# Patient Record
Sex: FEMALE | Race: WHITE | NOT HISPANIC OR LATINO | Employment: OTHER | URBAN - METROPOLITAN AREA
[De-identification: names, ages, dates, MRNs, and addresses within clinical notes are randomized per-mention and may not be internally consistent; named-entity substitution may affect disease eponyms.]

---

## 2018-06-15 ENCOUNTER — APPOINTMENT (OUTPATIENT)
Dept: WOMENS IMAGING | Facility: HOSPITAL | Age: 73
End: 2018-06-15

## 2018-06-15 PROCEDURE — 77067 SCR MAMMO BI INCL CAD: CPT | Performed by: RADIOLOGY

## 2018-08-23 ENCOUNTER — OFFICE VISIT (OUTPATIENT)
Dept: CARDIAC SURGERY | Facility: CLINIC | Age: 73
End: 2018-08-23

## 2018-08-23 VITALS
SYSTOLIC BLOOD PRESSURE: 120 MMHG | HEART RATE: 71 BPM | HEIGHT: 61 IN | OXYGEN SATURATION: 95 % | DIASTOLIC BLOOD PRESSURE: 64 MMHG | BODY MASS INDEX: 30.47 KG/M2 | WEIGHT: 161.4 LBS

## 2018-08-23 DIAGNOSIS — I73.9 PERIPHERAL ARTERIAL DISEASE (HCC): Primary | ICD-10-CM

## 2018-08-23 DIAGNOSIS — I73.9 PERIPHERAL VASCULAR DISEASE (HCC): Primary | ICD-10-CM

## 2018-08-23 PROCEDURE — 99204 OFFICE O/P NEW MOD 45 MIN: CPT | Performed by: THORACIC SURGERY (CARDIOTHORACIC VASCULAR SURGERY)

## 2018-08-23 RX ORDER — ASCORBIC ACID 500 MG
500 TABLET ORAL DAILY
COMMUNITY
End: 2022-04-07

## 2018-08-23 RX ORDER — IRBESARTAN 300 MG/1
300 TABLET ORAL NIGHTLY
COMMUNITY
End: 2021-01-29

## 2018-08-23 RX ORDER — CLOPIDOGREL BISULFATE 75 MG/1
75 TABLET ORAL DAILY
COMMUNITY
End: 2021-07-07 | Stop reason: SDUPTHER

## 2018-08-23 RX ORDER — ALBUTEROL SULFATE 2.5 MG/3ML
2.5 SOLUTION RESPIRATORY (INHALATION) EVERY 4 HOURS PRN
COMMUNITY

## 2018-08-23 RX ORDER — GABAPENTIN 300 MG/1
300 CAPSULE ORAL 3 TIMES DAILY
COMMUNITY
End: 2022-04-07 | Stop reason: SDUPTHER

## 2018-08-23 RX ORDER — LEVOTHYROXINE SODIUM 0.03 MG/1
12.5 TABLET ORAL 2 TIMES DAILY
COMMUNITY
End: 2018-09-03

## 2018-08-23 RX ORDER — CARVEDILOL 25 MG/1
25 TABLET ORAL 2 TIMES DAILY WITH MEALS
COMMUNITY
End: 2021-04-26 | Stop reason: SDUPTHER

## 2018-08-23 RX ORDER — ASPIRIN 81 MG/1
81 TABLET, CHEWABLE ORAL DAILY
COMMUNITY
End: 2021-01-29

## 2018-08-23 RX ORDER — CHOLECALCIFEROL (VITAMIN D3) 125 MCG
500 CAPSULE ORAL DAILY
COMMUNITY
End: 2021-01-29

## 2018-08-23 RX ORDER — SPIRONOLACTONE 100 MG/1
50 TABLET, FILM COATED ORAL NIGHTLY
COMMUNITY
Start: 2018-06-14 | End: 2021-01-29

## 2018-08-23 RX ORDER — ATORVASTATIN CALCIUM 80 MG/1
80 TABLET, FILM COATED ORAL DAILY
COMMUNITY
End: 2021-01-11

## 2018-08-23 RX ORDER — CHLORTHALIDONE 25 MG/1
25 TABLET ORAL DAILY
COMMUNITY
End: 2022-06-01 | Stop reason: SDUPTHER

## 2018-08-23 RX ORDER — FUROSEMIDE 20 MG/1
20 TABLET ORAL EVERY OTHER DAY
COMMUNITY
End: 2021-07-07 | Stop reason: SDUPTHER

## 2018-08-23 RX ORDER — ACETAMINOPHEN 500 MG
1000 TABLET ORAL EVERY 6 HOURS PRN
COMMUNITY
End: 2022-04-07

## 2018-08-23 RX ORDER — AMLODIPINE BESYLATE 10 MG/1
10 TABLET ORAL DAILY
COMMUNITY
Start: 2018-06-19 | End: 2021-04-26 | Stop reason: SDUPTHER

## 2018-08-23 RX ORDER — PANTOPRAZOLE SODIUM 40 MG/1
40 TABLET, DELAYED RELEASE ORAL 2 TIMES DAILY
COMMUNITY
End: 2022-03-21 | Stop reason: SDUPTHER

## 2018-08-23 NOTE — PROGRESS NOTES
08/23/2018  Patient Information  Vivi Son                                                                                          [address] [place] [address]   1945  'PCP/Referring Physician'  Provider, No Known  None  No ref. provider found    Chief Complaint   Patient presents with   • Consult     np for PAD per Dr. Vaughn        History of Present Illness:  This is a 73-year-old patient with a multitude of medical problems.  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  She tells me that she has maintained cancer-free at this time.  She has had peripheral arterial stents before, and a recent CT angiogram demonstrates critical left iliac stenosis with moderate right and left SFA stenosis.  It is estimated that the left iliac stenosis is greater than 90% and the distal aorta also has some degree of narrowing.  She has claudication in the left thigh, buttocks and calf in less than 50 feet.  She has some claudication in the right leg but the left leg is her most significant and it stops her ambulation first.  She does not have evidence of rest pain at this time.  Unfortunately, she is still continuing to smoke.      Patient Active Problem List   Diagnosis   • Peripheral vascular disease (CMS/HCC)   • Peripheral arterial disease (CMS/HCC)     Past Medical History:   Diagnosis Date   • Anxiety    • Arthritis    • Blood transfusion abn reaction or complication, no procedure mishap    • CAD (coronary artery disease)    • CHF (congestive heart failure) (CMS/HCC)    • Chronic kidney disease    • COPD (chronic obstructive pulmonary disease) (CMS/HCC)    • Depression    • Diabetes mellitus type II, controlled (CMS/HCC)    • GERD (gastroesophageal reflux disease)    • HTN (hypertension)    • Hyperlipidemia    • MI (myocardial infarction)    • Non-small cell carcinoma of right lung (CMS/HCC)    • PUD (peptic ulcer disease)    • PVD (peripheral vascular  disease) (CMS/HCC)    • Thyroid disease    • Tremors of nervous system      Past Surgical History:   Procedure Laterality Date   • APPENDECTOMY  1978   • CARPAL TUNNEL RELEASE Right    • CORONARY ANGIOPLASTY WITH STENT PLACEMENT      [facility] - unknown physician name    • LUMBAR SPINE SURGERY     • LUNG LOBECTOMY Right     RUL    • SACROCOCCYGEAL ULCER REMOVAL     • VOCAL CORD BIOPSY         Current Outpatient Prescriptions:   •  acetaminophen (TYLENOL) 500 MG tablet, Take 500 mg by mouth Every 6 (Six) Hours As Needed for Mild Pain ., Disp: , Rfl:   •  albuterol (PROVENTIL) (2.5 MG/3ML) 0.083% nebulizer solution, Take 2.5 mg by nebulization Every 4 (Four) Hours As Needed for Wheezing., Disp: , Rfl:   •  amLODIPine (NORVASC) 5 MG tablet, , Disp: , Rfl:   •  aspirin 81 MG chewable tablet, Chew 81 mg Daily., Disp: , Rfl:   •  atorvastatin (LIPITOR) 80 MG tablet, Take 80 mg by mouth Daily., Disp: , Rfl:   •  carbonyl iron (FEOSOL) 45 MG tablet tablet, Take  by mouth Daily., Disp: , Rfl:   •  carvedilol (COREG) 25 MG tablet, Take 25 mg by mouth 2 (Two) Times a Day With Meals., Disp: , Rfl:   •  chlorthalidone (HYGROTON) 25 MG tablet, Take 25 mg by mouth Daily., Disp: , Rfl:   •  Cholecalciferol (VITAMIN D3) 5000 units capsule capsule, Take 5,000 Units by mouth Daily., Disp: , Rfl:   •  clopidogrel (PLAVIX) 75 MG tablet, Take 75 mg by mouth Daily., Disp: , Rfl:   •  furosemide (LASIX) 20 MG tablet, Take 20 mg by mouth 2 (Two) Times a Day., Disp: , Rfl:   •  gabapentin (NEURONTIN) 300 MG capsule, Take 300 mg by mouth 3 (Three) Times a Day., Disp: , Rfl:   •  Insulin Degludec (TRESIBA FLEXTOUCH SC), Inject  under the skin into the appropriate area as directed., Disp: , Rfl:   •  insulin NPH-insulin regular (humuLIN 70/30,novoLIN 70/30) (70-30) 100 UNIT/ML injection, Inject  under the skin into the appropriate area as directed 2 (Two) Times a Day With Meals., Disp: , Rfl:   •  irbesartan (AVAPRO) 300 MG tablet,  Take 300 mg by mouth Every Night., Disp: , Rfl:   •  levothyroxine (SYNTHROID, LEVOTHROID) 12.5 MCG half tablet, Take 12.5 mcg by mouth 2 (Two) Times a Day., Disp: , Rfl:   •  pantoprazole (PROTONIX) 40 MG EC tablet, Take 40 mg by mouth Daily., Disp: , Rfl:   •  sodium-potassium bicarbonate (SHELLEY-SELTZER GOLD) effervescent tablet, Take 2 tablets by mouth Every 4 (Four) Hours As Needed., Disp: , Rfl:   •  spironolactone (ALDACTONE) 100 MG tablet, , Disp: , Rfl:   •  SUPER B COMPLEX/C capsule, Take  by mouth., Disp: , Rfl:   •  tiotropium (SPIRIVA) 18 MCG per inhalation capsule, Place 1 capsule into inhaler and inhale Daily., Disp: , Rfl:   •  vitamin B-12 (CYANOCOBALAMIN) 500 MCG tablet, Take 500 mcg by mouth Daily., Disp: , Rfl:   •  vitamin C (ASCORBIC ACID) 500 MG tablet, Take 500 mg by mouth Daily., Disp: , Rfl:   Allergies   Allergen Reactions   • Lortab [Hydrocodone-Acetaminophen] Hallucinations   • Percocet [Oxycodone-Acetaminophen] Hallucinations     Social History     Social History   • Marital status:      Spouse name: N/A   • Number of children: 2   • Years of education: N/A     Occupational History   • Retired       [employer]      Social History Main Topics   • Smoking status: Current Every Day Smoker     Packs/day: 1.50     Years: 60.00     Types: Cigarettes   • Smokeless tobacco: Never Used   • Alcohol use No   • Drug use: No   • Sexual activity: Not on file     Other Topics Concern   • Not on file     Social History Narrative    Lives alone in [place].     Family History   Problem Relation Age of Onset   • Diabetes insipidus Mother    • Diabetes Mother    • Kidney failure Mother    • Dementia Father      Review of Systems   Constitution: Positive for malaise/fatigue. Negative for chills, fever, night sweats and weight loss.   HENT: Positive for hearing loss. Negative for odynophagia and sore throat.    Cardiovascular: Positive for dyspnea on exertion and leg swelling (bi lat ). Negative for  "chest pain, orthopnea and palpitations.   Respiratory: Positive for cough. Negative for hemoptysis.    Endocrine: Positive for cold intolerance and heat intolerance. Negative for polydipsia, polyphagia and polyuria.   Hematologic/Lymphatic: Bruises/bleeds easily.   Skin: Negative for itching and rash.   Musculoskeletal: Positive for back pain, muscle cramps (left leg ) and neck pain. Negative for joint pain, joint swelling and myalgias.   Gastrointestinal: Positive for constipation, diarrhea and melena (iron med). Negative for abdominal pain, hematemesis, hematochezia, nausea and vomiting.   Genitourinary: Positive for nocturia and urgency. Negative for dysuria, frequency and hematuria.   Neurological: Positive for dizziness, loss of balance and sensory change (tingling ). Negative for focal weakness, headaches, light-headedness, numbness and seizures.   Psychiatric/Behavioral: Positive for depression. Negative for suicidal ideas. The patient is nervous/anxious.    All other systems reviewed and are negative.    Vitals:    08/23/18 0903   BP: 120/64   BP Location: Right arm   Patient Position: Sitting   Pulse: 71   SpO2: 95%   Weight: 73.2 kg (161 lb 6.4 oz)   Height: 154.9 cm (61\")      Physical Exam  CONSTITUTIONAL:  Alert and conversant, in no acute distress.  EYES:    Eyes anicteric.  EOMI.  PERRLA.  ENT:    No nasal deviation.  Trachea is midline.  NECK:    No masses or JVD.  LUNGS:   Decreased in the bases; no wheezing, rales or rhonchi.  CARDIAC:        Regular rate and rhythm without murmur or rub.  No carotid bruits.  GI:      The abdomen is soft, nontender, nondistended with normal bowel sounds.  No hepatosplenomegaly and no masses.  EXTREMITIES:    No cyanosis, clubbing or edema.  SKIN:    No ulcers.  NEURO:   No focal motor or sensory deficits.  PSYCH:   Alert and oriented x3; mood and affect good.  VASCULAR:   The femoral pulses are weak bilaterally.  The feet are hyperemic.  The toenails are thickened. "  The feet are viable.  I am unable to palpate       posterior tibial or dorsalis pedis pulses in either leg.  Labs/Imaging:  I have discussed this case with her cardiologist, Dr. Vaughn, and I have reviewed the CT angiogram demonstrating greater than 90% left iliac stenosis.    Assessment/Plan:  Significant peripheral vascular disease.  The patient is now in remission following thoracotomy last year for non-small cell lung cancer.  She has significant left leg claudication and greater than 90% left iliac stenosis.  We will plan aortogram with runoff for evaluation.  We will probably proceed with a left femoral stick.  She is aware of nephrotoxicity, contrast reaction and limb loss.  No guarantees have been made as to outcome.  She agrees to proceed.    Patient Active Problem List   Diagnosis   • Peripheral vascular disease (CMS/HCC)   • Peripheral arterial disease (CMS/HCC)     CC: MD Ena Parks transcribing on behalf of Triston Floyd MD

## 2018-08-24 ENCOUNTER — PREP FOR SURGERY (OUTPATIENT)
Dept: OTHER | Facility: HOSPITAL | Age: 73
End: 2018-08-24

## 2018-08-24 DIAGNOSIS — I73.9 PAD (PERIPHERAL ARTERY DISEASE) (HCC): Primary | ICD-10-CM

## 2018-09-03 ENCOUNTER — ANESTHESIA EVENT (OUTPATIENT)
Dept: PERIOP | Facility: HOSPITAL | Age: 73
End: 2018-09-03

## 2018-09-03 ENCOUNTER — APPOINTMENT (OUTPATIENT)
Dept: PREADMISSION TESTING | Facility: HOSPITAL | Age: 73
End: 2018-09-03

## 2018-09-03 VITALS — WEIGHT: 163.36 LBS | HEIGHT: 61 IN | BODY MASS INDEX: 30.84 KG/M2

## 2018-09-03 DIAGNOSIS — I73.9 PAD (PERIPHERAL ARTERY DISEASE) (HCC): ICD-10-CM

## 2018-09-03 LAB
ANION GAP SERPL CALCULATED.3IONS-SCNC: 4 MMOL/L (ref 3–11)
APTT PPP: 25.4 SECONDS (ref 24–31)
BUN BLD-MCNC: 40 MG/DL (ref 9–23)
BUN/CREAT SERPL: 23.8 (ref 7–25)
CALCIUM SPEC-SCNC: 8.7 MG/DL (ref 8.7–10.4)
CHLORIDE SERPL-SCNC: 114 MMOL/L (ref 99–109)
CO2 SERPL-SCNC: 22 MMOL/L (ref 20–31)
CREAT BLD-MCNC: 1.68 MG/DL (ref 0.6–1.3)
DEPRECATED RDW RBC AUTO: 48.8 FL (ref 37–54)
ERYTHROCYTE [DISTWIDTH] IN BLOOD BY AUTOMATED COUNT: 13.2 % (ref 11.3–14.5)
GFR SERPL CREATININE-BSD FRML MDRD: 30 ML/MIN/1.73
GLUCOSE BLD-MCNC: 110 MG/DL (ref 70–100)
HBA1C MFR BLD: 8.8 % (ref 4.8–5.6)
HCT VFR BLD AUTO: 35.1 % (ref 34.5–44)
HGB BLD-MCNC: 11.4 G/DL (ref 11.5–15.5)
INR PPP: 0.96 (ref 0.91–1.09)
MCH RBC QN AUTO: 33.1 PG (ref 27–31)
MCHC RBC AUTO-ENTMCNC: 32.5 G/DL (ref 32–36)
MCV RBC AUTO: 102 FL (ref 80–99)
PLATELET # BLD AUTO: 220 10*3/MM3 (ref 150–450)
PMV BLD AUTO: 9.2 FL (ref 6–12)
POTASSIUM BLD-SCNC: 5.5 MMOL/L (ref 3.5–5.5)
PROTHROMBIN TIME: 10.1 SECONDS (ref 9.6–11.5)
RBC # BLD AUTO: 3.44 10*6/MM3 (ref 3.89–5.14)
SODIUM BLD-SCNC: 140 MMOL/L (ref 132–146)
WBC NRBC COR # BLD: 12.2 10*3/MM3 (ref 3.5–10.8)

## 2018-09-03 PROCEDURE — 80048 BASIC METABOLIC PNL TOTAL CA: CPT | Performed by: PHYSICIAN ASSISTANT

## 2018-09-03 PROCEDURE — 85730 THROMBOPLASTIN TIME PARTIAL: CPT | Performed by: PHYSICIAN ASSISTANT

## 2018-09-03 PROCEDURE — 93010 ELECTROCARDIOGRAM REPORT: CPT | Performed by: INTERNAL MEDICINE

## 2018-09-03 PROCEDURE — 36415 COLL VENOUS BLD VENIPUNCTURE: CPT

## 2018-09-03 PROCEDURE — 83036 HEMOGLOBIN GLYCOSYLATED A1C: CPT | Performed by: PHYSICIAN ASSISTANT

## 2018-09-03 PROCEDURE — 85610 PROTHROMBIN TIME: CPT | Performed by: PHYSICIAN ASSISTANT

## 2018-09-03 PROCEDURE — 93005 ELECTROCARDIOGRAM TRACING: CPT

## 2018-09-03 PROCEDURE — 85027 COMPLETE CBC AUTOMATED: CPT | Performed by: PHYSICIAN ASSISTANT

## 2018-09-03 RX ORDER — SODIUM BICARBONATE 650 MG/1
650 TABLET ORAL 2 TIMES DAILY
COMMUNITY
End: 2021-01-29

## 2018-09-03 RX ORDER — PNV NO.95/FERROUS FUM/FOLIC AC 28MG-0.8MG
325 TABLET ORAL 2 TIMES DAILY
COMMUNITY

## 2018-09-03 RX ORDER — ALBUTEROL SULFATE 90 UG/1
2 AEROSOL, METERED RESPIRATORY (INHALATION) EVERY 4 HOURS PRN
COMMUNITY
End: 2022-04-07

## 2018-09-03 RX ORDER — LEVOTHYROXINE SODIUM 0.03 MG/1
25 TABLET ORAL DAILY
COMMUNITY
End: 2022-06-21

## 2018-09-03 NOTE — DISCHARGE INSTRUCTIONS

## 2018-09-04 ENCOUNTER — APPOINTMENT (OUTPATIENT)
Dept: GENERAL RADIOLOGY | Facility: HOSPITAL | Age: 73
End: 2018-09-04

## 2018-09-04 ENCOUNTER — ANESTHESIA (OUTPATIENT)
Dept: PERIOP | Facility: HOSPITAL | Age: 73
End: 2018-09-04

## 2018-09-04 ENCOUNTER — HOSPITAL ENCOUNTER (OUTPATIENT)
Facility: HOSPITAL | Age: 73
Discharge: HOME | End: 2018-09-05
Attending: THORACIC SURGERY (CARDIOTHORACIC VASCULAR SURGERY) | Admitting: THORACIC SURGERY (CARDIOTHORACIC VASCULAR SURGERY)

## 2018-09-04 DIAGNOSIS — I73.9 PAD (PERIPHERAL ARTERY DISEASE) (HCC): ICD-10-CM

## 2018-09-04 LAB
ACT BLD: 147 SECONDS (ref 82–152)
GLUCOSE BLDC GLUCOMTR-MCNC: 176 MG/DL (ref 70–130)
GLUCOSE BLDC GLUCOMTR-MCNC: 203 MG/DL (ref 70–130)
GLUCOSE BLDC GLUCOMTR-MCNC: 89 MG/DL (ref 70–130)

## 2018-09-04 PROCEDURE — S0260 H&P FOR SURGERY: HCPCS | Performed by: THORACIC SURGERY (CARDIOTHORACIC VASCULAR SURGERY)

## 2018-09-04 PROCEDURE — A9270 NON-COVERED ITEM OR SERVICE: HCPCS | Performed by: THORACIC SURGERY (CARDIOTHORACIC VASCULAR SURGERY)

## 2018-09-04 PROCEDURE — 25010000002 PROPOFOL 1000 MG/ML EMULSION: Performed by: NURSE ANESTHETIST, CERTIFIED REGISTERED

## 2018-09-04 PROCEDURE — G0378 HOSPITAL OBSERVATION PER HR: HCPCS

## 2018-09-04 PROCEDURE — 37221 PR REVSC OPN/PRQ ILIAC ART W/STNT PLMT & ANGIOPLSTY: CPT | Performed by: THORACIC SURGERY (CARDIOTHORACIC VASCULAR SURGERY)

## 2018-09-04 PROCEDURE — A9270 NON-COVERED ITEM OR SERVICE: HCPCS | Performed by: PHYSICIAN ASSISTANT

## 2018-09-04 PROCEDURE — C1894 INTRO/SHEATH, NON-LASER: HCPCS | Performed by: THORACIC SURGERY (CARDIOTHORACIC VASCULAR SURGERY)

## 2018-09-04 PROCEDURE — 37236 OPEN/PERQ PLACE STENT 1ST: CPT | Performed by: THORACIC SURGERY (CARDIOTHORACIC VASCULAR SURGERY)

## 2018-09-04 PROCEDURE — 94640 AIRWAY INHALATION TREATMENT: CPT

## 2018-09-04 PROCEDURE — 63710000001 ACETAMINOPHEN 500 MG TABLET: Performed by: PHYSICIAN ASSISTANT

## 2018-09-04 PROCEDURE — 63710000001 SODIUM BICARBONATE 650 MG TABLET: Performed by: PHYSICIAN ASSISTANT

## 2018-09-04 PROCEDURE — 63710000001 ATORVASTATIN 40 MG TABLET: Performed by: PHYSICIAN ASSISTANT

## 2018-09-04 PROCEDURE — C1725 CATH, TRANSLUMIN NON-LASER: HCPCS | Performed by: THORACIC SURGERY (CARDIOTHORACIC VASCULAR SURGERY)

## 2018-09-04 PROCEDURE — C1874 STENT, COATED/COV W/DEL SYS: HCPCS | Performed by: THORACIC SURGERY (CARDIOTHORACIC VASCULAR SURGERY)

## 2018-09-04 PROCEDURE — 63710000001 GABAPENTIN 300 MG CAPSULE: Performed by: THORACIC SURGERY (CARDIOTHORACIC VASCULAR SURGERY)

## 2018-09-04 PROCEDURE — C1769 GUIDE WIRE: HCPCS | Performed by: THORACIC SURGERY (CARDIOTHORACIC VASCULAR SURGERY)

## 2018-09-04 PROCEDURE — 0 IODIXANOL PER 1 ML: Performed by: THORACIC SURGERY (CARDIOTHORACIC VASCULAR SURGERY)

## 2018-09-04 PROCEDURE — 63710000001 INSULIN LISPRO (HUMAN) PER 5 UNITS: Performed by: PHYSICIAN ASSISTANT

## 2018-09-04 PROCEDURE — 25010000002 FENTANYL CITRATE (PF) 100 MCG/2ML SOLUTION: Performed by: NURSE ANESTHETIST, CERTIFIED REGISTERED

## 2018-09-04 PROCEDURE — 63710000001 CARVEDILOL 12.5 MG TABLET: Performed by: PHYSICIAN ASSISTANT

## 2018-09-04 PROCEDURE — C1887 CATHETER, GUIDING: HCPCS | Performed by: THORACIC SURGERY (CARDIOTHORACIC VASCULAR SURGERY)

## 2018-09-04 PROCEDURE — 94799 UNLISTED PULMONARY SVC/PX: CPT

## 2018-09-04 PROCEDURE — 85347 COAGULATION TIME ACTIVATED: CPT

## 2018-09-04 PROCEDURE — 63710000001 LOSARTAN 50 MG TABLET: Performed by: PHYSICIAN ASSISTANT

## 2018-09-04 PROCEDURE — 75630 X-RAY AORTA LEG ARTERIES: CPT | Performed by: THORACIC SURGERY (CARDIOTHORACIC VASCULAR SURGERY)

## 2018-09-04 PROCEDURE — 82962 GLUCOSE BLOOD TEST: CPT

## 2018-09-04 PROCEDURE — 63710000001 SPIRONOLACTONE 25 MG TABLET: Performed by: PHYSICIAN ASSISTANT

## 2018-09-04 PROCEDURE — 75625 CONTRAST EXAM ABDOMINL AORTA: CPT

## 2018-09-04 PROCEDURE — 25010000002 HEPARIN (PORCINE) PER 1000 UNITS: Performed by: THORACIC SURGERY (CARDIOTHORACIC VASCULAR SURGERY)

## 2018-09-04 PROCEDURE — 63710000001 AMLODIPINE 5 MG TABLET: Performed by: PHYSICIAN ASSISTANT

## 2018-09-04 DEVICE — VIABAHN BX BALLOON EXP ENDO 8MMX39MM 7FR 80CMCATH HEPARIN
Type: IMPLANTABLE DEVICE | Status: FUNCTIONAL
Brand: GORE VIABAHN VBX BALLOON EXPANDABLE ENDO

## 2018-09-04 RX ORDER — CLOPIDOGREL BISULFATE 75 MG/1
75 TABLET ORAL DAILY
Status: DISCONTINUED | OUTPATIENT
Start: 2018-09-05 | End: 2018-09-05 | Stop reason: HOSPADM

## 2018-09-04 RX ORDER — LOSARTAN POTASSIUM 50 MG/1
100 TABLET ORAL NIGHTLY
Status: DISCONTINUED | OUTPATIENT
Start: 2018-09-04 | End: 2018-09-05 | Stop reason: HOSPADM

## 2018-09-04 RX ORDER — LANOLIN ALCOHOL/MO/W.PET/CERES
500 CREAM (GRAM) TOPICAL DAILY
Status: DISCONTINUED | OUTPATIENT
Start: 2018-09-05 | End: 2018-09-05 | Stop reason: HOSPADM

## 2018-09-04 RX ORDER — FUROSEMIDE 40 MG/1
20 TABLET ORAL EVERY OTHER DAY
Status: DISCONTINUED | OUTPATIENT
Start: 2018-09-05 | End: 2018-09-05 | Stop reason: HOSPADM

## 2018-09-04 RX ORDER — CHLORTHALIDONE 25 MG/1
25 TABLET ORAL DAILY
Status: DISCONTINUED | OUTPATIENT
Start: 2018-09-05 | End: 2018-09-05 | Stop reason: HOSPADM

## 2018-09-04 RX ORDER — NALOXONE HCL 0.4 MG/ML
0.4 VIAL (ML) INJECTION AS NEEDED
Status: DISCONTINUED | OUTPATIENT
Start: 2018-09-04 | End: 2018-09-04 | Stop reason: HOSPADM

## 2018-09-04 RX ORDER — SODIUM CHLORIDE 450 MG/100ML
100 INJECTION, SOLUTION INTRAVENOUS CONTINUOUS
Status: DISCONTINUED | OUTPATIENT
Start: 2018-09-04 | End: 2018-09-05 | Stop reason: HOSPADM

## 2018-09-04 RX ORDER — LIDOCAINE HYDROCHLORIDE 10 MG/ML
INJECTION, SOLUTION EPIDURAL; INFILTRATION; INTRACAUDAL; PERINEURAL AS NEEDED
Status: DISCONTINUED | OUTPATIENT
Start: 2018-09-04 | End: 2018-09-04 | Stop reason: SURG

## 2018-09-04 RX ORDER — SODIUM CHLORIDE 0.9 % (FLUSH) 0.9 %
1-10 SYRINGE (ML) INJECTION AS NEEDED
Status: DISCONTINUED | OUTPATIENT
Start: 2018-09-04 | End: 2018-09-04 | Stop reason: HOSPADM

## 2018-09-04 RX ORDER — FERROUS SULFATE 325(65) MG
325 TABLET ORAL 2 TIMES DAILY WITH MEALS
Status: DISCONTINUED | OUTPATIENT
Start: 2018-09-05 | End: 2018-09-05 | Stop reason: HOSPADM

## 2018-09-04 RX ORDER — HYDROCODONE BITARTRATE AND ACETAMINOPHEN 5; 325 MG/1; MG/1
1 TABLET ORAL EVERY 4 HOURS PRN
Status: DISCONTINUED | OUTPATIENT
Start: 2018-09-04 | End: 2018-09-05

## 2018-09-04 RX ORDER — ONDANSETRON 2 MG/ML
4 INJECTION INTRAMUSCULAR; INTRAVENOUS ONCE AS NEEDED
Status: DISCONTINUED | OUTPATIENT
Start: 2018-09-04 | End: 2018-09-04 | Stop reason: HOSPADM

## 2018-09-04 RX ORDER — ALBUTEROL SULFATE 2.5 MG/3ML
2.5 SOLUTION RESPIRATORY (INHALATION) EVERY 4 HOURS PRN
Status: DISCONTINUED | OUTPATIENT
Start: 2018-09-04 | End: 2018-09-04 | Stop reason: SDUPTHER

## 2018-09-04 RX ORDER — NICOTINE POLACRILEX 4 MG
15 LOZENGE BUCCAL
Status: DISCONTINUED | OUTPATIENT
Start: 2018-09-04 | End: 2018-09-05 | Stop reason: HOSPADM

## 2018-09-04 RX ORDER — GABAPENTIN 300 MG/1
300 CAPSULE ORAL EVERY 8 HOURS SCHEDULED
Status: DISCONTINUED | OUTPATIENT
Start: 2018-09-04 | End: 2018-09-05 | Stop reason: HOSPADM

## 2018-09-04 RX ORDER — ASCORBIC ACID 500 MG
500 TABLET ORAL DAILY
Status: DISCONTINUED | OUTPATIENT
Start: 2018-09-05 | End: 2018-09-05 | Stop reason: HOSPADM

## 2018-09-04 RX ORDER — LIDOCAINE HYDROCHLORIDE 10 MG/ML
0.5 INJECTION, SOLUTION EPIDURAL; INFILTRATION; INTRACAUDAL; PERINEURAL ONCE AS NEEDED
Status: COMPLETED | OUTPATIENT
Start: 2018-09-04 | End: 2018-09-04

## 2018-09-04 RX ORDER — FENTANYL CITRATE 50 UG/ML
50 INJECTION, SOLUTION INTRAMUSCULAR; INTRAVENOUS
Status: DISCONTINUED | OUTPATIENT
Start: 2018-09-04 | End: 2018-09-04 | Stop reason: HOSPADM

## 2018-09-04 RX ORDER — SODIUM CHLORIDE, SODIUM LACTATE, POTASSIUM CHLORIDE, CALCIUM CHLORIDE 600; 310; 30; 20 MG/100ML; MG/100ML; MG/100ML; MG/100ML
9 INJECTION, SOLUTION INTRAVENOUS CONTINUOUS
Status: DISCONTINUED | OUTPATIENT
Start: 2018-09-04 | End: 2018-09-04 | Stop reason: SDUPTHER

## 2018-09-04 RX ORDER — LABETALOL HYDROCHLORIDE 5 MG/ML
5 INJECTION, SOLUTION INTRAVENOUS
Status: DISCONTINUED | OUTPATIENT
Start: 2018-09-04 | End: 2018-09-04 | Stop reason: HOSPADM

## 2018-09-04 RX ORDER — ATORVASTATIN CALCIUM 40 MG/1
80 TABLET, FILM COATED ORAL DAILY
Status: DISCONTINUED | OUTPATIENT
Start: 2018-09-04 | End: 2018-09-05 | Stop reason: HOSPADM

## 2018-09-04 RX ORDER — SODIUM BICARBONATE 650 MG/1
650 TABLET ORAL 2 TIMES DAILY
Status: DISCONTINUED | OUTPATIENT
Start: 2018-09-04 | End: 2018-09-05 | Stop reason: HOSPADM

## 2018-09-04 RX ORDER — PANTOPRAZOLE SODIUM 40 MG/1
40 TABLET, DELAYED RELEASE ORAL 2 TIMES DAILY
Status: DISCONTINUED | OUTPATIENT
Start: 2018-09-04 | End: 2018-09-05 | Stop reason: HOSPADM

## 2018-09-04 RX ORDER — IODIXANOL 320 MG/ML
INJECTION, SOLUTION INTRAVASCULAR AS NEEDED
Status: DISCONTINUED | OUTPATIENT
Start: 2018-09-04 | End: 2018-09-04 | Stop reason: HOSPADM

## 2018-09-04 RX ORDER — DEXTROSE MONOHYDRATE 25 G/50ML
25 INJECTION, SOLUTION INTRAVENOUS
Status: DISCONTINUED | OUTPATIENT
Start: 2018-09-04 | End: 2018-09-05 | Stop reason: HOSPADM

## 2018-09-04 RX ORDER — LEVOTHYROXINE SODIUM 0.03 MG/1
25 TABLET ORAL
Status: DISCONTINUED | OUTPATIENT
Start: 2018-09-05 | End: 2018-09-05 | Stop reason: HOSPADM

## 2018-09-04 RX ORDER — ASPIRIN 81 MG/1
81 TABLET, CHEWABLE ORAL DAILY
Status: DISCONTINUED | OUTPATIENT
Start: 2018-09-05 | End: 2018-09-05 | Stop reason: HOSPADM

## 2018-09-04 RX ORDER — LIDOCAINE HYDROCHLORIDE 10 MG/ML
INJECTION, SOLUTION INFILTRATION; PERINEURAL AS NEEDED
Status: DISCONTINUED | OUTPATIENT
Start: 2018-09-04 | End: 2018-09-04 | Stop reason: HOSPADM

## 2018-09-04 RX ORDER — EPHEDRINE SULFATE 50 MG/ML
5 INJECTION, SOLUTION INTRAVENOUS ONCE AS NEEDED
Status: DISCONTINUED | OUTPATIENT
Start: 2018-09-04 | End: 2018-09-04 | Stop reason: HOSPADM

## 2018-09-04 RX ORDER — SPIRONOLACTONE 25 MG/1
100 TABLET ORAL DAILY
Status: DISCONTINUED | OUTPATIENT
Start: 2018-09-04 | End: 2018-09-05 | Stop reason: HOSPADM

## 2018-09-04 RX ORDER — FAMOTIDINE 10 MG/ML
20 INJECTION, SOLUTION INTRAVENOUS ONCE
Status: DISCONTINUED | OUTPATIENT
Start: 2018-09-04 | End: 2018-09-04

## 2018-09-04 RX ORDER — SODIUM CHLORIDE 9 MG/ML
50 INJECTION, SOLUTION INTRAVENOUS CONTINUOUS
Status: DISCONTINUED | OUTPATIENT
Start: 2018-09-04 | End: 2018-09-04 | Stop reason: SDUPTHER

## 2018-09-04 RX ORDER — FAMOTIDINE 20 MG/1
20 TABLET, FILM COATED ORAL ONCE
Status: COMPLETED | OUTPATIENT
Start: 2018-09-04 | End: 2018-09-04

## 2018-09-04 RX ORDER — MEPERIDINE HYDROCHLORIDE 25 MG/ML
12.5 INJECTION INTRAMUSCULAR; INTRAVENOUS; SUBCUTANEOUS
Status: DISCONTINUED | OUTPATIENT
Start: 2018-09-04 | End: 2018-09-04 | Stop reason: HOSPADM

## 2018-09-04 RX ORDER — CARVEDILOL 12.5 MG/1
25 TABLET ORAL 2 TIMES DAILY WITH MEALS
Status: DISCONTINUED | OUTPATIENT
Start: 2018-09-04 | End: 2018-09-05 | Stop reason: HOSPADM

## 2018-09-04 RX ORDER — ALBUTEROL SULFATE 2.5 MG/3ML
2.5 SOLUTION RESPIRATORY (INHALATION) EVERY 4 HOURS PRN
Status: DISCONTINUED | OUTPATIENT
Start: 2018-09-04 | End: 2018-09-05 | Stop reason: HOSPADM

## 2018-09-04 RX ORDER — ACETAMINOPHEN 500 MG
500 TABLET ORAL EVERY 6 HOURS PRN
Status: DISCONTINUED | OUTPATIENT
Start: 2018-09-04 | End: 2018-09-05 | Stop reason: HOSPADM

## 2018-09-04 RX ORDER — AMLODIPINE BESYLATE 5 MG/1
5 TABLET ORAL DAILY
Status: DISCONTINUED | OUTPATIENT
Start: 2018-09-04 | End: 2018-09-05 | Stop reason: HOSPADM

## 2018-09-04 RX ORDER — SODIUM CHLORIDE, SODIUM LACTATE, POTASSIUM CHLORIDE, CALCIUM CHLORIDE 600; 310; 30; 20 MG/100ML; MG/100ML; MG/100ML; MG/100ML
100 INJECTION, SOLUTION INTRAVENOUS CONTINUOUS
Status: DISCONTINUED | OUTPATIENT
Start: 2018-09-04 | End: 2018-09-05 | Stop reason: HOSPADM

## 2018-09-04 RX ADMIN — GABAPENTIN 300 MG: 300 CAPSULE ORAL at 20:39

## 2018-09-04 RX ADMIN — FAMOTIDINE 20 MG: 20 TABLET ORAL at 09:40

## 2018-09-04 RX ADMIN — ACETAMINOPHEN 500 MG: 500 TABLET, FILM COATED ORAL at 20:36

## 2018-09-04 RX ADMIN — CARVEDILOL 25 MG: 12.5 TABLET, FILM COATED ORAL at 20:39

## 2018-09-04 RX ADMIN — ATORVASTATIN CALCIUM 80 MG: 40 TABLET, FILM COATED ORAL at 20:36

## 2018-09-04 RX ADMIN — LIDOCAINE HYDROCHLORIDE 0.5 ML: 10 INJECTION, SOLUTION EPIDURAL; INFILTRATION; INTRACAUDAL; PERINEURAL at 09:45

## 2018-09-04 RX ADMIN — LOSARTAN POTASSIUM 100 MG: 50 TABLET ORAL at 20:39

## 2018-09-04 RX ADMIN — SODIUM BICARBONATE 650 MG TABLET 650 MG: at 20:36

## 2018-09-04 RX ADMIN — FENTANYL CITRATE 50 MCG: 50 INJECTION INTRAMUSCULAR; INTRAVENOUS at 14:50

## 2018-09-04 RX ADMIN — SODIUM CHLORIDE 100 ML/HR: 4.5 INJECTION, SOLUTION INTRAVENOUS at 20:36

## 2018-09-04 RX ADMIN — SPIRONOLACTONE 50 MG: 25 TABLET ORAL at 20:36

## 2018-09-04 RX ADMIN — FENTANYL CITRATE 50 MCG: 50 INJECTION INTRAMUSCULAR; INTRAVENOUS at 13:47

## 2018-09-04 RX ADMIN — PROPOFOL 200 MCG/KG/MIN: 10 INJECTION, EMULSION INTRAVENOUS at 12:40

## 2018-09-04 RX ADMIN — FENTANYL CITRATE 50 MCG: 50 INJECTION INTRAMUSCULAR; INTRAVENOUS at 17:41

## 2018-09-04 RX ADMIN — SODIUM CHLORIDE 50 ML/HR: 9 INJECTION, SOLUTION INTRAVENOUS at 09:37

## 2018-09-04 RX ADMIN — SODIUM CHLORIDE, POTASSIUM CHLORIDE, SODIUM LACTATE AND CALCIUM CHLORIDE: 600; 310; 30; 20 INJECTION, SOLUTION INTRAVENOUS at 12:25

## 2018-09-04 RX ADMIN — LIDOCAINE HYDROCHLORIDE 40 MG: 10 INJECTION, SOLUTION EPIDURAL; INFILTRATION; INTRACAUDAL; PERINEURAL at 12:40

## 2018-09-04 RX ADMIN — AMLODIPINE BESYLATE 5 MG: 5 TABLET ORAL at 20:39

## 2018-09-04 RX ADMIN — IPRATROPIUM BROMIDE 0.5 MG: 0.5 SOLUTION RESPIRATORY (INHALATION) at 21:07

## 2018-09-04 NOTE — ANESTHESIA PREPROCEDURE EVALUATION
Anesthesia Evaluation                  Airway   Mallampati: II  Dental      Pulmonary    (+) COPD,   Cardiovascular     (+) hypertension, past MI , CHF,       Neuro/Psych  GI/Hepatic/Renal/Endo    (+)  GI bleeding,     Musculoskeletal     Abdominal    Substance History      OB/GYN          Other   (+) arthritis   history of cancer                    Anesthesia Plan    ASA 3     general     intravenous induction   Anesthetic plan, all risks, benefits, and alternatives have been provided, discussed and informed consent has been obtained with: patient.

## 2018-09-04 NOTE — ANESTHESIA POSTPROCEDURE EVALUATION
"Patient: Vivi Son    Procedure Summary     Date:  09/04/18 Room / Location:   [facility] 15 /  [facility]    Anesthesia Start:  1233 Anesthesia Stop:      Procedure:  AORTAGRAM WITH RUNOFFS RIGHT COMMON ILLIAC AND DISTAL ABDOMINAL AORTA STENT (N/A Abdomen) Diagnosis:       Peripheral vascular disease (CMS/HCC)      (Peripheral vascular disease (CMS/HCC) [I73.9])    Surgeon:  Triston Floyd MD Provider:  Domingo Ward MD    Anesthesia Type:  general ASA Status:  3          Anesthesia Type: general  Last vitals  BP   1255/78   Temp   98   Pulse   72   Resp   16     SpO2   97     Post Anesthesia Care and Evaluation    Patient location during evaluation: PACU  Patient participation: complete - patient participated  Level of consciousness: awake and responsive to verbal stimuli  Pain score: 2  Pain management: adequate  Airway patency: patent  Anesthetic complications: No anesthetic complications    Cardiovascular status: acceptable  Respiratory status: acceptable  Hydration status: acceptable    Comments: Pt awake and responsive. SV. VSS. Report to RN. Patient Vitals in the past 24 hrs:  09/04/18 0932, BP:161/79, Temp:97.1 °F (36.2 °C), Temp src:Tympanic, Pulse:70, Resp:16, SpO2:96 %, Height:154.9 cm (61\"), Weight:73.9 kg (163 lb)  133/78. p 72. r 16. t 98.1                  "

## 2018-09-04 NOTE — OP NOTE
Operative Report    Preop Diagnosis: Peripheral arterial vascular disease.  COPD.  Tobacco abuse.  Abnormal CT angiogram            Procedure: Catheter in the right femoral artery.  Catheter in the left femoral artery.  Aortogram.  Angioplasty and stent the right common iliac artery with an 8 x 39 VBX VBX covered stent.  Angioplasty and stent of the distal infrarenal aorta with an 8 x 39 VBX is covered stent ballooned to 10 mm the separate balloon.        Surgeons: suzette Floyd M.D. and Celeste Hutchison PAC        Indication: Patient referred with peripheral arterial disease severe left thigh buttocks and leg claudication and a CT angiogram demonstrating bilateral iliac disease she understood the surgery had with the risk of stroke bleeding infection contrast reaction nephrotoxicity and agreed to proceed        Description: Supine position sterile prep and drape left femoral artery percutaneous cannulated.  Hand-held injection demonstrated an occluded common iliac artery which I was unable to cross with multiple wires and guide catheters.  I then percutaneously cannulated the right common femoral artery was able to pass catheter to the narrowing in the distal aorta and right common iliac into the suprarenal abdominal aorta.  Aortogram demonstrated the renal arteries are patent distally a tapered left common iliac was occluded and there was a 90% narrowing at the ostium of the right common iliac.  I then switched to a 7 Cymro sheath on the right and corrected the right common iliac stenosis with an 8 x 39 covered stent graft.  I then addressed the distal aorta and used a  separate 10 mm balloon to then stent the distal abdominal aorta dilated to 10 mm with the stent which was originally a size 8 x 39.  Following this we did not proceed with any further runoffs to the lower extremity due to elevated serum creatinine level.  At this time I will return in 2-3 weeks to proceed with a right femoral to left femoral  bypass with graft      Please note that portions of this note were completed with a voice recognition program. Efforts were made to edit the dictations, but occasionally words are mistranscribed.

## 2018-09-04 NOTE — H&P
Pre-Op H&P  Vivi Son  [id number]  1945    Chief complaint: Left leg pain, claudication    HPI:    Patient is a 73 y.o.female who presents with a history of peripheral vascular disease presents with claudication of the left thigh, buttocks and calf in less than 50 ft. (>90% left iliac stenosis)    Review of Systems:  General ROS: negative for chills, fever or skin lesions;  No changes since last office visit  Cardiovascular ROS: no chest pain or dyspnea on exertion  Respiratory ROS: no cough, shortness of breath, or wheezing    Allergies:   Allergies   Allergen Reactions   • Lortab [Hydrocodone-Acetaminophen] Hallucinations   • Percocet [Oxycodone-Acetaminophen] Hallucinations       Home Meds:    No current facility-administered medications on file prior to encounter.      Current Outpatient Prescriptions on File Prior to Encounter   Medication Sig Dispense Refill   • acetaminophen (TYLENOL) 500 MG tablet Take 500 mg by mouth Every 6 (Six) Hours As Needed for Mild Pain .     • albuterol (PROVENTIL) (2.5 MG/3ML) 0.083% nebulizer solution Take 2.5 mg by nebulization Every 4 (Four) Hours As Needed for Wheezing.     • amLODIPine (NORVASC) 5 MG tablet Take 5 mg by mouth Daily.     • aspirin 81 MG chewable tablet Chew 81 mg Daily.     • atorvastatin (LIPITOR) 80 MG tablet Take 80 mg by mouth Daily.     • carvedilol (COREG) 25 MG tablet Take 25 mg by mouth 2 (Two) Times a Day With Meals.     • chlorthalidone (HYGROTON) 25 MG tablet Take 25 mg by mouth Daily.     • Cholecalciferol (VITAMIN D3) 1000 units capsule Take 1,000 Units by mouth Daily.     • clopidogrel (PLAVIX) 75 MG tablet Take 75 mg by mouth Daily.     • furosemide (LASIX) 20 MG tablet Take 20 mg by mouth Every Other Day.     • gabapentin (NEURONTIN) 300 MG capsule Take 300 mg by mouth 3 (Three) Times a Day.     • Insulin Degludec (TRESIBA FLEXTOUCH SC) Inject  under the skin into the appropriate area as directed.     • insulin NPH-insulin regular  (humuLIN 70/30,novoLIN 70/30) (70-30) 100 UNIT/ML injection Inject 20 Units under the skin into the appropriate area as directed 2 (Two) Times a Day With Meals. 20 units in AM and 18 units in PM     • irbesartan (AVAPRO) 300 MG tablet Take 300 mg by mouth Every Night.     • pantoprazole (PROTONIX) 40 MG EC tablet Take 40 mg by mouth 2 (Two) Times a Day.     • spironolactone (ALDACTONE) 100 MG tablet Take 100 mg by mouth Daily.     • SUPER B COMPLEX/C capsule Take  by mouth Daily.     • tiotropium (SPIRIVA) 18 MCG per inhalation capsule Place 1 capsule into inhaler and inhale Daily.     • vitamin B-12 (CYANOCOBALAMIN) 500 MCG tablet Take 500 mcg by mouth Daily.     • vitamin C (ASCORBIC ACID) 500 MG tablet Take 500 mg by mouth Daily.         PMH:   Past Medical History:   Diagnosis Date   • Anemia    • Anxiety    • Arthritis    • Aspiration of food     pill   • Bleeding ulcer    • Blood transfusion abn reaction or complication, no procedure mishap    • CAD (coronary artery disease)    • CHF (congestive heart failure) (CMS/[facility])    • Chronic kidney disease    • COPD (chronic obstructive pulmonary disease) (CMS/[facility])    • Depression    • Diabetes mellitus type II, controlled (CMS/[facility])    • GERD (gastroesophageal reflux disease)    • History of bronchitis    • History of pneumonia    • History of transfusion    • HTN (hypertension)    • Hyperlipidemia    • MI (myocardial infarction)    • Non-small cell carcinoma of right lung (CMS/HCC)    • PUD (peptic ulcer disease)    • PVD (peripheral vascular disease) (CMS/[facility])    • Thyroid disease    • Tremors of nervous system    • Wears dentures    • Wears glasses      PSH:    Past Surgical History:   Procedure Laterality Date   • APPENDECTOMY  1978   • CARDIAC CATHETERIZATION     • CARPAL TUNNEL RELEASE Right    • COLONOSCOPY     • CORONARY ANGIOPLASTY WITH STENT PLACEMENT      [facility] - unknown physician name    • EAR TUBES     • ENDOSCOPY     • LUMBAR SPINE SURGERY    "  • LUNG LOBECTOMY Right     RUL    • SACROCOCCYGEAL ULCER REMOVAL     • VOCAL CORD BIOPSY         Immunization History:  Influenza: 2017  Pneumococcal: 2017  Tetanus: patient unsure    Social History:   Tobacco:   History   Smoking Status   • Current Every Day Smoker   • Packs/day: 1.50   • Years: 60.00   • Types: Cigarettes   Smokeless Tobacco   • Never Used      Alcohol:     History   Alcohol Use No       Vitals:           /79 (BP Location: Right arm, Patient Position: Lying)   Pulse 70   Temp 97.1 °F (36.2 °C) (Tympanic)   Resp 16   Ht 154.9 cm (61\")   Wt 73.9 kg (163 lb)   SpO2 96%   BMI 30.80 kg/m²     Physical Exam:  General Appearance:    Alert, cooperative, no distress, appears stated age   Head:    Normocephalic, without obvious abnormality, atraumatic   Lungs:     Bilateral expiratory wheezes    Heart:   Regular rate and rhythm, S1 and S2 normal, no murmur, rub    or gallop    Abdomen:    Soft, non-tender.  +bowel sounds   Breast Exam:    deferred   Genitalia:    deferred   Extremities:   Extremities normal, atraumatic, no cyanosis or edema   Skin:   Skin color, texture, turgor normal, no rashes or lesions   Neurologic:   Grossly intact     Cancer Staging (if applicable)  Cancer Patient: __ yes _x_no __unknown; If yes, clinical stage T:__ N:__M:__, stage group or __N/A    Impression: Peripheral vascular disease    Plan: Abdominal aortogram with peripheral runoff. Possible angioplasty and stent  Patient is once again apprised of the risk of bleeding and limb loss contrast reaction and nephrotoxicity.  These risks were explained previously in the office and then again today for the second time.  No guarantees are made as to outcome she agrees to proceed  KADI Puckett   9/4/2018   12:20 PM  "

## 2018-09-05 VITALS
RESPIRATION RATE: 18 BRPM | SYSTOLIC BLOOD PRESSURE: 146 MMHG | WEIGHT: 163 LBS | DIASTOLIC BLOOD PRESSURE: 58 MMHG | OXYGEN SATURATION: 93 % | BODY MASS INDEX: 30.78 KG/M2 | TEMPERATURE: 98.5 F | HEART RATE: 82 BPM | HEIGHT: 61 IN

## 2018-09-05 LAB
ANION GAP SERPL CALCULATED.3IONS-SCNC: 9 MMOL/L (ref 3–11)
BASOPHILS # BLD AUTO: 0.03 10*3/MM3 (ref 0–0.2)
BASOPHILS NFR BLD AUTO: 0.2 % (ref 0–1)
BUN BLD-MCNC: 28 MG/DL (ref 9–23)
BUN/CREAT SERPL: 24.6 (ref 7–25)
CALCIUM SPEC-SCNC: 8.5 MG/DL (ref 8.7–10.4)
CHLORIDE SERPL-SCNC: 115 MMOL/L (ref 99–109)
CO2 SERPL-SCNC: 20 MMOL/L (ref 20–31)
CREAT BLD-MCNC: 1.14 MG/DL (ref 0.6–1.3)
DEPRECATED RDW RBC AUTO: 49.1 FL (ref 37–54)
EOSINOPHIL # BLD AUTO: 0.25 10*3/MM3 (ref 0–0.3)
EOSINOPHIL NFR BLD AUTO: 2 % (ref 0–3)
ERYTHROCYTE [DISTWIDTH] IN BLOOD BY AUTOMATED COUNT: 13.2 % (ref 11.3–14.5)
GFR SERPL CREATININE-BSD FRML MDRD: 47 ML/MIN/1.73
GLUCOSE BLD-MCNC: 92 MG/DL (ref 70–100)
GLUCOSE BLDC GLUCOMTR-MCNC: 123 MG/DL (ref 70–130)
HCT VFR BLD AUTO: 33.2 % (ref 34.5–44)
HGB BLD-MCNC: 10.7 G/DL (ref 11.5–15.5)
IMM GRANULOCYTES # BLD: 0.04 10*3/MM3 (ref 0–0.03)
IMM GRANULOCYTES NFR BLD: 0.3 % (ref 0–0.6)
LYMPHOCYTES # BLD AUTO: 1.84 10*3/MM3 (ref 0.6–4.8)
LYMPHOCYTES NFR BLD AUTO: 14.9 % (ref 24–44)
MCH RBC QN AUTO: 32.7 PG (ref 27–31)
MCHC RBC AUTO-ENTMCNC: 32.2 G/DL (ref 32–36)
MCV RBC AUTO: 101.5 FL (ref 80–99)
MONOCYTES # BLD AUTO: 0.9 10*3/MM3 (ref 0–1)
MONOCYTES NFR BLD AUTO: 7.3 % (ref 0–12)
NEUTROPHILS # BLD AUTO: 9.32 10*3/MM3 (ref 1.5–8.3)
NEUTROPHILS NFR BLD AUTO: 75.6 % (ref 41–71)
PLATELET # BLD AUTO: 212 10*3/MM3 (ref 150–450)
PMV BLD AUTO: 9.6 FL (ref 6–12)
POTASSIUM BLD-SCNC: 4.6 MMOL/L (ref 3.5–5.5)
RBC # BLD AUTO: 3.27 10*6/MM3 (ref 3.89–5.14)
SODIUM BLD-SCNC: 144 MMOL/L (ref 132–146)
WBC NRBC COR # BLD: 12.34 10*3/MM3 (ref 3.5–10.8)

## 2018-09-05 PROCEDURE — A9270 NON-COVERED ITEM OR SERVICE: HCPCS | Performed by: PHYSICIAN ASSISTANT

## 2018-09-05 PROCEDURE — 63710000001 FERROUS SULFATE 325 (65 FE) MG TABLET: Performed by: PHYSICIAN ASSISTANT

## 2018-09-05 PROCEDURE — 63710000001 VITAMIN B-12 1000 MCG TABLET: Performed by: PHYSICIAN ASSISTANT

## 2018-09-05 PROCEDURE — 63710000001 AMLODIPINE 5 MG TABLET: Performed by: PHYSICIAN ASSISTANT

## 2018-09-05 PROCEDURE — 85025 COMPLETE CBC W/AUTO DIFF WBC: CPT | Performed by: PHYSICIAN ASSISTANT

## 2018-09-05 PROCEDURE — 63710000001 ACETAMINOPHEN 500 MG TABLET: Performed by: PHYSICIAN ASSISTANT

## 2018-09-05 PROCEDURE — 99212 OFFICE O/P EST SF 10 MIN: CPT | Performed by: THORACIC SURGERY (CARDIOTHORACIC VASCULAR SURGERY)

## 2018-09-05 PROCEDURE — 63710000001 LEVOTHYROXINE 25 MCG TABLET: Performed by: PHYSICIAN ASSISTANT

## 2018-09-05 PROCEDURE — 80048 BASIC METABOLIC PNL TOTAL CA: CPT | Performed by: PHYSICIAN ASSISTANT

## 2018-09-05 PROCEDURE — 63710000001 VITAMIN C 500 MG TABLET: Performed by: PHYSICIAN ASSISTANT

## 2018-09-05 PROCEDURE — 63710000001 PANTOPRAZOLE 40 MG TABLET DELAYED-RELEASE: Performed by: PHYSICIAN ASSISTANT

## 2018-09-05 PROCEDURE — 63710000001 CARVEDILOL 12.5 MG TABLET: Performed by: PHYSICIAN ASSISTANT

## 2018-09-05 PROCEDURE — 63710000001 GABAPENTIN 300 MG CAPSULE: Performed by: THORACIC SURGERY (CARDIOTHORACIC VASCULAR SURGERY)

## 2018-09-05 PROCEDURE — A9270 NON-COVERED ITEM OR SERVICE: HCPCS | Performed by: THORACIC SURGERY (CARDIOTHORACIC VASCULAR SURGERY)

## 2018-09-05 PROCEDURE — 63710000001 FUROSEMIDE 40 MG TABLET: Performed by: PHYSICIAN ASSISTANT

## 2018-09-05 PROCEDURE — 63710000001 ATORVASTATIN 40 MG TABLET: Performed by: PHYSICIAN ASSISTANT

## 2018-09-05 PROCEDURE — 94760 N-INVAS EAR/PLS OXIMETRY 1: CPT

## 2018-09-05 PROCEDURE — 94640 AIRWAY INHALATION TREATMENT: CPT

## 2018-09-05 PROCEDURE — 82962 GLUCOSE BLOOD TEST: CPT

## 2018-09-05 PROCEDURE — 63710000001 SPIRONOLACTONE 25 MG TABLET: Performed by: PHYSICIAN ASSISTANT

## 2018-09-05 PROCEDURE — G0378 HOSPITAL OBSERVATION PER HR: HCPCS

## 2018-09-05 PROCEDURE — 63710000001 CLOPIDOGREL 75 MG TABLET: Performed by: PHYSICIAN ASSISTANT

## 2018-09-05 PROCEDURE — 63710000001 CHLORTHALIDONE 25 MG TABLET: Performed by: PHYSICIAN ASSISTANT

## 2018-09-05 PROCEDURE — 63710000001 ASPIRIN 81 MG CHEWABLE TABLET: Performed by: PHYSICIAN ASSISTANT

## 2018-09-05 PROCEDURE — 63710000001 SODIUM BICARBONATE 650 MG TABLET: Performed by: PHYSICIAN ASSISTANT

## 2018-09-05 RX ADMIN — SPIRONOLACTONE 100 MG: 25 TABLET ORAL at 09:18

## 2018-09-05 RX ADMIN — ATORVASTATIN CALCIUM 80 MG: 40 TABLET, FILM COATED ORAL at 09:13

## 2018-09-05 RX ADMIN — CARVEDILOL 25 MG: 12.5 TABLET, FILM COATED ORAL at 09:14

## 2018-09-05 RX ADMIN — CYANOCOBALAMIN TAB 1000 MCG 500 MCG: 1000 TAB at 09:15

## 2018-09-05 RX ADMIN — Medication 325 MG: at 09:15

## 2018-09-05 RX ADMIN — SODIUM CHLORIDE 100 ML/HR: 4.5 INJECTION, SOLUTION INTRAVENOUS at 06:22

## 2018-09-05 RX ADMIN — LEVOTHYROXINE SODIUM 25 MCG: 25 TABLET ORAL at 06:23

## 2018-09-05 RX ADMIN — OXYCODONE HYDROCHLORIDE AND ACETAMINOPHEN 500 MG: 500 TABLET ORAL at 09:14

## 2018-09-05 RX ADMIN — PANTOPRAZOLE SODIUM 40 MG: 40 TABLET, DELAYED RELEASE ORAL at 09:13

## 2018-09-05 RX ADMIN — IPRATROPIUM BROMIDE 0.5 MG: 0.5 SOLUTION RESPIRATORY (INHALATION) at 08:48

## 2018-09-05 RX ADMIN — ACETAMINOPHEN 500 MG: 500 TABLET, FILM COATED ORAL at 02:16

## 2018-09-05 RX ADMIN — GABAPENTIN 300 MG: 300 CAPSULE ORAL at 06:23

## 2018-09-05 RX ADMIN — CHLORTHALIDONE 25 MG: 25 TABLET ORAL at 09:16

## 2018-09-05 RX ADMIN — ASPIRIN 81 MG 81 MG: 81 TABLET ORAL at 09:15

## 2018-09-05 RX ADMIN — FUROSEMIDE 20 MG: 40 TABLET ORAL at 09:14

## 2018-09-05 RX ADMIN — SODIUM BICARBONATE 650 MG TABLET 650 MG: at 09:13

## 2018-09-05 RX ADMIN — AMLODIPINE BESYLATE 5 MG: 5 TABLET ORAL at 09:15

## 2018-09-05 RX ADMIN — CLOPIDOGREL BISULFATE 75 MG: 75 TABLET ORAL at 09:15

## 2018-09-05 NOTE — PLAN OF CARE
Problem: Patient Care Overview  Goal: Plan of Care Review  Outcome: Ongoing (interventions implemented as appropriate)   09/05/18 0444   Coping/Psychosocial   Plan of Care Reviewed With patient   Plan of Care Review   Progress improving   OTHER   Outcome Summary Pt's vitals stable, jet groin sites remained clean and soft. Pt c/o chronic restless leg syndrome - Tylenol given and heat applied - pt reported relief. Pt remains on bed rest until 7 am today. Pt had no other complaints or events through out the night.      Goal: Discharge Needs Assessment  Outcome: Ongoing (interventions implemented as appropriate)   09/04/18 1800 09/05/18 0444   Discharge Needs Assessment   Concerns to be Addressed --  denies needs/concerns at this time   Patient/Family Anticipates Transition to --  home   Transportation Concerns car, none --        Problem: Tissue Perfusion, Ineffective Peripheral (Adult)  Goal: Identify Related Risk Factors and Signs and Symptoms  Outcome: Ongoing (interventions implemented as appropriate)   09/05/18 0444   Tissue Perfusion, Ineffective Peripheral (Adult)   Related Risk Factors (Ineffective Peripheral Tissue Perfusion) arterial flow reduced;diabetes mellitus   Signs and Symptoms (Ineffective Peripheral Tissue Perfusion) muscle weakness     Goal: Adequate Tissue Perfusion  Outcome: Ongoing (interventions implemented as appropriate)   09/05/18 0444   Tissue Perfusion, Ineffective Peripheral (Adult)   Adequate Tissue Perfusion making progress toward outcome

## 2018-09-05 NOTE — PROGRESS NOTES
Discharge Planning Assessment  [facility]     Patient Name: Vivi Son  MRN: [id number]  Today's Date: 9/5/2018    Admit Date: 9/4/2018          Discharge Needs Assessment     Row Name 09/05/18 1126       Living Environment    Lives With alone    Current Living Arrangements home/apartment/condo   Lives in [facility]    Primary Care Provided by self    Provides Primary Care For no one    Family Caregiver if Needed child(cammy), adult;significant other    Quality of Family Relationships helpful;involved;supportive    Able to Return to Prior Arrangements yes       Resource/Environmental Concerns    Resource/Environmental Concerns none    Transportation Concerns car, none       Transition Planning    Patient/Family Anticipates Transition to home    Patient/Family Anticipated Services at Transition none    Transportation Anticipated family or friend will provide       Discharge Needs Assessment    Concerns to be Addressed denies needs/concerns at this time    Equipment Currently Used at Home walker, standard    Anticipated Changes Related to Illness none    Equipment Needed After Discharge none    Current Discharge Risk lives alone            Discharge Plan     Row Name 09/05/18 1127       Plan    Plan Home    Patient/Family in Agreement with Plan yes    Plan Comments Pt being d/c'd. Lives alone in [facility]. Denies HH. Has a walker but doesn't use. Is indepndent of ADL's. Plan is home. No needs. CM will cont to follow.     Final Discharge Disposition Code 01 - home or self-care        Destination     No service coordination in this encounter.      Durable Medical Equipment     No service coordination in this encounter.      Dialysis/Infusion     No service coordination in this encounter.      Home Medical Care     No service coordination in this encounter.      Social Care     No service coordination in this encounter.        Expected Discharge Date and Time     Expected Discharge Date Expected Discharge  Time    Sep 5, 2018               Demographic Summary     Row Name 09/05/18 1125       General Information    Referral Source admission list    Preferred Language English    General Information Comments PCP is Tess Recio       Contact Information    Permission Granted to Share Info With             Functional Status     Row Name 09/05/18 1125       Functional Status    Usual Activity Tolerance good    Current Activity Tolerance good       Functional Status, IADL    Medications independent    Meal Preparation independent    Housekeeping independent    Laundry independent    Shopping independent       Employment/    Employment/ Comments Has [employer] Medicare with no concerns.             Psychosocial    No documentation.           Abuse/Neglect    No documentation.           Legal    No documentation.           Substance Abuse    No documentation.           Patient Forms    No documentation.         Bianka Armstrong

## 2018-09-05 NOTE — PLAN OF CARE
Problem: Patient Care Overview  Goal: Plan of Care Review  Outcome: Ongoing (interventions implemented as appropriate)   09/05/18 0917   Coping/Psychosocial   Plan of Care Reviewed With patient   Plan of Care Review   Progress no change   OTHER   Out[facility] walked this morning. Did well. Really for d/c.

## 2018-09-05 NOTE — PROGRESS NOTES
CTS Progress Note       LOS: 0 days   Patient Care Team:  Tess Recio APRN as PCP - General (Family Medicine)  Provider, No Known as PCP - Family Medicine  Rudi Vaughn MD as Cardiologist (Cardiology)    Chief Complaint: Peripheral vascular disease (CMS/HCC)    Vital Signs:  Temp:  [97 °F (36.1 °C)-98.5 °F (36.9 °C)] 98.5 °F (36.9 °C)  Heart Rate:  [61-86] 81  Resp:  [16-18] 18  BP: (113-190)/(51-85) 145/85    Physical Exam: Feet warm and viable groin cannulation site is satisfactory       Results:     Results from last 7 days  Lab Units 09/05/18  0426   WBC 10*3/mm3 12.34*   HEMOGLOBIN g/dL 10.7*   HEMATOCRIT % 33.2*   PLATELETS 10*3/mm3 212       Results from last 7 days  Lab Units 09/05/18  0426   SODIUM mmol/L 144   POTASSIUM mmol/L 4.6   CHLORIDE mmol/L 115*   CO2 mmol/L 20.0   BUN mg/dL 28*   CREATININE mg/dL 1.14   GLUCOSE mg/dL 92   CALCIUM mg/dL 8.5*           Imaging Results (last 24 hours)     Procedure Component Value Units Date/Time    [facility] [facility] Procedure [[id number]] Resulted:  09/04/18 1316     Updated:  09/04/18 1316          Assessment    Principal Problem:    Peripheral vascular disease (CMS/HCC)  Active Problems:    PAD (peripheral artery disease) (CMS/HCC)    Status post right common iliac stenting which extended into the aorta.  Patient also has an occluded left iliac.  Plan for discharge home today patient will continue her home Plavix and aspirin.  Will schedule return in approximately 2 weeks for right femoral to left femoral bypass.    Plan   Discharge home today    Please note that portions of this note were completed with a voice recognition program. Efforts were made to edit the dictations, but occasionally words are mistranscribed.    Triston Floyd MD  09/05/18  8:17 AM

## 2018-09-19 DIAGNOSIS — I73.9 PAD (PERIPHERAL ARTERY DISEASE) (HCC): Primary | ICD-10-CM

## 2018-09-20 ENCOUNTER — APPOINTMENT (OUTPATIENT)
Dept: PREADMISSION TESTING | Facility: HOSPITAL | Age: 73
End: 2018-09-20

## 2018-09-20 ENCOUNTER — HOSPITAL ENCOUNTER (OUTPATIENT)
Dept: GENERAL RADIOLOGY | Facility: HOSPITAL | Age: 73
Discharge: HOME | End: 2018-09-20
Admitting: PHYSICIAN ASSISTANT

## 2018-09-20 ENCOUNTER — PREP FOR SURGERY (OUTPATIENT)
Dept: OTHER | Facility: HOSPITAL | Age: 73
End: 2018-09-20

## 2018-09-20 VITALS — BODY MASS INDEX: 30.76 KG/M2 | HEIGHT: 61 IN | WEIGHT: 162.92 LBS

## 2018-09-20 DIAGNOSIS — I73.9 PAD (PERIPHERAL ARTERY DISEASE) (HCC): ICD-10-CM

## 2018-09-20 DIAGNOSIS — I73.9 PAD (PERIPHERAL ARTERY DISEASE) (HCC): Primary | ICD-10-CM

## 2018-09-20 LAB
ABO GROUP BLD: NORMAL
ANION GAP SERPL CALCULATED.3IONS-SCNC: -1 MMOL/L (ref 3–11)
APTT PPP: 25 SECONDS (ref 24–31)
BLD GP AB SCN SERPL QL: NEGATIVE
BUN BLD-MCNC: 36 MG/DL (ref 9–23)
BUN/CREAT SERPL: 24.3 (ref 7–25)
CALCIUM SPEC-SCNC: 8.7 MG/DL (ref 8.7–10.4)
CHLORIDE SERPL-SCNC: 114 MMOL/L (ref 99–109)
CO2 SERPL-SCNC: 23 MMOL/L (ref 20–31)
CREAT BLD-MCNC: 1.48 MG/DL (ref 0.6–1.3)
DEPRECATED RDW RBC AUTO: 50.2 FL (ref 37–54)
ERYTHROCYTE [DISTWIDTH] IN BLOOD BY AUTOMATED COUNT: 13.6 % (ref 11.3–14.5)
GFR SERPL CREATININE-BSD FRML MDRD: 35 ML/MIN/1.73
GLUCOSE BLD-MCNC: 175 MG/DL (ref 70–100)
HBA1C MFR BLD: 8.4 % (ref 4.8–5.6)
HCT VFR BLD AUTO: 33 % (ref 34.5–44)
HGB BLD-MCNC: 10.9 G/DL (ref 11.5–15.5)
INR PPP: 0.96 (ref 0.91–1.09)
MCH RBC QN AUTO: 33.6 PG (ref 27–31)
MCHC RBC AUTO-ENTMCNC: 33 G/DL (ref 32–36)
MCV RBC AUTO: 101.9 FL (ref 80–99)
PLATELET # BLD AUTO: 255 10*3/MM3 (ref 150–450)
PMV BLD AUTO: 9.4 FL (ref 6–12)
POTASSIUM BLD-SCNC: 5.1 MMOL/L (ref 3.5–5.5)
PROTHROMBIN TIME: 10.1 SECONDS (ref 9.6–11.5)
RBC # BLD AUTO: 3.24 10*6/MM3 (ref 3.89–5.14)
RH BLD: POSITIVE
SODIUM BLD-SCNC: 136 MMOL/L (ref 132–146)
T&S EXPIRATION DATE: NORMAL
WBC NRBC COR # BLD: 14.52 10*3/MM3 (ref 3.5–10.8)

## 2018-09-20 PROCEDURE — 85730 THROMBOPLASTIN TIME PARTIAL: CPT | Performed by: PHYSICIAN ASSISTANT

## 2018-09-20 PROCEDURE — 86850 RBC ANTIBODY SCREEN: CPT | Performed by: THORACIC SURGERY (CARDIOTHORACIC VASCULAR SURGERY)

## 2018-09-20 PROCEDURE — 85610 PROTHROMBIN TIME: CPT | Performed by: PHYSICIAN ASSISTANT

## 2018-09-20 PROCEDURE — 86900 BLOOD TYPING SEROLOGIC ABO: CPT | Performed by: THORACIC SURGERY (CARDIOTHORACIC VASCULAR SURGERY)

## 2018-09-20 PROCEDURE — 85027 COMPLETE CBC AUTOMATED: CPT | Performed by: PHYSICIAN ASSISTANT

## 2018-09-20 PROCEDURE — 80048 BASIC METABOLIC PNL TOTAL CA: CPT | Performed by: PHYSICIAN ASSISTANT

## 2018-09-20 PROCEDURE — 36415 COLL VENOUS BLD VENIPUNCTURE: CPT

## 2018-09-20 PROCEDURE — 71046 X-RAY EXAM CHEST 2 VIEWS: CPT

## 2018-09-20 PROCEDURE — 86901 BLOOD TYPING SEROLOGIC RH(D): CPT | Performed by: THORACIC SURGERY (CARDIOTHORACIC VASCULAR SURGERY)

## 2018-09-20 PROCEDURE — 83036 HEMOGLOBIN GLYCOSYLATED A1C: CPT | Performed by: PHYSICIAN ASSISTANT

## 2018-09-20 RX ORDER — CHLORHEXIDINE GLUCONATE 500 MG/1
1 CLOTH TOPICAL EVERY 12 HOURS PRN
Status: CANCELLED | OUTPATIENT
Start: 2018-09-20

## 2018-09-21 ENCOUNTER — ANESTHESIA (OUTPATIENT)
Dept: PERIOP | Facility: HOSPITAL | Age: 73
End: 2018-09-21

## 2018-09-21 ENCOUNTER — HOSPITAL ENCOUNTER (INPATIENT)
Facility: HOSPITAL | Age: 73
LOS: 3 days | Discharge: HOME | End: 2018-09-24
Attending: THORACIC SURGERY (CARDIOTHORACIC VASCULAR SURGERY) | Admitting: THORACIC SURGERY (CARDIOTHORACIC VASCULAR SURGERY)

## 2018-09-21 ENCOUNTER — ANESTHESIA EVENT (OUTPATIENT)
Dept: PERIOP | Facility: HOSPITAL | Age: 73
End: 2018-09-21

## 2018-09-21 DIAGNOSIS — I73.9 PAD (PERIPHERAL ARTERY DISEASE) (HCC): ICD-10-CM

## 2018-09-21 PROBLEM — E11.9 DIABETES MELLITUS TYPE II, CONTROLLED: Status: ACTIVE | Noted: 2018-09-21

## 2018-09-21 PROBLEM — I10 HTN (HYPERTENSION): Status: ACTIVE | Noted: 2018-09-21

## 2018-09-21 PROBLEM — K21.9 GERD (GASTROESOPHAGEAL REFLUX DISEASE): Status: ACTIVE | Noted: 2018-09-21

## 2018-09-21 PROBLEM — E78.5 HYPERLIPIDEMIA: Status: ACTIVE | Noted: 2018-09-21

## 2018-09-21 PROBLEM — Z72.0 TOBACCO ABUSE: Status: ACTIVE | Noted: 2018-09-21

## 2018-09-21 PROBLEM — E03.9 HYPOTHYROIDISM: Status: ACTIVE | Noted: 2018-09-21

## 2018-09-21 PROBLEM — N18.9 CHRONIC KIDNEY DISEASE: Status: ACTIVE | Noted: 2018-09-21

## 2018-09-21 PROBLEM — I25.10 CAD (CORONARY ARTERY DISEASE): Status: ACTIVE | Noted: 2018-09-21

## 2018-09-21 PROBLEM — J44.9 COPD (CHRONIC OBSTRUCTIVE PULMONARY DISEASE): Status: ACTIVE | Noted: 2018-09-21

## 2018-09-21 LAB
ABO GROUP BLD: NORMAL
BLD GP AB SCN SERPL QL: NEGATIVE
GLUCOSE BLDC GLUCOMTR-MCNC: 174 MG/DL (ref 70–130)
GLUCOSE BLDC GLUCOMTR-MCNC: 240 MG/DL (ref 70–130)
GLUCOSE BLDC GLUCOMTR-MCNC: 255 MG/DL (ref 70–130)
GLUCOSE BLDC GLUCOMTR-MCNC: 309 MG/DL (ref 70–130)
RH BLD: POSITIVE
T&S EXPIRATION DATE: NORMAL

## 2018-09-21 PROCEDURE — 35661 BPG FEMORAL-FEMORAL: CPT | Performed by: THORACIC SURGERY (CARDIOTHORACIC VASCULAR SURGERY)

## 2018-09-21 PROCEDURE — 94640 AIRWAY INHALATION TREATMENT: CPT

## 2018-09-21 PROCEDURE — 86850 RBC ANTIBODY SCREEN: CPT | Performed by: THORACIC SURGERY (CARDIOTHORACIC VASCULAR SURGERY)

## 2018-09-21 PROCEDURE — 99233 SBSQ HOSP IP/OBS HIGH 50: CPT | Performed by: INTERNAL MEDICINE

## 2018-09-21 PROCEDURE — 25010000002 HEPARIN (PORCINE) PER 1000 UNITS: Performed by: THORACIC SURGERY (CARDIOTHORACIC VASCULAR SURGERY)

## 2018-09-21 PROCEDURE — 25010000002 CEFUROXIME: Performed by: PHYSICIAN ASSISTANT

## 2018-09-21 PROCEDURE — 25010000002 DEXAMETHASONE PER 1 MG: Performed by: NURSE ANESTHETIST, CERTIFIED REGISTERED

## 2018-09-21 PROCEDURE — 86901 BLOOD TYPING SEROLOGIC RH(D): CPT | Performed by: THORACIC SURGERY (CARDIOTHORACIC VASCULAR SURGERY)

## 2018-09-21 PROCEDURE — 86923 COMPATIBILITY TEST ELECTRIC: CPT

## 2018-09-21 PROCEDURE — 86900 BLOOD TYPING SEROLOGIC ABO: CPT | Performed by: THORACIC SURGERY (CARDIOTHORACIC VASCULAR SURGERY)

## 2018-09-21 PROCEDURE — 25010000002 MORPHINE PER 10 MG: Performed by: THORACIC SURGERY (CARDIOTHORACIC VASCULAR SURGERY)

## 2018-09-21 PROCEDURE — 94760 N-INVAS EAR/PLS OXIMETRY 1: CPT

## 2018-09-21 PROCEDURE — 63710000001 INSULIN LISPRO (HUMAN) PER 5 UNITS

## 2018-09-21 PROCEDURE — 25010000002 PROTAMINE SULFATE PER 10 MG: Performed by: NURSE ANESTHETIST, CERTIFIED REGISTERED

## 2018-09-21 PROCEDURE — 88311 DECALCIFY TISSUE: CPT | Performed by: THORACIC SURGERY (CARDIOTHORACIC VASCULAR SURGERY)

## 2018-09-21 PROCEDURE — 25010000002 PROPOFOL 10 MG/ML EMULSION: Performed by: NURSE ANESTHETIST, CERTIFIED REGISTERED

## 2018-09-21 PROCEDURE — 63710000001 INSULIN LISPRO (HUMAN) PER 5 UNITS: Performed by: PHYSICIAN ASSISTANT

## 2018-09-21 PROCEDURE — 35372 RECHANNELING OF ARTERY: CPT | Performed by: THORACIC SURGERY (CARDIOTHORACIC VASCULAR SURGERY)

## 2018-09-21 PROCEDURE — 94799 UNLISTED PULMONARY SVC/PX: CPT

## 2018-09-21 PROCEDURE — 04CL0ZZ EXTIRPATION OF MATTER FROM LEFT FEMORAL ARTERY, OPEN APPROACH: ICD-10-PCS | Performed by: THORACIC SURGERY (CARDIOTHORACIC VASCULAR SURGERY)

## 2018-09-21 PROCEDURE — 82962 GLUCOSE BLOOD TEST: CPT

## 2018-09-21 PROCEDURE — 041K0JJ BYPASS RIGHT FEMORAL ARTERY TO LEFT FEMORAL ARTERY WITH SYNTHETIC SUBSTITUTE, OPEN APPROACH: ICD-10-PCS | Performed by: THORACIC SURGERY (CARDIOTHORACIC VASCULAR SURGERY)

## 2018-09-21 PROCEDURE — 25010000002 FENTANYL CITRATE (PF) 100 MCG/2ML SOLUTION: Performed by: NURSE ANESTHETIST, CERTIFIED REGISTERED

## 2018-09-21 PROCEDURE — 04UL0JZ SUPPLEMENT LEFT FEMORAL ARTERY WITH SYNTHETIC SUBSTITUTE, OPEN APPROACH: ICD-10-PCS | Performed by: THORACIC SURGERY (CARDIOTHORACIC VASCULAR SURGERY)

## 2018-09-21 PROCEDURE — 88304 TISSUE EXAM BY PATHOLOGIST: CPT | Performed by: THORACIC SURGERY (CARDIOTHORACIC VASCULAR SURGERY)

## 2018-09-21 PROCEDURE — 35661 BPG FEMORAL-FEMORAL: CPT | Performed by: PHYSICIAN ASSISTANT

## 2018-09-21 PROCEDURE — 25010000002 ONDANSETRON PER 1 MG: Performed by: NURSE ANESTHETIST, CERTIFIED REGISTERED

## 2018-09-21 PROCEDURE — 25010000002 HEPARIN (PORCINE) PER 1000 UNITS: Performed by: NURSE ANESTHETIST, CERTIFIED REGISTERED

## 2018-09-21 PROCEDURE — C1768 GRAFT, VASCULAR: HCPCS | Performed by: THORACIC SURGERY (CARDIOTHORACIC VASCULAR SURGERY)

## 2018-09-21 DEVICE — GRFT VASC PROPAT THNSTRCH REMVRNG8X30X40: Type: IMPLANTABLE DEVICE | Site: ARTERY FEMORAL | Status: FUNCTIONAL

## 2018-09-21 RX ORDER — THROMBIN HUMAN AND FIBRINOGEN 2; 5.5 [USP'U]/1; MG/1
PATCH TOPICAL AS NEEDED
Status: DISCONTINUED | OUTPATIENT
Start: 2018-09-21 | End: 2018-09-21 | Stop reason: HOSPADM

## 2018-09-21 RX ORDER — LOSARTAN POTASSIUM 50 MG/1
100 TABLET ORAL NIGHTLY
Status: DISCONTINUED | OUTPATIENT
Start: 2018-09-21 | End: 2018-09-21

## 2018-09-21 RX ORDER — CHLORTHALIDONE 25 MG/1
25 TABLET ORAL DAILY
Status: DISCONTINUED | OUTPATIENT
Start: 2018-09-22 | End: 2018-09-21

## 2018-09-21 RX ORDER — ONDANSETRON 2 MG/ML
INJECTION INTRAMUSCULAR; INTRAVENOUS AS NEEDED
Status: DISCONTINUED | OUTPATIENT
Start: 2018-09-21 | End: 2018-09-21 | Stop reason: SURG

## 2018-09-21 RX ORDER — ONDANSETRON 4 MG/1
4 TABLET, FILM COATED ORAL EVERY 6 HOURS PRN
Status: DISCONTINUED | OUTPATIENT
Start: 2018-09-21 | End: 2018-09-24 | Stop reason: HOSPADM

## 2018-09-21 RX ORDER — ASPIRIN 81 MG/1
81 TABLET, CHEWABLE ORAL DAILY
Status: DISCONTINUED | OUTPATIENT
Start: 2018-09-22 | End: 2018-09-24 | Stop reason: HOSPADM

## 2018-09-21 RX ORDER — FAMOTIDINE 10 MG/ML
20 INJECTION, SOLUTION INTRAVENOUS ONCE
Status: CANCELLED | OUTPATIENT
Start: 2018-09-21 | End: 2018-09-21

## 2018-09-21 RX ORDER — LEVOTHYROXINE SODIUM 0.03 MG/1
25 TABLET ORAL
Status: DISCONTINUED | OUTPATIENT
Start: 2018-09-22 | End: 2018-09-24 | Stop reason: HOSPADM

## 2018-09-21 RX ORDER — DEXAMETHASONE SODIUM PHOSPHATE 4 MG/ML
INJECTION, SOLUTION INTRA-ARTICULAR; INTRALESIONAL; INTRAMUSCULAR; INTRAVENOUS; SOFT TISSUE AS NEEDED
Status: DISCONTINUED | OUTPATIENT
Start: 2018-09-21 | End: 2018-09-21 | Stop reason: SURG

## 2018-09-21 RX ORDER — NICOTINE POLACRILEX 4 MG
15 LOZENGE BUCCAL
Status: DISCONTINUED | OUTPATIENT
Start: 2018-09-21 | End: 2018-09-24 | Stop reason: HOSPADM

## 2018-09-21 RX ORDER — SODIUM CHLORIDE 0.9 % (FLUSH) 0.9 %
1-10 SYRINGE (ML) INJECTION AS NEEDED
Status: DISCONTINUED | OUTPATIENT
Start: 2018-09-21 | End: 2018-09-21 | Stop reason: HOSPADM

## 2018-09-21 RX ORDER — ONDANSETRON 2 MG/ML
4 INJECTION INTRAMUSCULAR; INTRAVENOUS EVERY 6 HOURS PRN
Status: DISCONTINUED | OUTPATIENT
Start: 2018-09-21 | End: 2018-09-24 | Stop reason: HOSPADM

## 2018-09-21 RX ORDER — SODIUM BICARBONATE 650 MG/1
650 TABLET ORAL 2 TIMES DAILY
Status: DISCONTINUED | OUTPATIENT
Start: 2018-09-21 | End: 2018-09-24 | Stop reason: HOSPADM

## 2018-09-21 RX ORDER — AMLODIPINE BESYLATE 5 MG/1
5 TABLET ORAL DAILY
Status: DISCONTINUED | OUTPATIENT
Start: 2018-09-21 | End: 2018-09-24 | Stop reason: HOSPADM

## 2018-09-21 RX ORDER — LIDOCAINE HYDROCHLORIDE 10 MG/ML
0.5 INJECTION, SOLUTION EPIDURAL; INFILTRATION; INTRACAUDAL; PERINEURAL ONCE AS NEEDED
Status: COMPLETED | OUTPATIENT
Start: 2018-09-21 | End: 2018-09-21

## 2018-09-21 RX ORDER — SODIUM CHLORIDE, SODIUM LACTATE, POTASSIUM CHLORIDE, CALCIUM CHLORIDE 600; 310; 30; 20 MG/100ML; MG/100ML; MG/100ML; MG/100ML
9 INJECTION, SOLUTION INTRAVENOUS CONTINUOUS
Status: DISCONTINUED | OUTPATIENT
Start: 2018-09-21 | End: 2018-09-22

## 2018-09-21 RX ORDER — BISACODYL 10 MG
10 SUPPOSITORY, RECTAL RECTAL DAILY PRN
Status: DISCONTINUED | OUTPATIENT
Start: 2018-09-21 | End: 2018-09-24 | Stop reason: HOSPADM

## 2018-09-21 RX ORDER — LIDOCAINE HYDROCHLORIDE 10 MG/ML
INJECTION, SOLUTION EPIDURAL; INFILTRATION; INTRACAUDAL; PERINEURAL AS NEEDED
Status: DISCONTINUED | OUTPATIENT
Start: 2018-09-21 | End: 2018-09-21 | Stop reason: SURG

## 2018-09-21 RX ORDER — CLOPIDOGREL BISULFATE 75 MG/1
75 TABLET ORAL DAILY
Status: DISCONTINUED | OUTPATIENT
Start: 2018-09-22 | End: 2018-09-24 | Stop reason: HOSPADM

## 2018-09-21 RX ORDER — DEXTROSE MONOHYDRATE 25 G/50ML
25 INJECTION, SOLUTION INTRAVENOUS
Status: DISCONTINUED | OUTPATIENT
Start: 2018-09-21 | End: 2018-09-24 | Stop reason: HOSPADM

## 2018-09-21 RX ORDER — MEPERIDINE HYDROCHLORIDE 25 MG/ML
12.5 INJECTION INTRAMUSCULAR; INTRAVENOUS; SUBCUTANEOUS
Status: DISCONTINUED | OUTPATIENT
Start: 2018-09-21 | End: 2018-09-21 | Stop reason: HOSPADM

## 2018-09-21 RX ORDER — SODIUM CHLORIDE 450 MG/100ML
35 INJECTION, SOLUTION INTRAVENOUS CONTINUOUS
Status: DISCONTINUED | OUTPATIENT
Start: 2018-09-21 | End: 2018-09-22

## 2018-09-21 RX ORDER — PROPOFOL 10 MG/ML
VIAL (ML) INTRAVENOUS AS NEEDED
Status: DISCONTINUED | OUTPATIENT
Start: 2018-09-21 | End: 2018-09-21 | Stop reason: SURG

## 2018-09-21 RX ORDER — HEPARIN SODIUM 1000 [USP'U]/ML
INJECTION, SOLUTION INTRAVENOUS; SUBCUTANEOUS AS NEEDED
Status: DISCONTINUED | OUTPATIENT
Start: 2018-09-21 | End: 2018-09-21 | Stop reason: SURG

## 2018-09-21 RX ORDER — PROMETHAZINE HYDROCHLORIDE 25 MG/1
25 SUPPOSITORY RECTAL ONCE AS NEEDED
Status: DISCONTINUED | OUTPATIENT
Start: 2018-09-21 | End: 2018-09-21 | Stop reason: HOSPADM

## 2018-09-21 RX ORDER — FAMOTIDINE 20 MG/1
20 TABLET, FILM COATED ORAL ONCE
Status: DISCONTINUED | OUTPATIENT
Start: 2018-09-21 | End: 2018-09-21 | Stop reason: HOSPADM

## 2018-09-21 RX ORDER — ONDANSETRON 2 MG/ML
4 INJECTION INTRAMUSCULAR; INTRAVENOUS ONCE AS NEEDED
Status: DISCONTINUED | OUTPATIENT
Start: 2018-09-21 | End: 2018-09-21 | Stop reason: HOSPADM

## 2018-09-21 RX ORDER — ALBUTEROL SULFATE 2.5 MG/3ML
2.5 SOLUTION RESPIRATORY (INHALATION) EVERY 4 HOURS PRN
Status: DISCONTINUED | OUTPATIENT
Start: 2018-09-21 | End: 2018-09-24 | Stop reason: HOSPADM

## 2018-09-21 RX ORDER — FENTANYL CITRATE 50 UG/ML
50 INJECTION, SOLUTION INTRAMUSCULAR; INTRAVENOUS
Status: DISCONTINUED | OUTPATIENT
Start: 2018-09-21 | End: 2018-09-21 | Stop reason: HOSPADM

## 2018-09-21 RX ORDER — SPIRONOLACTONE 25 MG/1
50 TABLET ORAL NIGHTLY
Status: DISCONTINUED | OUTPATIENT
Start: 2018-09-21 | End: 2018-09-21

## 2018-09-21 RX ORDER — PROMETHAZINE HYDROCHLORIDE 25 MG/ML
6.25 INJECTION, SOLUTION INTRAMUSCULAR; INTRAVENOUS ONCE AS NEEDED
Status: DISCONTINUED | OUTPATIENT
Start: 2018-09-21 | End: 2018-09-21 | Stop reason: HOSPADM

## 2018-09-21 RX ORDER — FUROSEMIDE 40 MG/1
20 TABLET ORAL EVERY OTHER DAY
Status: DISCONTINUED | OUTPATIENT
Start: 2018-09-22 | End: 2018-09-21

## 2018-09-21 RX ORDER — MORPHINE SULFATE 4 MG/ML
2 INJECTION, SOLUTION INTRAMUSCULAR; INTRAVENOUS EVERY 4 HOURS PRN
Status: DISCONTINUED | OUTPATIENT
Start: 2018-09-21 | End: 2018-09-24 | Stop reason: HOSPADM

## 2018-09-21 RX ORDER — CARVEDILOL 12.5 MG/1
25 TABLET ORAL 2 TIMES DAILY WITH MEALS
Status: DISCONTINUED | OUTPATIENT
Start: 2018-09-21 | End: 2018-09-24 | Stop reason: HOSPADM

## 2018-09-21 RX ORDER — SODIUM CHLORIDE, SODIUM LACTATE, POTASSIUM CHLORIDE, CALCIUM CHLORIDE 600; 310; 30; 20 MG/100ML; MG/100ML; MG/100ML; MG/100ML
100 INJECTION, SOLUTION INTRAVENOUS CONTINUOUS
Status: ACTIVE | OUTPATIENT
Start: 2018-09-21 | End: 2018-09-22

## 2018-09-21 RX ORDER — SENNA AND DOCUSATE SODIUM 50; 8.6 MG/1; MG/1
2 TABLET, FILM COATED ORAL 2 TIMES DAILY PRN
Status: DISCONTINUED | OUTPATIENT
Start: 2018-09-21 | End: 2018-09-24 | Stop reason: HOSPADM

## 2018-09-21 RX ORDER — PANTOPRAZOLE SODIUM 40 MG/1
40 TABLET, DELAYED RELEASE ORAL EVERY 12 HOURS SCHEDULED
Status: DISCONTINUED | OUTPATIENT
Start: 2018-09-21 | End: 2018-09-24 | Stop reason: HOSPADM

## 2018-09-21 RX ORDER — FENTANYL CITRATE 50 UG/ML
INJECTION, SOLUTION INTRAMUSCULAR; INTRAVENOUS AS NEEDED
Status: DISCONTINUED | OUTPATIENT
Start: 2018-09-21 | End: 2018-09-21 | Stop reason: SURG

## 2018-09-21 RX ORDER — PROTAMINE SULFATE 10 MG/ML
INJECTION, SOLUTION INTRAVENOUS AS NEEDED
Status: DISCONTINUED | OUTPATIENT
Start: 2018-09-21 | End: 2018-09-21 | Stop reason: SURG

## 2018-09-21 RX ORDER — ACETAMINOPHEN 325 MG/1
650 TABLET ORAL EVERY 6 HOURS PRN
Status: DISCONTINUED | OUTPATIENT
Start: 2018-09-21 | End: 2018-09-24 | Stop reason: HOSPADM

## 2018-09-21 RX ORDER — ATORVASTATIN CALCIUM 40 MG/1
80 TABLET, FILM COATED ORAL NIGHTLY
Status: DISCONTINUED | OUTPATIENT
Start: 2018-09-21 | End: 2018-09-24 | Stop reason: HOSPADM

## 2018-09-21 RX ORDER — PROMETHAZINE HYDROCHLORIDE 25 MG/1
25 TABLET ORAL ONCE AS NEEDED
Status: DISCONTINUED | OUTPATIENT
Start: 2018-09-21 | End: 2018-09-21 | Stop reason: HOSPADM

## 2018-09-21 RX ADMIN — INSULIN LISPRO 5 UNITS: 100 INJECTION, SOLUTION INTRAVENOUS; SUBCUTANEOUS at 21:21

## 2018-09-21 RX ADMIN — CEFUROXIME 1.5 G: 1.5 INJECTION, POWDER, FOR SOLUTION INTRAVENOUS at 16:49

## 2018-09-21 RX ADMIN — SODIUM CHLORIDE, POTASSIUM CHLORIDE, SODIUM LACTATE AND CALCIUM CHLORIDE 100 ML/HR: 600; 310; 30; 20 INJECTION, SOLUTION INTRAVENOUS at 16:48

## 2018-09-21 RX ADMIN — ACETAMINOPHEN 650 MG: 325 TABLET ORAL at 16:06

## 2018-09-21 RX ADMIN — SODIUM CHLORIDE, POTASSIUM CHLORIDE, SODIUM LACTATE AND CALCIUM CHLORIDE 9 ML/HR: 600; 310; 30; 20 INJECTION, SOLUTION INTRAVENOUS at 08:37

## 2018-09-21 RX ADMIN — ONDANSETRON 4 MG: 2 INJECTION INTRAMUSCULAR; INTRAVENOUS at 11:25

## 2018-09-21 RX ADMIN — CEFUROXIME 1.5 G: 1.5 INJECTION, POWDER, FOR SOLUTION INTRAVENOUS at 09:47

## 2018-09-21 RX ADMIN — ATORVASTATIN CALCIUM 80 MG: 40 TABLET, FILM COATED ORAL at 20:10

## 2018-09-21 RX ADMIN — LIDOCAINE HYDROCHLORIDE 0.5 ML: 10 INJECTION, SOLUTION EPIDURAL; INFILTRATION; INTRACAUDAL; PERINEURAL at 08:37

## 2018-09-21 RX ADMIN — ACETAMINOPHEN 650 MG: 325 TABLET ORAL at 21:40

## 2018-09-21 RX ADMIN — FENTANYL CITRATE 50 MCG: 50 INJECTION, SOLUTION INTRAMUSCULAR; INTRAVENOUS at 11:24

## 2018-09-21 RX ADMIN — EPHEDRINE SULFATE 10 MG: 50 INJECTION INTRAMUSCULAR; INTRAVENOUS; SUBCUTANEOUS at 10:41

## 2018-09-21 RX ADMIN — SODIUM CHLORIDE, POTASSIUM CHLORIDE, SODIUM LACTATE AND CALCIUM CHLORIDE: 600; 310; 30; 20 INJECTION, SOLUTION INTRAVENOUS at 10:35

## 2018-09-21 RX ADMIN — LIDOCAINE HYDROCHLORIDE 50 MG: 10 INJECTION, SOLUTION EPIDURAL; INFILTRATION; INTRACAUDAL; PERINEURAL at 09:52

## 2018-09-21 RX ADMIN — INSULIN LISPRO 4 UNITS: 100 INJECTION, SOLUTION INTRAVENOUS; SUBCUTANEOUS at 12:24

## 2018-09-21 RX ADMIN — FENTANYL CITRATE 50 MCG: 50 INJECTION, SOLUTION INTRAMUSCULAR; INTRAVENOUS at 09:52

## 2018-09-21 RX ADMIN — EPHEDRINE SULFATE 10 MG: 50 INJECTION INTRAMUSCULAR; INTRAVENOUS; SUBCUTANEOUS at 10:04

## 2018-09-21 RX ADMIN — DEXAMETHASONE SODIUM PHOSPHATE 8 MG: 4 INJECTION, SOLUTION INTRAMUSCULAR; INTRAVENOUS at 09:56

## 2018-09-21 RX ADMIN — EPHEDRINE SULFATE 10 MG: 50 INJECTION INTRAMUSCULAR; INTRAVENOUS; SUBCUTANEOUS at 09:56

## 2018-09-21 RX ADMIN — MORPHINE SULFATE 2 MG: 4 INJECTION INTRAVENOUS at 18:23

## 2018-09-21 RX ADMIN — SODIUM BICARBONATE 650 MG TABLET 650 MG: at 20:10

## 2018-09-21 RX ADMIN — PROPOFOL 150 MG: 10 INJECTION, EMULSION INTRAVENOUS at 09:52

## 2018-09-21 RX ADMIN — FENTANYL CITRATE 50 MCG: 50 INJECTION, SOLUTION INTRAMUSCULAR; INTRAVENOUS at 10:04

## 2018-09-21 RX ADMIN — INSULIN LISPRO 2 UNITS: 100 INJECTION, SOLUTION INTRAVENOUS; SUBCUTANEOUS at 16:48

## 2018-09-21 RX ADMIN — HEPARIN SODIUM 5000 UNITS: 1000 INJECTION, SOLUTION INTRAVENOUS; SUBCUTANEOUS at 10:21

## 2018-09-21 RX ADMIN — PROTAMINE SULFATE 50 MG: 10 INJECTION, SOLUTION INTRAVENOUS at 11:02

## 2018-09-21 RX ADMIN — ALBUTEROL SULFATE 2.5 MG: 2.5 SOLUTION RESPIRATORY (INHALATION) at 21:34

## 2018-09-21 RX ADMIN — NICARDIPINE HYDROCHLORIDE 10 MG/HR: 0.1 INJECTION, SOLUTION INTRAVENOUS at 12:05

## 2018-09-21 RX ADMIN — PANTOPRAZOLE SODIUM 40 MG: 40 TABLET, DELAYED RELEASE ORAL at 16:50

## 2018-09-21 RX ADMIN — NICARDIPINE HYDROCHLORIDE 5 MG/HR: 0.1 INJECTION, SOLUTION INTRAVENOUS at 21:40

## 2018-09-21 RX ADMIN — CARVEDILOL 25 MG: 12.5 TABLET, FILM COATED ORAL at 16:49

## 2018-09-21 NOTE — ANESTHESIA PREPROCEDURE EVALUATION
Anesthesia Evaluation                  Airway   Mallampati: I  TM distance: >3 FB  Neck ROM: full  No difficulty expected  Dental - normal exam   (+) upper dentures and lower dentures    Pulmonary - normal exam   (+) a smoker Current, lung cancer, COPD,   Cardiovascular - normal exam    (+) hypertension, past MI , CAD, cardiac stents CHF, PVD, hyperlipidemia,       Neuro/Psych  (+) tremors, psychiatric history Anxiety and Depression,     GI/Hepatic/Renal/Endo    (+)  GERD, PUD, GI bleeding, diabetes mellitus,     Musculoskeletal     Abdominal  - normal exam    Bowel sounds: normal.   Substance History      OB/GYN          Other   (+) arthritis   history of cancer (NONSMALL CELL OF LUNG)                  Anesthesia Plan    ASA 3     general     intravenous induction   Anesthetic plan, all risks, benefits, and alternatives have been provided, discussed and informed consent has been obtained with: patient.    Plan discussed with CRNA.

## 2018-09-21 NOTE — ANESTHESIA PROCEDURE NOTES
Airway  Urgency: elective    Airway not difficult    General Information and Staff    Patient location during procedure: OR    Indications and Patient Condition  Indications for airway management: airway protection    Preoxygenated: yes  Mask difficulty assessment: 1 - vent by mask    Final Airway Details  Final airway type: supraglottic airway      Successful airway: classic  Size 3    Number of attempts at approach: 1    Additional Comments  LMA placed without difficulty, ventilation with assist, equal breath sounds and symmetric chest rise and fall

## 2018-09-21 NOTE — ANESTHESIA POSTPROCEDURE EVALUATION
Patient: Vivi Son    Procedure Summary     Date:  09/21/18 Room / Location:   [facility]    Anesthesia Start:  0947 Anesthesia Stop:      Procedures:       FEMORAL FEMORAL BYPASS RIGHT (Right Groin)      FEMORAL ENDARTERECTOMY (Right Neck) Diagnosis:       PAD (peripheral artery disease) (CMS/HCC)      (PAD (peripheral artery disease) (CMS/HCC) [I73.9])    Surgeon:  Triston lFoyd MD Provider:  Anjel Carballo MD    Anesthesia Type:  general ASA Status:  3          Anesthesia Type: general  Last vitals  BP   153/78 (09/21/18 1205)   Temp   97.4 °F (36.3 °C) (09/21/18 1205)   Pulse   75 (09/21/18 1205)   Resp   16 (09/21/18 1205)     SpO2   100 % (09/21/18 1205)     Post Anesthesia Care and Evaluation    Patient location during evaluation: PACU  Patient participation: complete - patient participated  Level of consciousness: awake and alert  Pain score: 0  Pain management: adequate  Airway patency: patent  Anesthetic complications: No anesthetic complications  PONV Status: none  Cardiovascular status: hemodynamically stable and acceptable  Respiratory status: nonlabored ventilation, acceptable and nasal cannula  Hydration status: acceptable

## 2018-09-22 LAB
GLUCOSE BLDC GLUCOMTR-MCNC: 323 MG/DL (ref 70–130)
GLUCOSE BLDC GLUCOMTR-MCNC: 328 MG/DL (ref 70–130)
GLUCOSE BLDC GLUCOMTR-MCNC: 413 MG/DL (ref 70–130)
GLUCOSE BLDC GLUCOMTR-MCNC: 414 MG/DL (ref 70–130)

## 2018-09-22 PROCEDURE — 94760 N-INVAS EAR/PLS OXIMETRY 1: CPT

## 2018-09-22 PROCEDURE — 82962 GLUCOSE BLOOD TEST: CPT

## 2018-09-22 PROCEDURE — 25010000002 CEFUROXIME: Performed by: PHYSICIAN ASSISTANT

## 2018-09-22 PROCEDURE — 94640 AIRWAY INHALATION TREATMENT: CPT

## 2018-09-22 PROCEDURE — 94799 UNLISTED PULMONARY SVC/PX: CPT

## 2018-09-22 PROCEDURE — 63710000001 INSULIN DETEMIR PER 5 UNITS: Performed by: INTERNAL MEDICINE

## 2018-09-22 PROCEDURE — 63710000001 INSULIN LISPRO (HUMAN) PER 5 UNITS: Performed by: NURSE PRACTITIONER

## 2018-09-22 PROCEDURE — 25010000002 MORPHINE PER 10 MG: Performed by: THORACIC SURGERY (CARDIOTHORACIC VASCULAR SURGERY)

## 2018-09-22 PROCEDURE — 99233 SBSQ HOSP IP/OBS HIGH 50: CPT | Performed by: INTERNAL MEDICINE

## 2018-09-22 RX ORDER — IPRATROPIUM BROMIDE AND ALBUTEROL SULFATE 2.5; .5 MG/3ML; MG/3ML
3 SOLUTION RESPIRATORY (INHALATION)
Status: DISCONTINUED | OUTPATIENT
Start: 2018-09-22 | End: 2018-09-24 | Stop reason: HOSPADM

## 2018-09-22 RX ORDER — NICOTINE 21 MG/24HR
1 PATCH, TRANSDERMAL 24 HOURS TRANSDERMAL
Status: DISCONTINUED | OUTPATIENT
Start: 2018-09-22 | End: 2018-09-24 | Stop reason: HOSPADM

## 2018-09-22 RX ORDER — GABAPENTIN 300 MG/1
300 CAPSULE ORAL EVERY 8 HOURS SCHEDULED
Status: DISCONTINUED | OUTPATIENT
Start: 2018-09-22 | End: 2018-09-24 | Stop reason: HOSPADM

## 2018-09-22 RX ORDER — BUDESONIDE AND FORMOTEROL FUMARATE DIHYDRATE 160; 4.5 UG/1; UG/1
2 AEROSOL RESPIRATORY (INHALATION)
Status: DISCONTINUED | OUTPATIENT
Start: 2018-09-22 | End: 2018-09-24 | Stop reason: HOSPADM

## 2018-09-22 RX ORDER — ENALAPRILAT 2.5 MG/2ML
1.25 INJECTION INTRAVENOUS EVERY 6 HOURS PRN
Status: DISCONTINUED | OUTPATIENT
Start: 2018-09-22 | End: 2018-09-24 | Stop reason: HOSPADM

## 2018-09-22 RX ADMIN — SODIUM CHLORIDE 5 MG/HR: 9 INJECTION, SOLUTION INTRAVENOUS at 02:18

## 2018-09-22 RX ADMIN — ACETAMINOPHEN 650 MG: 325 TABLET ORAL at 12:54

## 2018-09-22 RX ADMIN — GABAPENTIN 300 MG: 300 CAPSULE ORAL at 11:51

## 2018-09-22 RX ADMIN — ATORVASTATIN CALCIUM 80 MG: 40 TABLET, FILM COATED ORAL at 20:38

## 2018-09-22 RX ADMIN — SODIUM CHLORIDE 35 ML/HR: 4.5 INJECTION, SOLUTION INTRAVENOUS at 17:17

## 2018-09-22 RX ADMIN — INSULIN LISPRO 5 UNITS: 100 INJECTION, SOLUTION INTRAVENOUS; SUBCUTANEOUS at 17:11

## 2018-09-22 RX ADMIN — PANTOPRAZOLE SODIUM 40 MG: 40 TABLET, DELAYED RELEASE ORAL at 17:12

## 2018-09-22 RX ADMIN — SODIUM CHLORIDE, POTASSIUM CHLORIDE, SODIUM LACTATE AND CALCIUM CHLORIDE 100 ML/HR: 600; 310; 30; 20 INJECTION, SOLUTION INTRAVENOUS at 02:03

## 2018-09-22 RX ADMIN — BUDESONIDE AND FORMOTEROL FUMARATE DIHYDRATE 2 PUFF: 160; 4.5 AEROSOL RESPIRATORY (INHALATION) at 10:18

## 2018-09-22 RX ADMIN — MORPHINE SULFATE 2 MG: 4 INJECTION INTRAVENOUS at 02:02

## 2018-09-22 RX ADMIN — NICOTINE 1 PATCH: 21 PATCH, EXTENDED RELEASE TRANSDERMAL at 14:19

## 2018-09-22 RX ADMIN — SODIUM BICARBONATE 650 MG TABLET 650 MG: at 08:18

## 2018-09-22 RX ADMIN — CEFUROXIME 1.5 G: 1.5 INJECTION, POWDER, FOR SOLUTION INTRAVENOUS at 02:03

## 2018-09-22 RX ADMIN — INSULIN DETEMIR 15 UNITS: 100 INJECTION, SOLUTION SUBCUTANEOUS at 20:42

## 2018-09-22 RX ADMIN — ASPIRIN 81 MG 81 MG: 81 TABLET ORAL at 08:18

## 2018-09-22 RX ADMIN — INSULIN LISPRO 7 UNITS: 100 INJECTION, SOLUTION INTRAVENOUS; SUBCUTANEOUS at 11:45

## 2018-09-22 RX ADMIN — INSULIN LISPRO 10 UNITS: 100 INJECTION, SOLUTION INTRAVENOUS; SUBCUTANEOUS at 11:46

## 2018-09-22 RX ADMIN — LEVOTHYROXINE SODIUM 25 MCG: 25 TABLET ORAL at 06:12

## 2018-09-22 RX ADMIN — INSULIN LISPRO 5 UNITS: 100 INJECTION, SOLUTION INTRAVENOUS; SUBCUTANEOUS at 07:49

## 2018-09-22 RX ADMIN — MORPHINE SULFATE 2 MG: 4 INJECTION INTRAVENOUS at 09:45

## 2018-09-22 RX ADMIN — GABAPENTIN 300 MG: 300 CAPSULE ORAL at 21:16

## 2018-09-22 RX ADMIN — IPRATROPIUM BROMIDE AND ALBUTEROL SULFATE 3 ML: 2.5; .5 SOLUTION RESPIRATORY (INHALATION) at 19:37

## 2018-09-22 RX ADMIN — BUDESONIDE AND FORMOTEROL FUMARATE DIHYDRATE 2 PUFF: 160; 4.5 AEROSOL RESPIRATORY (INHALATION) at 19:37

## 2018-09-22 RX ADMIN — CLOPIDOGREL BISULFATE 75 MG: 75 TABLET ORAL at 08:18

## 2018-09-22 RX ADMIN — AMLODIPINE BESYLATE 5 MG: 5 TABLET ORAL at 08:18

## 2018-09-22 RX ADMIN — CARVEDILOL 25 MG: 12.5 TABLET, FILM COATED ORAL at 07:49

## 2018-09-22 RX ADMIN — PANTOPRAZOLE SODIUM 40 MG: 40 TABLET, DELAYED RELEASE ORAL at 06:12

## 2018-09-22 RX ADMIN — SODIUM CHLORIDE 7.5 MG/HR: 9 INJECTION, SOLUTION INTRAVENOUS at 09:54

## 2018-09-22 RX ADMIN — CARVEDILOL 25 MG: 12.5 TABLET, FILM COATED ORAL at 17:12

## 2018-09-22 RX ADMIN — IPRATROPIUM BROMIDE AND ALBUTEROL SULFATE 3 ML: 2.5; .5 SOLUTION RESPIRATORY (INHALATION) at 13:18

## 2018-09-22 RX ADMIN — ACETAMINOPHEN 650 MG: 325 TABLET ORAL at 20:47

## 2018-09-22 RX ADMIN — IPRATROPIUM BROMIDE AND ALBUTEROL SULFATE 3 ML: 2.5; .5 SOLUTION RESPIRATORY (INHALATION) at 06:39

## 2018-09-22 RX ADMIN — SODIUM BICARBONATE 650 MG TABLET 650 MG: at 20:38

## 2018-09-22 RX ADMIN — INSULIN LISPRO 7 UNITS: 100 INJECTION, SOLUTION INTRAVENOUS; SUBCUTANEOUS at 20:49

## 2018-09-22 RX ADMIN — IPRATROPIUM BROMIDE AND ALBUTEROL SULFATE 3 ML: 2.5; .5 SOLUTION RESPIRATORY (INHALATION) at 17:20

## 2018-09-23 LAB
ALBUMIN SERPL-MCNC: 3.65 G/DL (ref 3.2–4.8)
ALBUMIN/GLOB SERPL: 2 G/DL (ref 1.5–2.5)
ALP SERPL-CCNC: 73 U/L (ref 25–100)
ALT SERPL W P-5'-P-CCNC: 17 U/L (ref 7–40)
ANION GAP SERPL CALCULATED.3IONS-SCNC: 3 MMOL/L (ref 3–11)
AST SERPL-CCNC: 12 U/L (ref 0–33)
BILIRUB SERPL-MCNC: 0.3 MG/DL (ref 0.3–1.2)
BUN BLD-MCNC: 28 MG/DL (ref 9–23)
BUN/CREAT SERPL: 23.5 (ref 7–25)
CA-I SERPL ISE-MCNC: 1.14 MMOL/L (ref 1.12–1.32)
CALCIUM SPEC-SCNC: 8.5 MG/DL (ref 8.7–10.4)
CHLORIDE SERPL-SCNC: 115 MMOL/L (ref 99–109)
CO2 SERPL-SCNC: 23 MMOL/L (ref 20–31)
CREAT BLD-MCNC: 1.19 MG/DL (ref 0.6–1.3)
DEPRECATED RDW RBC AUTO: 50.4 FL (ref 37–54)
ERYTHROCYTE [DISTWIDTH] IN BLOOD BY AUTOMATED COUNT: 13.7 % (ref 11.3–14.5)
GFR SERPL CREATININE-BSD FRML MDRD: 45 ML/MIN/1.73
GLOBULIN UR ELPH-MCNC: 1.9 GM/DL
GLUCOSE BLD-MCNC: 195 MG/DL (ref 70–100)
GLUCOSE BLDC GLUCOMTR-MCNC: 215 MG/DL (ref 70–130)
GLUCOSE BLDC GLUCOMTR-MCNC: 311 MG/DL (ref 70–130)
GLUCOSE BLDC GLUCOMTR-MCNC: 324 MG/DL (ref 70–130)
GLUCOSE BLDC GLUCOMTR-MCNC: 365 MG/DL (ref 70–130)
HCT VFR BLD AUTO: 27.6 % (ref 34.5–44)
HGB BLD-MCNC: 8.9 G/DL (ref 11.5–15.5)
MAGNESIUM SERPL-MCNC: 1.7 MG/DL (ref 1.3–2.7)
MCH RBC QN AUTO: 33.2 PG (ref 27–31)
MCHC RBC AUTO-ENTMCNC: 32.2 G/DL (ref 32–36)
MCV RBC AUTO: 103 FL (ref 80–99)
PHOSPHATE SERPL-MCNC: 3.3 MG/DL (ref 2.4–5.1)
PLATELET # BLD AUTO: 208 10*3/MM3 (ref 150–450)
PMV BLD AUTO: 9.5 FL (ref 6–12)
POTASSIUM BLD-SCNC: 4.5 MMOL/L (ref 3.5–5.5)
PROT SERPL-MCNC: 5.5 G/DL (ref 5.7–8.2)
RBC # BLD AUTO: 2.68 10*6/MM3 (ref 3.89–5.14)
SODIUM BLD-SCNC: 141 MMOL/L (ref 132–146)
WBC NRBC COR # BLD: 14.55 10*3/MM3 (ref 3.5–10.8)

## 2018-09-23 PROCEDURE — 82962 GLUCOSE BLOOD TEST: CPT

## 2018-09-23 PROCEDURE — 82330 ASSAY OF CALCIUM: CPT | Performed by: INTERNAL MEDICINE

## 2018-09-23 PROCEDURE — 99232 SBSQ HOSP IP/OBS MODERATE 35: CPT | Performed by: INTERNAL MEDICINE

## 2018-09-23 PROCEDURE — 80053 COMPREHEN METABOLIC PANEL: CPT | Performed by: INTERNAL MEDICINE

## 2018-09-23 PROCEDURE — 84100 ASSAY OF PHOSPHORUS: CPT | Performed by: INTERNAL MEDICINE

## 2018-09-23 PROCEDURE — 94640 AIRWAY INHALATION TREATMENT: CPT

## 2018-09-23 PROCEDURE — 85027 COMPLETE CBC AUTOMATED: CPT | Performed by: INTERNAL MEDICINE

## 2018-09-23 PROCEDURE — 94799 UNLISTED PULMONARY SVC/PX: CPT

## 2018-09-23 PROCEDURE — 83735 ASSAY OF MAGNESIUM: CPT | Performed by: INTERNAL MEDICINE

## 2018-09-23 PROCEDURE — 63710000001 INSULIN DETEMIR PER 5 UNITS: Performed by: INTERNAL MEDICINE

## 2018-09-23 PROCEDURE — 99024 POSTOP FOLLOW-UP VISIT: CPT | Performed by: PHYSICIAN ASSISTANT

## 2018-09-23 RX ADMIN — CLOPIDOGREL BISULFATE 75 MG: 75 TABLET ORAL at 08:30

## 2018-09-23 RX ADMIN — AMLODIPINE BESYLATE 5 MG: 5 TABLET ORAL at 08:30

## 2018-09-23 RX ADMIN — GABAPENTIN 300 MG: 300 CAPSULE ORAL at 06:22

## 2018-09-23 RX ADMIN — IPRATROPIUM BROMIDE AND ALBUTEROL SULFATE 3 ML: 2.5; .5 SOLUTION RESPIRATORY (INHALATION) at 07:08

## 2018-09-23 RX ADMIN — GABAPENTIN 300 MG: 300 CAPSULE ORAL at 14:01

## 2018-09-23 RX ADMIN — IPRATROPIUM BROMIDE AND ALBUTEROL SULFATE 3 ML: 2.5; .5 SOLUTION RESPIRATORY (INHALATION) at 15:46

## 2018-09-23 RX ADMIN — ACETAMINOPHEN 650 MG: 325 TABLET ORAL at 16:48

## 2018-09-23 RX ADMIN — INSULIN LISPRO 3 UNITS: 100 INJECTION, SOLUTION INTRAVENOUS; SUBCUTANEOUS at 08:30

## 2018-09-23 RX ADMIN — SODIUM BICARBONATE 650 MG TABLET 650 MG: at 08:30

## 2018-09-23 RX ADMIN — LEVOTHYROXINE SODIUM 25 MCG: 25 TABLET ORAL at 06:22

## 2018-09-23 RX ADMIN — ENALAPRILAT 1.25 MG: 1.25 INJECTION INTRAVENOUS at 22:13

## 2018-09-23 RX ADMIN — INSULIN LISPRO 5 UNITS: 100 INJECTION, SOLUTION INTRAVENOUS; SUBCUTANEOUS at 22:31

## 2018-09-23 RX ADMIN — INSULIN LISPRO 5 UNITS: 100 INJECTION, SOLUTION INTRAVENOUS; SUBCUTANEOUS at 16:46

## 2018-09-23 RX ADMIN — NICOTINE 1 PATCH: 21 PATCH, EXTENDED RELEASE TRANSDERMAL at 08:30

## 2018-09-23 RX ADMIN — IPRATROPIUM BROMIDE AND ALBUTEROL SULFATE 3 ML: 2.5; .5 SOLUTION RESPIRATORY (INHALATION) at 21:22

## 2018-09-23 RX ADMIN — PANTOPRAZOLE SODIUM 40 MG: 40 TABLET, DELAYED RELEASE ORAL at 06:22

## 2018-09-23 RX ADMIN — INSULIN DETEMIR 25 UNITS: 100 INJECTION, SOLUTION SUBCUTANEOUS at 22:32

## 2018-09-23 RX ADMIN — INSULIN LISPRO 6 UNITS: 100 INJECTION, SOLUTION INTRAVENOUS; SUBCUTANEOUS at 11:26

## 2018-09-23 RX ADMIN — ACETAMINOPHEN 650 MG: 325 TABLET ORAL at 08:43

## 2018-09-23 RX ADMIN — IPRATROPIUM BROMIDE AND ALBUTEROL SULFATE 3 ML: 2.5; .5 SOLUTION RESPIRATORY (INHALATION) at 11:59

## 2018-09-23 RX ADMIN — GABAPENTIN 300 MG: 300 CAPSULE ORAL at 22:12

## 2018-09-23 RX ADMIN — CARVEDILOL 25 MG: 12.5 TABLET, FILM COATED ORAL at 08:30

## 2018-09-23 RX ADMIN — SODIUM BICARBONATE 650 MG TABLET 650 MG: at 22:09

## 2018-09-23 RX ADMIN — ACETAMINOPHEN 650 MG: 325 TABLET ORAL at 22:11

## 2018-09-23 RX ADMIN — PANTOPRAZOLE SODIUM 40 MG: 40 TABLET, DELAYED RELEASE ORAL at 16:46

## 2018-09-23 RX ADMIN — ATORVASTATIN CALCIUM 80 MG: 40 TABLET, FILM COATED ORAL at 22:11

## 2018-09-23 RX ADMIN — ASPIRIN 81 MG 81 MG: 81 TABLET ORAL at 08:30

## 2018-09-23 RX ADMIN — CARVEDILOL 25 MG: 12.5 TABLET, FILM COATED ORAL at 16:46

## 2018-09-23 RX ADMIN — BUDESONIDE AND FORMOTEROL FUMARATE DIHYDRATE 2 PUFF: 160; 4.5 AEROSOL RESPIRATORY (INHALATION) at 21:22

## 2018-09-23 RX ADMIN — ENALAPRILAT 1.25 MG: 1.25 INJECTION INTRAVENOUS at 07:14

## 2018-09-23 RX ADMIN — BUDESONIDE AND FORMOTEROL FUMARATE DIHYDRATE 2 PUFF: 160; 4.5 AEROSOL RESPIRATORY (INHALATION) at 07:08

## 2018-09-24 VITALS
HEART RATE: 80 BPM | DIASTOLIC BLOOD PRESSURE: 58 MMHG | HEIGHT: 61 IN | BODY MASS INDEX: 30.76 KG/M2 | WEIGHT: 162.92 LBS | OXYGEN SATURATION: 94 % | SYSTOLIC BLOOD PRESSURE: 141 MMHG | TEMPERATURE: 97.9 F | RESPIRATION RATE: 18 BRPM

## 2018-09-24 LAB
CYTO UR: NORMAL
GLUCOSE BLDC GLUCOMTR-MCNC: 203 MG/DL (ref 70–130)
GLUCOSE BLDC GLUCOMTR-MCNC: 221 MG/DL (ref 70–130)
LAB AP CASE REPORT: NORMAL
LAB AP CLINICAL INFORMATION: NORMAL
PATH REPORT.FINAL DX SPEC: NORMAL
PATH REPORT.GROSS SPEC: NORMAL

## 2018-09-24 PROCEDURE — 99024 POSTOP FOLLOW-UP VISIT: CPT | Performed by: PHYSICIAN ASSISTANT

## 2018-09-24 PROCEDURE — 94640 AIRWAY INHALATION TREATMENT: CPT

## 2018-09-24 PROCEDURE — 94760 N-INVAS EAR/PLS OXIMETRY 1: CPT

## 2018-09-24 PROCEDURE — 82962 GLUCOSE BLOOD TEST: CPT

## 2018-09-24 RX ORDER — NICOTINE 21 MG/24HR
1 PATCH, TRANSDERMAL 24 HOURS TRANSDERMAL
Qty: 28 EACH | Refills: 0 | Status: SHIPPED | OUTPATIENT
Start: 2018-09-24 | End: 2021-01-29

## 2018-09-24 RX ADMIN — ACETAMINOPHEN 650 MG: 325 TABLET ORAL at 04:18

## 2018-09-24 RX ADMIN — INSULIN LISPRO 3 UNITS: 100 INJECTION, SOLUTION INTRAVENOUS; SUBCUTANEOUS at 09:40

## 2018-09-24 RX ADMIN — IPRATROPIUM BROMIDE AND ALBUTEROL SULFATE 3 ML: 2.5; .5 SOLUTION RESPIRATORY (INHALATION) at 07:13

## 2018-09-24 RX ADMIN — SODIUM BICARBONATE 650 MG TABLET 650 MG: at 09:40

## 2018-09-24 RX ADMIN — GABAPENTIN 300 MG: 300 CAPSULE ORAL at 04:19

## 2018-09-24 RX ADMIN — AMLODIPINE BESYLATE 5 MG: 5 TABLET ORAL at 09:40

## 2018-09-24 RX ADMIN — CARVEDILOL 25 MG: 12.5 TABLET, FILM COATED ORAL at 09:40

## 2018-09-24 RX ADMIN — ASPIRIN 81 MG 81 MG: 81 TABLET ORAL at 09:41

## 2018-09-24 RX ADMIN — CLOPIDOGREL BISULFATE 75 MG: 75 TABLET ORAL at 09:40

## 2018-09-24 RX ADMIN — NICOTINE 1 PATCH: 21 PATCH, EXTENDED RELEASE TRANSDERMAL at 09:41

## 2018-09-24 RX ADMIN — LEVOTHYROXINE SODIUM 25 MCG: 25 TABLET ORAL at 04:19

## 2018-09-24 RX ADMIN — BUDESONIDE AND FORMOTEROL FUMARATE DIHYDRATE 2 PUFF: 160; 4.5 AEROSOL RESPIRATORY (INHALATION) at 07:14

## 2018-09-24 RX ADMIN — PANTOPRAZOLE SODIUM 40 MG: 40 TABLET, DELAYED RELEASE ORAL at 04:19

## 2018-09-24 RX ADMIN — ACETAMINOPHEN 650 MG: 325 TABLET ORAL at 09:43

## 2018-09-25 ENCOUNTER — READMISSION MANAGEMENT (OUTPATIENT)
Dept: CALL CENTER | Facility: HOSPITAL | Age: 73
End: 2018-09-25

## 2018-09-25 LAB
ABO + RH BLD: NORMAL
ABO + RH BLD: NORMAL
BH BB BLOOD EXPIRATION DATE: NORMAL
BH BB BLOOD EXPIRATION DATE: NORMAL
BH BB BLOOD TYPE BARCODE: 7300
BH BB BLOOD TYPE BARCODE: 7300
BH BB DISPENSE STATUS: NORMAL
BH BB DISPENSE STATUS: NORMAL
BH BB PRODUCT CODE: NORMAL
BH BB PRODUCT CODE: NORMAL
BH BB UNIT NUMBER: NORMAL
BH BB UNIT NUMBER: NORMAL
UNIT  ABO: NORMAL
UNIT  ABO: NORMAL
UNIT  RH: NORMAL
UNIT  RH: NORMAL

## 2018-09-25 NOTE — OUTREACH NOTE
Prep Survey      Responses   Facility patient discharged from?  [place]   Is patient eligible?  Yes   Discharge diagnosis  PAD (S/P Right Fem-Fem Bypass)   Does the patient have one of the following disease processes/diagnoses(primary or secondary)?  General Surgery   Prep survey completed?  Yes          Eliza Rivera, RN

## 2018-09-26 ENCOUNTER — READMISSION MANAGEMENT (OUTPATIENT)
Dept: CALL CENTER | Facility: HOSPITAL | Age: 73
End: 2018-09-26

## 2018-09-26 NOTE — OUTREACH NOTE
General Surgery Week 1 Survey      Responses   Facility patient discharged from?  [place]   Does the patient have one of the following disease processes/diagnoses(primary or secondary)?  General Surgery   Is there a successful TCM telephone encounter documented?  No   Week 1 attempt successful?  Yes   Call start time  1603   Call end time  1610   Discharge diagnosis  PAD (S/P Right Fem-Fem Bypass)   Is patient permission given to speak with other caregiver?  No   Meds reviewed with patient/caregiver?  Yes   Is the patient having any side effects they believe may be caused by any medication additions or changes?  No   Does the patient have all medications related to this admission filled (includes all antibiotics, pain medications, etc.)  Yes   Is the patient taking all medications as directed (includes completed medication regime)?  Yes   Does the patient have a follow up appointment scheduled with their surgeon?  Yes   Has the patient kept scheduled appointments due by today?  Yes   Has home health visited the patient within 72 hours of discharge?  N/A   Psychosocial issues?  No   Did the patient receive a copy of their discharge instructions?  Yes   Nursing interventions  Reviewed instructions with patient   What is the patient's perception of their health status since discharge?  Improving   Nursing interventions  Nurse provided patient education   Is the patient /caregiver able to teach back basic post-op care?  Practice 'cough and deep breath', No tub bath, swimming, or hot tub until instructed by MD, Keep incision areas clean,dry and protected, Do not remove steri-strips, Lifting as instructed by MD in discharge instructions   Is the patient/caregiver able to teach back signs and symptoms of incisional infection?  Increased redness, swelling or pain at the incisonal site, Increased drainage or bleeding, Incisional warmth, Pus or odor from incision, Fever   Is the patient/caregiver able to teach back steps to  recovery at home?  Set small, achievable goals for return to baseline health, Rest and rebuild strength, gradually increase activity, Make a list of questions for surgeon's appointment   If the patient is a current smoker, are they able to teach back resources for cessation?  Smoking cessation medications   Is the patient/caregiver able to teach back the hierarchy of who to call/visit for symptoms/problems? PCP, Specialist, Home health nurse, Urgent Care, ED, 911  Yes   Week 1 call completed?  Yes          Pauline Alves RN

## 2018-10-01 NOTE — DISCHARGE SUMMARY
CTS Discharge Summary    Patient Care Team:  Tess Recio APRN as PCP - General (Family Medicine)  Rudi Vaughn MD as Cardiologist (Cardiology)  Consults:   Consults     No orders found from 8/23/2018 to 9/22/2018.          Date of Admission: 9/21/2018  6:37 AM  Date of Discharge:  9/24/2018    Discharge Diagnosis  Past Medical History:   Diagnosis Date   • Anemia    • Anxiety    • Arthritis    • Aspiration of food     pill   • Bleeding ulcer    • Blood transfusion abn reaction or complication, no procedure mishap    • CAD (coronary artery disease)    • CHF (congestive heart failure) (CMS/[facility])    • Chronic kidney disease    • COPD (chronic obstructive pulmonary disease) (CMS/[facility])    • Depression    • Diabetes mellitus type II, controlled (CMS/[facility])     DX 1985, FSBS 3-4 X DAY   • GERD (gastroesophageal reflux disease)    • History of bronchitis    • History of pneumonia    • History of transfusion    • HTN (hypertension)    • Hyperlipidemia    • Hypothyroidism    • MI (myocardial infarction)    • Non-small cell carcinoma of right lung (CMS/[facility])    • PUD (peptic ulcer disease)    • PVD (peripheral vascular disease) (CMS/[facility])    • Tremors of nervous system    • Wears dentures    • Wears glasses      PAD (peripheral artery disease) (CMS/[facility]) [I73.9]  PAD (peripheral artery disease) (CMS/[facility]) [I73.9]     Procedures Performed  Procedure(s):  FEMORAL FEMORAL BYPASS RIGHT  FEMORAL ENDARTERECTOMY     History of Present Illness  Patient is a 73 y.o. female  who presents with a history of peripheral vascular disease presents with claudication of the left thigh, buttocks and calf in less than 50 ft. (>90% left iliac stenosis).      Hospital Course  Patient was taken to the operating room on 9/21/18 for right femoral to femoral bypass with a 8 mm PTFE graft and endarterectomy of the left profundus artery.  Patient was extubated in the operating room and transported to recovery in stable condition.  POD 1:  On and off  Cardene.  Feet warm and viable.  Awaiting telemetry.  POD 2:  Transferred to telemetry.  Ambulating well.  POD 3:  Patient met discharge criteria and was discharged to home      Discharge Medications     Discharge Medications      New Medications      Instructions Start Date   nicotine 21 MG/24HR patch  Commonly known as:  NICODERM CQ   1 patch, Transdermal, Every 24 Hours Scheduled         Continue These Medications      Instructions Start Date   acetaminophen 500 MG tablet  Commonly known as:  TYLENOL   1,000 mg, Oral, Every 6 Hours PRN      albuterol 108 (90 Base) MCG/ACT inhaler  Commonly known as:  PROVENTIL HFA;VENTOLIN HFA   2 puffs, Inhalation, Every 4 Hours PRN      albuterol (2.5 MG/3ML) 0.083% nebulizer solution  Commonly known as:  PROVENTIL   2.5 mg, Nebulization, Every 4 Hours PRN      amLODIPine 5 MG tablet  Commonly known as:  NORVASC   5 mg, Oral, Daily      aspirin 81 MG chewable tablet   81 mg, Oral, Daily      atorvastatin 80 MG tablet  Commonly known as:  LIPITOR   80 mg, Oral, Daily      carvedilol 25 MG tablet  Commonly known as:  COREG   25 mg, Oral, 2 Times Daily With Meals      chlorthalidone 25 MG tablet  Commonly known as:  HYGROTON   25 mg, Oral, Daily      clopidogrel 75 MG tablet  Commonly known as:  PLAVIX   75 mg, Oral, Daily      furosemide 20 MG tablet  Commonly known as:  LASIX   20 mg, Oral, Every Other Day      gabapentin 300 MG capsule  Commonly known as:  NEURONTIN   300 mg, Oral, 3 Times Daily      insulin NPH-insulin regular (70-30) 100 UNIT/ML injection  Commonly known as:  humuLIN 70/30,novoLIN 70/30   22 Units, Subcutaneous, 2 Times Daily With Meals, 22 units in AM and 21 units in PM      irbesartan 300 MG tablet  Commonly known as:  AVAPRO   300 mg, Oral, Nightly      Iron 325 (65 Fe) MG tablet   325 mg, Oral, 2 Times Daily      levothyroxine 25 MCG tablet  Commonly known as:  SYNTHROID, LEVOTHROID   25 mcg, Oral, Daily      pantoprazole 40 MG EC tablet  Commonly known  as:  PROTONIX   40 mg, Oral, 2 Times Daily      sodium bicarbonate 650 MG tablet   650 mg, Oral, 2 Times Daily      spironolactone 100 MG tablet  Commonly known as:  ALDACTONE   50 mg, Oral, Nightly      SUPER B COMPLEX/C capsule   1 tablet, Oral, Daily      tiotropium 18 MCG per inhalation capsule  Commonly known as:  SPIRIVA   1 capsule, Inhalation, Daily - RT      TRESIBA FLEXTOUCH SC   20 Units, Subcutaneous, Nightly      vitamin B-12 500 MCG tablet  Commonly known as:  CYANOCOBALAMIN   500 mcg, Oral, Daily      vitamin C 500 MG tablet  Commonly known as:  ASCORBIC ACID   500 mg, Oral, Daily      Vitamin D3 1000 units capsule   1,000 Units, Oral, Daily             Discharge Diet:   Diet Instructions     Diet: Consistent Carbohydrate, Cardiac       Discharge Diet:   Consistent Carbohydrate  Cardiac             Activity at Discharge:   Activity Instructions     Discharge Activity Restrictions       1) No driving for 2 weeks and no longer taking narcotics.   2) Do not lift / push / pull more than 15 lbs.          Follow-up Appointments  Future Appointments  Date Time Provider Department Center   10/25/2018 9:15 AM Triston Floyd MD [facility] None         Sendy Hutchison PA-C  10/01/18  8:08 AM

## 2018-10-03 ENCOUNTER — READMISSION MANAGEMENT (OUTPATIENT)
Dept: CALL CENTER | Facility: HOSPITAL | Age: 73
End: 2018-10-03

## 2018-10-03 NOTE — OUTREACH NOTE
General Surgery Week 2 Survey      Responses   Facility patient discharged from?  [place]   Does the patient have one of the following disease processes/diagnoses(primary or secondary)?  General Surgery   Week 2 attempt successful?  No   Unsuccessful attempts  Attempt 1          Noemy Mckeon RN

## 2018-10-08 ENCOUNTER — READMISSION MANAGEMENT (OUTPATIENT)
Dept: CALL CENTER | Facility: HOSPITAL | Age: 73
End: 2018-10-08

## 2018-10-08 NOTE — OUTREACH NOTE
"General Surgery Week 2 Survey      Responses   Facility patient discharged from?  [place]   Does the patient have one of the following disease processes/diagnoses(primary or secondary)?  General Surgery   Week 2 attempt successful?  Yes   Call start time  1438   Call end time  1442   Discharge diagnosis  PAD (S/P Right Fem-Fem Bypass)   Meds reviewed with patient/caregiver?  Yes   Is the patient taking all medications as directed (includes completed medication regime)?  Yes   Has the patient kept scheduled appointments due by today?  Yes   Comments  Has seen her PCP. Sees surgeon 10/25/18.    What is the patient's perception of their health status since discharge?  Improving   Week 2 call completed?  Yes   Wrap up additional comments  Pain rated 3 today. Incision still \"puffy\" but not red or hot. She continues to smoke, counseled her this could cause delayed wound healing.           Rand Moe RN  "

## 2018-11-08 ENCOUNTER — OFFICE VISIT (OUTPATIENT)
Dept: CARDIAC SURGERY | Facility: CLINIC | Age: 73
End: 2018-11-08

## 2018-11-08 VITALS
BODY MASS INDEX: 30.58 KG/M2 | HEART RATE: 79 BPM | SYSTOLIC BLOOD PRESSURE: 143 MMHG | DIASTOLIC BLOOD PRESSURE: 68 MMHG | WEIGHT: 162 LBS | HEIGHT: 61 IN

## 2018-11-08 DIAGNOSIS — I73.9 PAD (PERIPHERAL ARTERY DISEASE) (HCC): Primary | ICD-10-CM

## 2018-11-08 PROCEDURE — 99024 POSTOP FOLLOW-UP VISIT: CPT | Performed by: THORACIC SURGERY (CARDIOTHORACIC VASCULAR SURGERY)

## 2018-11-08 NOTE — PROGRESS NOTES
11/08/2018  Patient Information  Vivi CHAUNCEY Son                                                                                          [address] [place] [address] [address] [place] [address]   1945  'PCP/Referring Physician'  Tess Recio, APRN  [phone]  No ref. provider found    Chief Complaint   Patient presents with   • Post-op     Hospital follow up s/p fem-fem bypass 9/21/18.       History of Present Illness:  The patient is status post femoral to femoral bypass.  At this time, she is doing well.  Both incisions are healing well without any signs of infection. She is continuing antiplatelet therapy.  She is ambulating without any claudication symptoms.      Patient Active Problem List   Diagnosis   • PAD (S/P Right Fem-Fem Bypass)   • Hyperlipidemia   • HTN (hypertension)   • Chronic kidney disease   • Diabetes mellitus type II, controlled (CMS/[facility])   • COPD (chronic obstructive pulmonary disease) (CMS/[facility])   • CAD (coronary artery disease)   • GERD (gastroesophageal reflux disease)   • Hypothyroidism   • Tobacco abuse     Past Medical History:   Diagnosis Date   • Anemia    • Anxiety    • Arthritis    • Aspiration of food     pill   • Bleeding ulcer    • Blood transfusion abn reaction or complication, no procedure mishap    • CAD (coronary artery disease)    • CHF (congestive heart failure) (CMS/HCC)    • Chronic kidney disease    • COPD (chronic obstructive pulmonary disease) (CMS/HCC)    • Depression    • Diabetes mellitus type II, controlled (CMS/HCC)     DX 1985, FSBS 3-4 X DAY   • GERD (gastroesophageal reflux disease)    • History of bronchitis    • History of pneumonia    • History of transfusion    • HTN (hypertension)    • Hyperlipidemia    • Hypothyroidism    • MI (myocardial infarction) (CMS/HCC)    • Non-small cell carcinoma of right lung (CMS/HCC)    • PUD (peptic ulcer disease)    • PVD (peripheral vascular disease) (CMS/HCC)    • Tremors of nervous system    • Wears dentures    • Wears glasses       Past Surgical History:   Procedure Laterality Date   • AORTAGRAM N/A 9/4/2018    Procedure: AORTAGRAM WITH RUNOFFS RIGHT COMMON ILLIAC AND DISTAL ABDOMINAL AORTA STENT;  Surgeon: Triston Floyd MD;  Location:  [facility];  Service: Vascular   • APPENDECTOMY  1978   • CARDIAC CATHETERIZATION     • CARPAL TUNNEL RELEASE Right    • COLONOSCOPY     • CORONARY ANGIOPLASTY WITH STENT PLACEMENT      [facility] - unknown physician name    • EAR TUBES     • EAR TUBES Left    • ENDOSCOPY     • FEMORAL ENDARTERECTOMY Right 9/21/2018    Procedure: FEMORAL ENDARTERECTOMY;  Surgeon: Triston Floyd MD;  Location:  [facility];  Service: Vascular   • FEMORAL FEMORAL BYPASS Right 9/21/2018    Procedure: FEMORAL FEMORAL BYPASS RIGHT;  Surgeon: Triston Floyd MD;  Location:  [facility];  Service: Vascular   • LUMBAR SPINE SURGERY     • LUNG LOBECTOMY Right     RUL    • SACROCOCCYGEAL ULCER REMOVAL     • VOCAL CORD BIOPSY         Current Outpatient Prescriptions:   •  acetaminophen (TYLENOL) 500 MG tablet, Take 1,000 mg by mouth Every 6 (Six) Hours As Needed for Mild Pain ., Disp: , Rfl:   •  albuterol (PROVENTIL HFA;VENTOLIN HFA) 108 (90 Base) MCG/ACT inhaler, Inhale 2 puffs Every 4 (Four) Hours As Needed for Wheezing., Disp: , Rfl:   •  albuterol (PROVENTIL) (2.5 MG/3ML) 0.083% nebulizer solution, Take 2.5 mg by nebulization Every 4 (Four) Hours As Needed for Wheezing., Disp: , Rfl:   •  amLODIPine (NORVASC) 5 MG tablet, Take 5 mg by mouth Daily., Disp: , Rfl:   •  aspirin 81 MG chewable tablet, Chew 81 mg Daily., Disp: , Rfl:   •  atorvastatin (LIPITOR) 80 MG tablet, Take 80 mg by mouth Daily., Disp: , Rfl:   •  carvedilol (COREG) 25 MG tablet, Take 25 mg by mouth 2 (Two) Times a Day With Meals., Disp: , Rfl:   •  chlorthalidone (HYGROTON) 25 MG tablet, Take 25 mg by mouth Daily., Disp: , Rfl:   •  Cholecalciferol (VITAMIN D3) 1000 units capsule, Take 1,000 Units by mouth Daily., Disp: , Rfl:   •   clopidogrel (PLAVIX) 75 MG tablet, Take 75 mg by mouth Daily., Disp: , Rfl:   •  Ferrous Sulfate (IRON) 325 (65 Fe) MG tablet, Take 325 mg by mouth 2 (Two) Times a Day., Disp: , Rfl:   •  furosemide (LASIX) 20 MG tablet, Take 20 mg by mouth Every Other Day., Disp: , Rfl:   •  gabapentin (NEURONTIN) 300 MG capsule, Take 300 mg by mouth 3 (Three) Times a Day., Disp: , Rfl:   •  Insulin Degludec (TRESIBA FLEXTOUCH SC), Inject 20 Units under the skin into the appropriate area as directed Every Night., Disp: , Rfl:   •  insulin NPH-insulin regular (humuLIN 70/30,novoLIN 70/30) (70-30) 100 UNIT/ML injection, Inject 22 Units under the skin into the appropriate area as directed 2 (Two) Times a Day With Meals. 22 units in AM and 21 units in PM, Disp: , Rfl:   •  irbesartan (AVAPRO) 300 MG tablet, Take 300 mg by mouth Every Night., Disp: , Rfl:   •  levothyroxine (SYNTHROID, LEVOTHROID) 25 MCG tablet, Take 25 mcg by mouth Daily., Disp: , Rfl:   •  nicotine (NICODERM CQ) 21 MG/24HR patch, Place 1 patch on the skin as directed by provider Daily., Disp: 28 each, Rfl: 0  •  pantoprazole (PROTONIX) 40 MG EC tablet, Take 40 mg by mouth 2 (Two) Times a Day., Disp: , Rfl:   •  sodium bicarbonate 650 MG tablet, Take 650 mg by mouth 2 (Two) Times a Day., Disp: , Rfl:   •  spironolactone (ALDACTONE) 100 MG tablet, Take 50 mg by mouth Every Night., Disp: , Rfl:   •  SUPER B COMPLEX/C capsule, Take 1 tablet by mouth Daily., Disp: , Rfl:   •  tiotropium (SPIRIVA) 18 MCG per inhalation capsule, Place 1 capsule into inhaler and inhale Daily., Disp: , Rfl:   •  vitamin B-12 (CYANOCOBALAMIN) 500 MCG tablet, Take 500 mcg by mouth Daily., Disp: , Rfl:   •  vitamin C (ASCORBIC ACID) 500 MG tablet, Take 500 mg by mouth Daily., Disp: , Rfl:   Allergies   Allergen Reactions   • Lortab [Hydrocodone-Acetaminophen] Hallucinations   • Oxycontin [Oxycodone Hcl] Hallucinations   • Percocet [Oxycodone-Acetaminophen] Hallucinations     Social History  "    Social History   • Marital status:      Spouse name: N/A   • Number of children: 2   • Years of education: N/A     Occupational History   • Retired       [employer]      Social History Main Topics   • Smoking status: Current Every Day Smoker     Packs/day: 1.50     Years: 60.00     Types: Cigarettes   • Smokeless tobacco: Never Used   • Alcohol use No   • Drug use: No   • Sexual activity: Defer     Other Topics Concern   • Not on file     Social History Narrative    Lives alone in [place].     Family History   Problem Relation Age of Onset   • Diabetes insipidus Mother    • Diabetes Mother    • Kidney failure Mother    • Dementia Father      ROS  Vitals:    11/08/18 1243   BP: 143/68   BP Location: Left arm   Patient Position: Sitting   Pulse: 79   Weight: 73.5 kg (162 lb)   Height: 154.9 cm (61\")      Physical Exam  VASCULAR:   The feet are warm.  The posterior tibial pulses are palpable and have good quality Doppler signals.     Assessment/Plan:  I will see the patient back as needed.  She is to continue her antiplatelet therapy as directed.    Patient Active Problem List   Diagnosis   • PAD (S/P Right Fem-Fem Bypass)   • Hyperlipidemia   • HTN (hypertension)   • Chronic kidney disease   • Diabetes mellitus type II, controlled (CMS/[facility])   • COPD (chronic obstructive pulmonary disease) (CMS/[facility])   • CAD (coronary artery disease)   • GERD (gastroesophageal reflux disease)   • Hypothyroidism   • Tobacco abuse     CC: RICKEY Uriostegui transcribing on behalf of Triston Floyd MD    I, Triston Floyd MD, have read and agree with the editing done by Ena Son, .  "

## 2020-06-01 ENCOUNTER — APPOINTMENT (OUTPATIENT)
Dept: WOMENS IMAGING | Facility: HOSPITAL | Age: 75
End: 2020-06-01

## 2020-06-01 PROCEDURE — 77067 SCR MAMMO BI INCL CAD: CPT | Performed by: RADIOLOGY

## 2021-01-11 DIAGNOSIS — E78.2 MIXED HYPERLIPIDEMIA: Primary | ICD-10-CM

## 2021-01-11 RX ORDER — ATORVASTATIN CALCIUM 80 MG/1
80 TABLET, FILM COATED ORAL DAILY
Qty: 30 TABLET | Refills: 3 | Status: SHIPPED | OUTPATIENT
Start: 2021-01-11 | End: 2021-04-05 | Stop reason: SDUPTHER

## 2021-01-11 RX ORDER — EZETIMIBE 10 MG/1
10 TABLET ORAL DAILY
Qty: 30 TABLET | Refills: 3 | Status: SHIPPED | OUTPATIENT
Start: 2021-01-11 | End: 2021-04-05 | Stop reason: SDUPTHER

## 2021-01-11 NOTE — TELEPHONE ENCOUNTER
NEEDING REFILL OF EZETIMIDE 10MG,1QD,,,,,,,, ATORVASTIN 80MG  1QD,,,,,,,,,,, NOW HAS [facility]     FAX: [phone]     90 DAY SUPPLIE      [facility] MAIL ORDER

## 2021-01-11 NOTE — TELEPHONE ENCOUNTER
Dr. Lopez:     Last Office Visit: 07/27/2020  Last Labs: 10/27/2020  Next Lab Visit: N/A   Next Office Visit: N/A      :  Please book patient for a follow up appointment for labs and office visit.

## 2021-01-29 ENCOUNTER — OFFICE VISIT (OUTPATIENT)
Dept: CARDIOLOGY | Facility: CLINIC | Age: 76
End: 2021-01-29

## 2021-01-29 VITALS
WEIGHT: 163 LBS | HEART RATE: 82 BPM | RESPIRATION RATE: 12 BRPM | DIASTOLIC BLOOD PRESSURE: 63 MMHG | OXYGEN SATURATION: 99 % | SYSTOLIC BLOOD PRESSURE: 147 MMHG | BODY MASS INDEX: 32 KG/M2 | HEIGHT: 60 IN

## 2021-01-29 DIAGNOSIS — I25.10 CORONARY ARTERY DISEASE INVOLVING NATIVE CORONARY ARTERY OF NATIVE HEART WITHOUT ANGINA PECTORIS: Primary | ICD-10-CM

## 2021-01-29 DIAGNOSIS — E78.00 PURE HYPERCHOLESTEROLEMIA: ICD-10-CM

## 2021-01-29 DIAGNOSIS — I73.9 PAD (PERIPHERAL ARTERY DISEASE) (HCC): ICD-10-CM

## 2021-01-29 DIAGNOSIS — I10 ESSENTIAL HYPERTENSION: ICD-10-CM

## 2021-01-29 PROCEDURE — 99443 PR PHYS/QHP TELEPHONE EVALUATION 21-30 MIN: CPT | Performed by: INTERNAL MEDICINE

## 2021-01-29 RX ORDER — HYDROCHLOROTHIAZIDE 50 MG/1
50 TABLET ORAL 2 TIMES DAILY
COMMUNITY
Start: 2021-01-04 | End: 2021-07-07

## 2021-01-29 NOTE — PROGRESS NOTES
[facility]  [facility] CARDIOLOGY  [address] [place] [address]  Dept: [phone]  Dept Fax: [phone]    Vivi Son  1945    Televisit Note    You have chosen to receive care through a telephone visit. Do you consent to use a telephone visit for your medical care today? Yes    History of Present Illness:  Vivi Son is a 75 y.o. female who presents via phone for Follow-up. CAD- She denies any chest pain, she has has prior stent to LAD in 2015 and to RCA 2017. On ASA and Plavix, no complaints still smoking, also on Coreg 25 bid    The following portions of the patient's history were reviewed and updated as appropriate: allergies, current medications, past family history, past medical history, past social history, past surgical history and problem list.    This visit was scheduled as a phone visit to comply with patient safety concerns in accordance with CDC recommendations.  Time spent in discussion with the patient was 30 minutes.     Medications  acetaminophen  albuterol  albuterol sulfate HFA  amLODIPine  aspirin  atorvastatin  carvedilol  chlorthalidone  clopidogrel  ezetimibe  ferrous sulfate  furosemide  gabapentin  glucose blood  hydroCHLOROthiazide  insulin NPH-insulin regular  ipratropium-albuterol  levothyroxine  pantoprazole  Super B Complex/C capsule  tiotropium  vitamin C  Vitamin C capsule  Vitamin D3 capsule    Subjective  Allergies   Allergen Reactions   • Cefuroxime GI Intolerance   • Lortab [Hydrocodone-Acetaminophen] Hallucinations   • Oxycontin [Oxycodone Hcl] Hallucinations   • Percocet [Oxycodone-Acetaminophen] Hallucinations        Past Medical History:   Diagnosis Date   • Anemia    • Anxiety    • Arthritis    • Aspiration of food     pill   • Bleeding ulcer    • Blood transfusion abn reaction or complication, no procedure mishap    • BMI 31.0-31.9,adult    • CAD (coronary artery disease)    • CHF (congestive heart failure) (CMS/[facility])    • Chronic  kidney disease    • COPD (chronic obstructive pulmonary disease) (CMS/[facility])    • Cough    • Depression    • Diabetes mellitus type II, controlled (CMS/[facility])     DX 1985, FSBS 3-4 X DAY   • Dyslipidemia    • Edema    • Fever, unspecified    • GERD (gastroesophageal reflux disease)    • History of bronchitis    • History of pneumonia    • History of transfusion    • HTN (hypertension)    • Hyperlipidemia    • Hypothyroidism    • Long term (current) use of insulin (CMS/[facility])    • MI (myocardial infarction) (CMS/[facility])    • Non-small cell carcinoma of right lung (CMS/[facility])    • Osteoarthrosis, wrist    • PUD (peptic ulcer disease)    • PVD (peripheral vascular disease) (CMS/[facility])    • Tremors of nervous system    • Wears dentures    • Wears glasses        Past Surgical History:   Procedure Laterality Date   • AORTAGRAM N/A 9/4/2018    Procedure: AORTAGRAM WITH RUNOFFS RIGHT COMMON ILLIAC AND DISTAL ABDOMINAL AORTA STENT;  Surgeon: Triston Floyd MD;  Location: [facility] HYBRID OR 15;  Service: Vascular   • APPENDECTOMY  1978   • CARDIAC CATHETERIZATION     • CARPAL TUNNEL RELEASE Right    • COLONOSCOPY     • CORONARY ANGIOPLASTY WITH STENT PLACEMENT      [facility] - unknown physician name    • EAR TUBES     • EAR TUBES Left    • ENDOSCOPY     • FEMORAL ENDARTERECTOMY Right 9/21/2018    Procedure: FEMORAL ENDARTERECTOMY;  Surgeon: Triston Floyd MD;  Location: [facility] OR;  Service: Vascular   • FEMORAL FEMORAL BYPASS Right 9/21/2018    Procedure: FEMORAL FEMORAL BYPASS RIGHT;  Surgeon: Triston Floyd MD;  Location: [facility] OR;  Service: Vascular   • LUMBAR SPINE SURGERY     • LUNG LOBECTOMY Right     RUL    • SACROCOCCYGEAL ULCER REMOVAL     • VOCAL CORD BIOPSY         Family History   Problem Relation Age of Onset   • Diabetes insipidus Mother    • Diabetes Mother    • Kidney failure Mother    • Dementia Father    • Coronary artery disease Other         LESS THAN 60 YEARS OF AGE   • Coronary artery disease  "Brother         Social History     Socioeconomic History   • Marital status:      Spouse name: Not on file   • Number of children: 2   • Years of education: Not on file   • Highest education level: Not on file   Occupational History   • Occupation: Retired      Comment: [employer]    Tobacco Use   • Smoking status: Current Every Day Smoker     Packs/day: 1.50     Years: 60.00     Pack years: 90.00     Types: Cigarettes   • Smokeless tobacco: Never Used   Substance and Sexual Activity   • Alcohol use: No   • Drug use: No   • Sexual activity: Defer   Social History Narrative    Lives alone in [place].       Cardiovascular Procedures    ECHO/MUGA:  STRESS TESTS:   CARDIAC CATH:   DEVICES:   HOLTER:   CT/MRI:   VASCULAR:   CARDIOTHORACIC:     Objective  Vitals:    01/29/21 0756   BP: 147/63  Comment: pt checked at home   BP Location: Left arm   Patient Position: Sitting   Cuff Size: Adult   Pulse: 82   Resp: 12   SpO2: 99%   Weight: 73.9 kg (163 lb)   Height: 152.4 cm (60\")   PainSc: 0-No pain     Body mass index is 31.83 kg/m².    Assessment and Plan  Diagnoses and all orders for this visit:    Coronary artery disease involving native coronary artery of native heart without angina pectoris- No chest pain, on ASA and Plavix, prior stents, still smoking also on coreg and normal EF    Essential hypertension- The BP is fine, on Amlodipine, HCTZ 50 mg bid, Clorthalidone 50 mg and also takes lasix 20 every other day, she seems a nephrology Dr Bolivar,     Pure hypercholesterolemia- On Lipitor 80 mg and also zetia 10 mg    PAD (S/P Right Fem-Fem Bypass)- She has multiples stent on her legs and in 2018 bypass.,     Other orders  -     hydroCHLOROthiazide (HYDRODIURIL) 50 MG tablet; Take 50 mg by mouth 2 (Two) Times a Day.        No follow-ups on file.    Nimesh Lopez MD  01/29/2021  "

## 2021-02-05 ENCOUNTER — LAB (OUTPATIENT)
Dept: CARDIOLOGY | Facility: CLINIC | Age: 76
End: 2021-02-05

## 2021-02-05 DIAGNOSIS — I10 ESSENTIAL HYPERTENSION: Primary | ICD-10-CM

## 2021-02-05 DIAGNOSIS — E78.2 MIXED HYPERLIPIDEMIA: ICD-10-CM

## 2021-02-06 LAB
ALBUMIN SERPL-MCNC: 3.7 G/DL (ref 3.7–4.7)
ALBUMIN/GLOB SERPL: 1.1 {RATIO} (ref 1.2–2.2)
ALP SERPL-CCNC: 116 IU/L (ref 39–117)
ALT SERPL-CCNC: 13 IU/L (ref 0–32)
AST SERPL-CCNC: 23 IU/L (ref 0–40)
BASOPHILS # BLD AUTO: 0.1 X10E3/UL (ref 0–0.2)
BASOPHILS NFR BLD AUTO: 1 %
BILIRUB SERPL-MCNC: <0.2 MG/DL (ref 0–1.2)
BUN SERPL-MCNC: 39 MG/DL (ref 8–27)
BUN/CREAT SERPL: 23 (ref 12–28)
CALCIUM SERPL-MCNC: 8.9 MG/DL (ref 8.7–10.3)
CHLORIDE SERPL-SCNC: 104 MMOL/L (ref 96–106)
CHOLEST SERPL-MCNC: 104 MG/DL (ref 100–199)
CO2 SERPL-SCNC: 18 MMOL/L (ref 20–29)
CREAT SERPL-MCNC: 1.7 MG/DL (ref 0.57–1)
EOSINOPHIL # BLD AUTO: 0.4 X10E3/UL (ref 0–0.4)
EOSINOPHIL NFR BLD AUTO: 2 %
ERYTHROCYTE [DISTWIDTH] IN BLOOD BY AUTOMATED COUNT: 13 % (ref 11.7–15.4)
GLOBULIN SER CALC-MCNC: 3.3 G/DL (ref 1.5–4.5)
GLUCOSE SERPL-MCNC: 145 MG/DL (ref 65–99)
HCT VFR BLD AUTO: 31.3 % (ref 34–46.6)
HDLC SERPL-MCNC: 32 MG/DL
HGB BLD-MCNC: 10.2 G/DL (ref 11.1–15.9)
IMM GRANULOCYTES # BLD AUTO: 0.1 X10E3/UL (ref 0–0.1)
IMM GRANULOCYTES NFR BLD AUTO: 1 %
LDLC SERPL CALC-MCNC: 50 MG/DL (ref 0–99)
LYMPHOCYTES # BLD AUTO: 2.3 X10E3/UL (ref 0.7–3.1)
LYMPHOCYTES NFR BLD AUTO: 14 %
MCH RBC QN AUTO: 31.4 PG (ref 26.6–33)
MCHC RBC AUTO-ENTMCNC: 32.6 G/DL (ref 31.5–35.7)
MCV RBC AUTO: 96 FL (ref 79–97)
MONOCYTES # BLD AUTO: 1 X10E3/UL (ref 0.1–0.9)
MONOCYTES NFR BLD AUTO: 7 %
NEUTROPHILS # BLD AUTO: 12 X10E3/UL (ref 1.4–7)
NEUTROPHILS NFR BLD AUTO: 75 %
PLATELET # BLD AUTO: 351 X10E3/UL (ref 150–450)
POTASSIUM SERPL-SCNC: ABNORMAL MMOL/L
PROT SERPL-MCNC: 7 G/DL (ref 6–8.5)
RBC # BLD AUTO: 3.25 X10E6/UL (ref 3.77–5.28)
SODIUM SERPL-SCNC: 141 MMOL/L (ref 134–144)
TRIGL SERPL-MCNC: 121 MG/DL (ref 0–149)
VLDLC SERPL CALC-MCNC: 22 MG/DL (ref 5–40)
WBC # BLD AUTO: 15.9 X10E3/UL (ref 3.4–10.8)

## 2021-02-08 DIAGNOSIS — I25.10 CORONARY ARTERY DISEASE INVOLVING NATIVE CORONARY ARTERY OF NATIVE HEART WITHOUT ANGINA PECTORIS: Primary | ICD-10-CM

## 2021-02-08 RX ORDER — CLOPIDOGREL BISULFATE 75 MG/1
75 TABLET ORAL DAILY
Qty: 90 TABLET | Refills: 3 | Status: SHIPPED | OUTPATIENT
Start: 2021-02-08 | End: 2022-05-16

## 2021-02-08 NOTE — TELEPHONE ENCOUNTER
Wanting refill of:    plavix 75mg     90 day supply  To [facility] mail order

## 2021-02-15 ENCOUNTER — TELEPHONE (OUTPATIENT)
Dept: CARDIOLOGY | Facility: CLINIC | Age: 76
End: 2021-02-15

## 2021-02-15 NOTE — TELEPHONE ENCOUNTER
Pt is calling regarding her recent lab results, she would like to know why her potassium lab was canceled?

## 2021-03-02 ENCOUNTER — LAB (OUTPATIENT)
Dept: CARDIOLOGY | Facility: CLINIC | Age: 76
End: 2021-03-02

## 2021-03-02 DIAGNOSIS — I10 ESSENTIAL HYPERTENSION: Primary | ICD-10-CM

## 2021-03-03 LAB
ALBUMIN SERPL-MCNC: 4 G/DL (ref 3.7–4.7)
ALBUMIN/GLOB SERPL: 1.4 {RATIO} (ref 1.2–2.2)
ALP SERPL-CCNC: 115 IU/L (ref 39–117)
ALT SERPL-CCNC: 23 IU/L (ref 0–32)
AST SERPL-CCNC: 27 IU/L (ref 0–40)
BILIRUB SERPL-MCNC: <0.2 MG/DL (ref 0–1.2)
BUN SERPL-MCNC: 41 MG/DL (ref 8–27)
BUN/CREAT SERPL: 25 (ref 12–28)
CALCIUM SERPL-MCNC: 8.7 MG/DL (ref 8.7–10.3)
CHLORIDE SERPL-SCNC: 107 MMOL/L (ref 96–106)
CO2 SERPL-SCNC: 20 MMOL/L (ref 20–29)
CREAT SERPL-MCNC: 1.62 MG/DL (ref 0.57–1)
GLOBULIN SER CALC-MCNC: 2.8 G/DL (ref 1.5–4.5)
GLUCOSE SERPL-MCNC: 167 MG/DL (ref 65–99)
POTASSIUM SERPL-SCNC: 5.1 MMOL/L (ref 3.5–5.2)
PROT SERPL-MCNC: 6.8 G/DL (ref 6–8.5)
SODIUM SERPL-SCNC: 141 MMOL/L (ref 134–144)

## 2021-04-05 DIAGNOSIS — E78.2 MIXED HYPERLIPIDEMIA: ICD-10-CM

## 2021-04-05 RX ORDER — ATORVASTATIN CALCIUM 80 MG/1
80 TABLET, FILM COATED ORAL DAILY
Qty: 90 TABLET | Refills: 3 | Status: SHIPPED | OUTPATIENT
Start: 2021-04-05 | End: 2022-06-01 | Stop reason: SDUPTHER

## 2021-04-05 RX ORDER — EZETIMIBE 10 MG/1
10 TABLET ORAL DAILY
Qty: 90 TABLET | Refills: 3 | Status: SHIPPED | OUTPATIENT
Start: 2021-04-05 | End: 2022-06-01 | Stop reason: SDUPTHER

## 2021-04-26 DIAGNOSIS — I10 ESSENTIAL HYPERTENSION: Primary | ICD-10-CM

## 2021-04-26 RX ORDER — CARVEDILOL 25 MG/1
25 TABLET ORAL 2 TIMES DAILY WITH MEALS
Qty: 180 TABLET | Refills: 3 | Status: SHIPPED | OUTPATIENT
Start: 2021-04-26 | End: 2022-05-16

## 2021-04-26 RX ORDER — AMLODIPINE BESYLATE 10 MG/1
10 TABLET ORAL DAILY
Qty: 90 TABLET | Refills: 3 | Status: SHIPPED | OUTPATIENT
Start: 2021-04-26 | End: 2022-06-01 | Stop reason: SDUPTHER

## 2021-07-06 ENCOUNTER — TELEPHONE (OUTPATIENT)
Dept: CARDIOLOGY | Facility: CLINIC | Age: 76
End: 2021-07-06

## 2021-07-07 ENCOUNTER — OFFICE VISIT (OUTPATIENT)
Dept: CARDIOLOGY | Facility: CLINIC | Age: 76
End: 2021-07-07

## 2021-07-07 VITALS
HEART RATE: 86 BPM | SYSTOLIC BLOOD PRESSURE: 166 MMHG | HEIGHT: 60 IN | RESPIRATION RATE: 12 BRPM | BODY MASS INDEX: 32.59 KG/M2 | TEMPERATURE: 97 F | DIASTOLIC BLOOD PRESSURE: 84 MMHG | OXYGEN SATURATION: 96 % | WEIGHT: 166 LBS

## 2021-07-07 DIAGNOSIS — E78.00 PURE HYPERCHOLESTEROLEMIA: ICD-10-CM

## 2021-07-07 DIAGNOSIS — I25.10 CORONARY ARTERY DISEASE INVOLVING NATIVE CORONARY ARTERY OF NATIVE HEART WITHOUT ANGINA PECTORIS: ICD-10-CM

## 2021-07-07 DIAGNOSIS — Z72.0 TOBACCO ABUSE: ICD-10-CM

## 2021-07-07 DIAGNOSIS — I10 ESSENTIAL HYPERTENSION: Primary | ICD-10-CM

## 2021-07-07 PROCEDURE — 93000 ELECTROCARDIOGRAM COMPLETE: CPT | Performed by: INTERNAL MEDICINE

## 2021-07-07 PROCEDURE — 99214 OFFICE O/P EST MOD 30 MIN: CPT | Performed by: INTERNAL MEDICINE

## 2021-07-07 RX ORDER — DIPHENHYDRAMINE HCL 25 MG
TABLET ORAL
COMMUNITY
Start: 2021-05-25 | End: 2022-06-21

## 2021-07-07 RX ORDER — HYDRALAZINE HYDROCHLORIDE 50 MG/1
50 TABLET, FILM COATED ORAL 2 TIMES DAILY
Qty: 60 TABLET | Refills: 0 | Status: SHIPPED | OUTPATIENT
Start: 2021-07-07 | End: 2022-04-07

## 2021-07-07 RX ORDER — HYDRALAZINE HYDROCHLORIDE 50 MG/1
50 TABLET, FILM COATED ORAL DAILY
COMMUNITY
Start: 2021-07-02 | End: 2021-07-07 | Stop reason: SDUPTHER

## 2021-07-07 RX ORDER — FUROSEMIDE 20 MG/1
20 TABLET ORAL DAILY
Qty: 90 TABLET | Refills: 3 | Status: SHIPPED | OUTPATIENT
Start: 2021-07-07 | End: 2022-06-01 | Stop reason: SDUPTHER

## 2021-07-07 NOTE — PROGRESS NOTES
[facility]  [facility] CARDIOLOGY  [address] [place] [address]  Dept: [phone]  Dept Fax: [phone]    Vivi Son  1945    Follow Up Office Visit Note    History of Present Illness:  Vivi Son is a 76 y.o. female who presents to the clinic for Follow-up. Edema. She has stop taking the HCTZ. 50 mg bid and started having edema, ,her BP aloso is high, 160.80 she was given Hydralaizne 50 mg bid PRN, will do do daily, will increase the Lasix to 20 mg daily, keep Clorthalidone 25 mg daily    The following portions of the patient's history were reviewed and updated as appropriate: allergies, current medications, past family history, past medical history, past social history, past surgical history and problem list.    Medications:  acetaminophen  albuterol  albuterol sulfate HFA  amLODIPine  aspirin  atorvastatin  carvedilol  chlorthalidone  clopidogrel  ezetimibe  ferrous sulfate  furosemide  gabapentin  glucose blood  hydrALAZINE  insulin NPH-insulin regular  ipratropium-albuterol  levothyroxine  pantoprazole  Super B Complex/C capsule  tiotropium  True Metrix Air Glucose Meter kit  vitamin C  Vitamin C capsule  Vitamin D3 capsule    Subjective  Allergies   Allergen Reactions   • Cefuroxime GI Intolerance   • Lortab [Hydrocodone-Acetaminophen] Hallucinations   • Oxycontin [Oxycodone Hcl] Hallucinations   • Percocet [Oxycodone-Acetaminophen] Hallucinations        Past Medical History:   Diagnosis Date   • Anemia    • Anxiety    • Arthritis    • Aspiration of food     pill   • Bleeding ulcer    • Blood transfusion abn reaction or complication, no procedure mishap    • BMI 31.0-31.9,adult    • CAD (coronary artery disease)    • CHF (congestive heart failure) (CMS/[facility])    • Chronic kidney disease    • COPD (chronic obstructive pulmonary disease) (CMS/[facility])    • Cough    • Depression    • Diabetes mellitus type II, controlled (CMS/[facility])     DX 1985, FSBS 3-4 X DAY   •  Dyslipidemia    • Edema    • Fever, unspecified    • GERD (gastroesophageal reflux disease)    • History of bronchitis    • History of pneumonia    • History of transfusion    • HTN (hypertension)    • Hyperlipidemia    • Hypothyroidism    • Long term (current) use of insulin (CMS/[facility])    • MI (myocardial infarction) (CMS/[facility])    • Non-small cell carcinoma of right lung (CMS/[facility])    • Osteoarthrosis, wrist    • PUD (peptic ulcer disease)    • PVD (peripheral vascular disease) (CMS/[facility])    • Tremors of nervous system    • Wears dentures    • Wears glasses        Past Surgical History:   Procedure Laterality Date   • AORTAGRAM N/A 9/4/2018    Procedure: AORTAGRAM WITH RUNOFFS RIGHT COMMON ILLIAC AND DISTAL ABDOMINAL AORTA STENT;  Surgeon: Triston Floyd MD;  Location:  [facility] HYBRID OR 15;  Service: Vascular   • APPENDECTOMY  1978   • CARDIAC CATHETERIZATION     • CARPAL TUNNEL RELEASE Right    • COLONOSCOPY     • CORONARY ANGIOPLASTY WITH STENT PLACEMENT      [facility] - unknown physician name    • EAR TUBES     • EAR TUBES Left    • ENDOSCOPY     • FEMORAL ENDARTERECTOMY Right 9/21/2018    Procedure: FEMORAL ENDARTERECTOMY;  Surgeon: Triston Floyd MD;  Location:  [facility] OR;  Service: Vascular   • FEMORAL FEMORAL BYPASS Right 9/21/2018    Procedure: FEMORAL FEMORAL BYPASS RIGHT;  Surgeon: Triston Floyd MD;  Location:  [facility] OR;  Service: Vascular   • LUMBAR SPINE SURGERY     • LUNG LOBECTOMY Right     RUL    • SACROCOCCYGEAL ULCER REMOVAL     • VOCAL CORD BIOPSY         Family History   Problem Relation Age of Onset   • Diabetes insipidus Mother    • Diabetes Mother    • Kidney failure Mother    • Dementia Father    • Coronary artery disease Other         LESS THAN 60 YEARS OF AGE   • Coronary artery disease Brother         Social History     Socioeconomic History   • Marital status:      Spouse name: Not on file   • Number of children: 2   • Years of education: Not on file   • Highest  "education level: Not on file   Tobacco Use   • Smoking status: Current Every Day Smoker     Packs/day: 1.50     Years: 60.00     Pack years: 90.00     Types: Cigarettes   • Smokeless tobacco: Never Used   Vaping Use   • Vaping Use: Never used   Substance and Sexual Activity   • Alcohol use: No   • Drug use: No   • Sexual activity: Defer       Review of Systems   Constitutional: Negative.    HENT: Negative.    Respiratory: Positive for shortness of breath.    Cardiovascular: Positive for leg swelling.   Endocrine: Negative.    Genitourinary: Negative.    Musculoskeletal: Negative.    Skin: Negative.    Allergic/Immunologic: Negative.    Neurological: Negative.    Hematological: Negative.    Psychiatric/Behavioral: Negative.    All other systems reviewed and are negative.      Cardiovascular Procedures    ECHO/MUGA:   STRESS TESTS:   CARDIAC CATH:   DEVICES:   HOLTER:   CT/MRI:   VASCULAR:   CARDIOTHORACIC:     Objective  Vitals:    07/07/21 0914   BP: 166/84   BP Location: Left arm   Patient Position: Lying   Pulse: 86   Resp: 12   Temp: 97 °F (36.1 °C)   TempSrc: Infrared   SpO2: 96%   Weight: 75.3 kg (166 lb)   Height: 152.4 cm (60\")   PainSc: 0-No pain     Body mass index is 32.42 kg/m².     Physical Exam  Constitutional:       Appearance: Healthy appearance. Not in distress.   Neck:      Vascular: No JVR. JVD normal.   Pulmonary:      Effort: Pulmonary effort is normal.      Breath sounds: Normal breath sounds. No wheezing. No rhonchi. No rales.   Chest:      Chest wall: Not tender to palpatation.   Cardiovascular:      PMI at left midclavicular line. Normal rate. Regular rhythm. Normal S1. Normal S2.      Murmurs: There is no murmur.      No gallop. No click. No rub.   Pulses:     Intact distal pulses.   Edema:     Thigh: bilateral 3+ edema of the thigh.     Pretibial: bilateral 3+ edema of the pretibial area.     Ankle: bilateral 3+ edema of the ankle.     Feet: bilateral 3+ edema of the feet.  Abdominal:      " General: Bowel sounds are normal.      Palpations: Abdomen is soft.      Tenderness: There is no abdominal tenderness.   Musculoskeletal: Normal range of motion.         General: No tenderness. Skin:     General: Skin is warm and dry.   Neurological:      General: No focal deficit present.      Mental Status: Alert and oriented to person, place and time.          Diagnostic Data    ECG 12 Lead    Date/Time: 7/7/2021 10:06 AM  Performed by: Nimesh Lopez MD  Authorized by: Nimesh Lopez MD   Comparison: compared with previous ECG   Rhythm: sinus rhythm  Rate: normal  BPM: 73  QRS axis: normal    Clinical impression: normal ECG            Assessment and Plan  Diagnoses and all orders for this visit:    Essential hypertension- the BP is elevated 160.80, , will use Hydralazine 50 mg bid, Amlodipine 10 mg, Coreg 25 bid, plus Lasix 20 mg daily    Pure hypercholesterolemia- On Lipitor 80 mg LDl is very good from [facility]    Coronary artery disease involving native coronary artery of native heart without angina pectoris- prior stents to LAD and RCA on Asa and Plavix, no chest pain, EKG sinus Hr 73 no ischemic changes     Tobacco abuse- Still smoking,     Edema - Which is chronic, 2-3 plus, will increase Lasix to 20 mg daily, we might need to lower the Amlodipine when BP is better as well as Neurontin    Other orders  -     Discontinue: hydrALAZINE (APRESOLINE) 50 MG tablet; Take 50 mg by mouth Daily.  -     Blood Glucose Monitoring Suppl (True Metrix Air Glucose Meter) w/Device kit  -     furosemide (LASIX) 20 MG tablet; Take 1 tablet by mouth Daily.  -     hydrALAZINE (APRESOLINE) 50 MG tablet; Take 1 tablet by mouth 2 (two) times a day.         Return in about 2 weeks (around 7/21/2021) for Recheck.    Nimesh Lopez MD  07/07/2021

## 2021-07-19 ENCOUNTER — TELEPHONE (OUTPATIENT)
Dept: CARDIOLOGY | Facility: CLINIC | Age: 76
End: 2021-07-19

## 2021-07-19 NOTE — TELEPHONE ENCOUNTER
PT SAID THE hydrALAZINE 50 MG BID IS CAUSING HER SOB AND SHE IS GETTING MORE AND MORE SHORT OF BREATH . PLEASE ADVISE?

## 2021-07-20 ENCOUNTER — OFFICE VISIT (OUTPATIENT)
Dept: CARDIOLOGY | Facility: CLINIC | Age: 76
End: 2021-07-20

## 2021-07-20 VITALS
WEIGHT: 162 LBS | HEIGHT: 60 IN | OXYGEN SATURATION: 92 % | TEMPERATURE: 97 F | BODY MASS INDEX: 31.8 KG/M2 | HEART RATE: 86 BPM | RESPIRATION RATE: 12 BRPM | DIASTOLIC BLOOD PRESSURE: 82 MMHG | SYSTOLIC BLOOD PRESSURE: 152 MMHG

## 2021-07-20 DIAGNOSIS — Z72.0 TOBACCO ABUSE: ICD-10-CM

## 2021-07-20 DIAGNOSIS — R06.02 SHORTNESS OF BREATH: ICD-10-CM

## 2021-07-20 DIAGNOSIS — I73.9 PAD (PERIPHERAL ARTERY DISEASE) (HCC): ICD-10-CM

## 2021-07-20 DIAGNOSIS — E78.00 PURE HYPERCHOLESTEROLEMIA: ICD-10-CM

## 2021-07-20 DIAGNOSIS — I25.10 CORONARY ARTERY DISEASE INVOLVING NATIVE CORONARY ARTERY OF NATIVE HEART WITHOUT ANGINA PECTORIS: Primary | ICD-10-CM

## 2021-07-20 DIAGNOSIS — I10 ESSENTIAL HYPERTENSION: ICD-10-CM

## 2021-07-20 PROCEDURE — 99214 OFFICE O/P EST MOD 30 MIN: CPT | Performed by: INTERNAL MEDICINE

## 2021-07-20 NOTE — TELEPHONE ENCOUNTER
Likely the Hydralazine is not the reason, her BP was high when she was here and have edema, plus she is smoker, so many other reason might be the reason why she is SOB and no the medicines, she is suppose to be back soon if no , needs an appointment

## 2021-07-21 ENCOUNTER — TELEPHONE (OUTPATIENT)
Dept: CARDIOLOGY | Facility: CLINIC | Age: 76
End: 2021-07-21

## 2021-07-21 LAB
BASOPHILS # BLD AUTO: 0.1 X10E3/UL (ref 0–0.2)
BASOPHILS NFR BLD AUTO: 1 %
BUN SERPL-MCNC: 46 MG/DL (ref 8–27)
BUN/CREAT SERPL: 25 (ref 12–28)
CALCIUM SERPL-MCNC: 8.3 MG/DL (ref 8.7–10.3)
CHLORIDE SERPL-SCNC: 104 MMOL/L (ref 96–106)
CO2 SERPL-SCNC: 18 MMOL/L (ref 20–29)
CREAT SERPL-MCNC: 1.86 MG/DL (ref 0.57–1)
EOSINOPHIL # BLD AUTO: 0.1 X10E3/UL (ref 0–0.4)
EOSINOPHIL NFR BLD AUTO: 1 %
ERYTHROCYTE [DISTWIDTH] IN BLOOD BY AUTOMATED COUNT: 12.6 % (ref 11.7–15.4)
GLUCOSE SERPL-MCNC: 260 MG/DL (ref 65–99)
HCT VFR BLD AUTO: 30.9 % (ref 34–46.6)
HGB BLD-MCNC: 10 G/DL (ref 11.1–15.9)
IMM GRANULOCYTES # BLD AUTO: 0.1 X10E3/UL (ref 0–0.1)
IMM GRANULOCYTES NFR BLD AUTO: 1 %
LYMPHOCYTES # BLD AUTO: 1.3 X10E3/UL (ref 0.7–3.1)
LYMPHOCYTES NFR BLD AUTO: 7 %
MCH RBC QN AUTO: 32.4 PG (ref 26.6–33)
MCHC RBC AUTO-ENTMCNC: 32.4 G/DL (ref 31.5–35.7)
MCV RBC AUTO: 100 FL (ref 79–97)
MONOCYTES # BLD AUTO: 1 X10E3/UL (ref 0.1–0.9)
MONOCYTES NFR BLD AUTO: 6 %
NEUTROPHILS # BLD AUTO: 14.4 X10E3/UL (ref 1.4–7)
NEUTROPHILS NFR BLD AUTO: 84 %
PLATELET # BLD AUTO: 252 X10E3/UL (ref 150–450)
POTASSIUM SERPL-SCNC: 4.7 MMOL/L (ref 3.5–5.2)
RBC # BLD AUTO: 3.09 X10E6/UL (ref 3.77–5.28)
SODIUM SERPL-SCNC: 141 MMOL/L (ref 134–144)
WBC # BLD AUTO: 17 X10E3/UL (ref 3.4–10.8)

## 2021-07-21 NOTE — TELEPHONE ENCOUNTER
This is highly sentive please let me or Dr Lopez talk to pt Please let her know they saw something on the left lung pneumonia or mass / we need a CT chest , ask her if she has had one this year, if no, will get one in [facility] .   Tried to leave message for pt

## 2021-07-21 NOTE — PROGRESS NOTES
[facility] [place]  [facility] CARDIOLOGY  [address] [place] [address]  Dept: [phone]  Dept Fax: [phone]    Vivi Son  1945    Follow Up Office Visit Note    History of Present Illness:  Vivi Son is a 76 y.o. female who presents to the clinic for Follow-up. Edema and Sob - she has not more edema after we told the Lasix daily 20 mg, but she is complaining of SOB, and she thinks was related to hydralazine and has stopped last week and still SOB, , her BP is good 130,80, she has CRF creatinine 1,9, I think we need to lookf for exnm2xf for the Son, she did have a prior echo and stress nuclear normal, she is obese, has COPD and still smoking, will get a CXR, her report today showed opacity on the left lung, will try to get a CT chest, she should keep Lasix 20 mg daily and will not take the Hydralazine     The following portions of the patient's history were reviewed and updated as appropriate: allergies, current medications, past family history, past medical history, past social history, past surgical history and problem list.    Medications:  acetaminophen  albuterol  albuterol sulfate HFA  amLODIPine  aspirin  atorvastatin  carvedilol  chlorthalidone  clopidogrel  ezetimibe  ferrous sulfate  furosemide  gabapentin  glucose blood  hydrALAZINE  insulin NPH-insulin regular  ipratropium-albuterol  levothyroxine  pantoprazole  Super B Complex/C capsule  tiotropium  True Metrix Air Glucose Meter kit  vitamin C  Vitamin C capsule  Vitamin D3 capsule    Subjective  Allergies   Allergen Reactions   • Cefuroxime GI Intolerance   • Lortab [Hydrocodone-Acetaminophen] Hallucinations   • Oxycontin [Oxycodone Hcl] Hallucinations   • Percocet [Oxycodone-Acetaminophen] Hallucinations        Past Medical History:   Diagnosis Date   • Anemia    • Anxiety    • Arthritis    • Aspiration of food     pill   • Bleeding ulcer    • Blood transfusion abn reaction or complication, no procedure  mishap    • BMI 31.0-31.9,adult    • CAD (coronary artery disease)    • CHF (congestive heart failure) (CMS/[facility])    • Chronic kidney disease    • COPD (chronic obstructive pulmonary disease) (CMS/[facility])    • Cough    • Depression    • Diabetes mellitus type II, controlled (CMS/[facility])     DX 1985, FSBS 3-4 X DAY   • Dyslipidemia    • Edema    • Fever, unspecified    • GERD (gastroesophageal reflux disease)    • History of bronchitis    • History of pneumonia    • History of transfusion    • HTN (hypertension)    • Hyperlipidemia    • Hypothyroidism    • Long term (current) use of insulin (CMS/[facility])    • MI (myocardial infarction) (CMS/[facility])    • Non-small cell carcinoma of right lung (CMS/[facility])    • Osteoarthrosis, wrist    • PUD (peptic ulcer disease)    • PVD (peripheral vascular disease) (CMS/[facility])    • Tremors of nervous system    • Wears dentures    • Wears glasses        Past Surgical History:   Procedure Laterality Date   • AORTAGRAM N/A 9/4/2018    Procedure: AORTAGRAM WITH RUNOFFS RIGHT COMMON ILLIAC AND DISTAL ABDOMINAL AORTA STENT;  Surgeon: Triston Floyd MD;  Location: [facility] HYBRID OR 15;  Service: Vascular   • APPENDECTOMY  1978   • CARDIAC CATHETERIZATION     • CARPAL TUNNEL RELEASE Right    • COLONOSCOPY     • CORONARY ANGIOPLASTY WITH STENT PLACEMENT      [facility] - unknown physician name    • EAR TUBES     • EAR TUBES Left    • ENDOSCOPY     • FEMORAL ENDARTERECTOMY Right 9/21/2018    Procedure: FEMORAL ENDARTERECTOMY;  Surgeon: Triston Floyd MD;  Location:  [facility] OR;  Service: Vascular   • FEMORAL FEMORAL BYPASS Right 9/21/2018    Procedure: FEMORAL FEMORAL BYPASS RIGHT;  Surgeon: Triston Floyd MD;  Location:  [facility] OR;  Service: Vascular   • LUMBAR SPINE SURGERY     • LUNG LOBECTOMY Right     RUL    • SACROCOCCYGEAL ULCER REMOVAL     • VOCAL CORD BIOPSY         Family History   Problem Relation Age of Onset   • Diabetes insipidus Mother    • Diabetes Mother    • Kidney failure  "Mother    • Dementia Father    • Coronary artery disease Other         LESS THAN 60 YEARS OF AGE   • Coronary artery disease Brother         Social History     Socioeconomic History   • Marital status:      Spouse name: Not on file   • Number of children: 2   • Years of education: Not on file   • Highest education level: Not on file   Tobacco Use   • Smoking status: Current Every Day Smoker     Packs/day: 1.50     Years: 60.00     Pack years: 90.00     Types: Cigarettes   • Smokeless tobacco: Never Used   Vaping Use   • Vaping Use: Never used   Substance and Sexual Activity   • Alcohol use: No   • Drug use: No   • Sexual activity: Defer       Review of Systems   Constitutional: Negative.    HENT: Negative.    Respiratory: Positive for shortness of breath.    Cardiovascular: Negative.    Endocrine: Negative.    Genitourinary: Negative.    Musculoskeletal: Negative.    Skin: Negative.    Allergic/Immunologic: Negative.    Neurological: Negative.    Hematological: Negative.    Psychiatric/Behavioral: Negative.        Cardiovascular Procedures    ECHO/MUGA:   STRESS TESTS:   CARDIAC CATH:   DEVICES:   HOLTER:   CT/MRI:   VASCULAR:   CARDIOTHORACIC:     Objective  Vitals:    07/20/21 0934   BP: 152/82   BP Location: Left arm   Patient Position: Lying   Cuff Size: Adult   Pulse: 86   Resp: 12   Temp: 97 °F (36.1 °C)   TempSrc: Infrared   SpO2: 92%   Weight: 73.5 kg (162 lb)   Height: 152.4 cm (60\")   PainSc: 0-No pain     Body mass index is 31.64 kg/m².     Physical Exam  Vitals reviewed.   Constitutional:       Appearance: Healthy appearance. Not in distress.   Neck:      Vascular: No JVR. JVD normal.   Pulmonary:      Effort: Pulmonary effort is normal.      Breath sounds: Normal breath sounds. No wheezing. No rhonchi. No rales.   Chest:      Chest wall: Not tender to palpatation.   Cardiovascular:      PMI at left midclavicular line. Normal rate. Regular rhythm. Normal S1. Normal S2.      Murmurs: There is no " murmur.      No gallop. No click. No rub.   Pulses:     Intact distal pulses.   Edema:     Peripheral edema absent.   Abdominal:      General: Bowel sounds are normal.      Palpations: Abdomen is soft.      Tenderness: There is no abdominal tenderness.   Musculoskeletal: Normal range of motion.         General: No tenderness. Skin:     General: Skin is warm and dry.   Neurological:      General: No focal deficit present.      Mental Status: Alert and oriented to person, place and time.          Diagnostic Data  Procedures    Assessment and Plan  Diagnoses and all orders for this visit:    Coronary artery disease involving native coronary artery of native heart without angina pectoris-prior stent to LAD and RCA, on Asa and Plavix, recent stress nuclear ,normal , w  -     Adult Transthoracic Echo Complete W/ Cont if Necessary Per Protocol; Future  -     Stress Test With Myocardial Perfusion One Day; Future  -     XR Chest PA & Lateral; Future  -     CBC & Differential  -     Basic Metabolic Panel    Tobacco abuse- She was advised to quit moking    Essential hypertension- The BP is better 130.80 on Lasix 20 mg, Amlodipine 10 mg and Chlorthalidone     Pure hypercholesterolemia- On Crestor     PAD (S/P Right Fem-Fem Bypass) s/p stent and Bypass on her legs   Shortness of breath- Cxr is abnormal, will get a CT chest with no contrast          No follow-ups on file.    Nimesh Lopez MD  07/20/2021

## 2021-07-21 NOTE — TELEPHONE ENCOUNTER
Please let her know they saw something on the left lung pneumonia or mass / we need a CT chest , ask her if she has had one this year, if no, will get one in [place] or [facility]       Spoke with pt and [place] [facility] Pt said that she a little nervous but will do anything Dr Lopez says

## 2021-10-12 ENCOUNTER — TELEPHONE (OUTPATIENT)
Dept: CARDIOLOGY | Facility: CLINIC | Age: 76
End: 2021-10-12

## 2021-10-12 NOTE — TELEPHONE ENCOUNTER
Please call her her heart beat has been over 100     They stopped the coreg  In hosp when she had covid       [phone]

## 2022-03-21 NOTE — TELEPHONE ENCOUNTER
Pharmacy called with questions on the patients Pantoprazole 40 mg tablet. She states that the patient advised her that the new directions should states 2 tablets daily and the directions the pharmacy has states that it is once daily.     Please advise and call pharmacy back at [phone].

## 2022-03-22 RX ORDER — PANTOPRAZOLE SODIUM 40 MG/1
40 TABLET, DELAYED RELEASE ORAL DAILY
Qty: 30 TABLET | Refills: 6 | Status: SHIPPED | OUTPATIENT
Start: 2022-03-22 | End: 2022-03-28 | Stop reason: SDUPTHER

## 2022-03-25 NOTE — TELEPHONE ENCOUNTER
Patient called again today about the pantoprazole 40 mg.  She says it is supposed to be taken two times a day.  She's asking for an updated script for a 12 day supply to be sent to [facility] and the rest to be sent to [facility], [address], [place] [address], Fax [phone].

## 2022-03-28 RX ORDER — PANTOPRAZOLE SODIUM 40 MG/1
40 TABLET, DELAYED RELEASE ORAL 2 TIMES DAILY
Qty: 180 TABLET | Refills: 1 | Status: SHIPPED | OUTPATIENT
Start: 2022-03-28 | End: 2022-09-16

## 2022-03-28 RX ORDER — PANTOPRAZOLE SODIUM 40 MG/1
40 TABLET, DELAYED RELEASE ORAL 2 TIMES DAILY
Qty: 24 TABLET | Refills: 0 | Status: SHIPPED | OUTPATIENT
Start: 2022-03-28 | End: 2022-03-28 | Stop reason: SDUPTHER

## 2022-04-07 ENCOUNTER — OFFICE VISIT (OUTPATIENT)
Dept: FAMILY MEDICINE CLINIC | Facility: CLINIC | Age: 77
End: 2022-04-07

## 2022-04-07 VITALS
OXYGEN SATURATION: 92 % | HEIGHT: 60 IN | DIASTOLIC BLOOD PRESSURE: 60 MMHG | TEMPERATURE: 97.3 F | BODY MASS INDEX: 30.55 KG/M2 | HEART RATE: 72 BPM | WEIGHT: 155.6 LBS | SYSTOLIC BLOOD PRESSURE: 132 MMHG

## 2022-04-07 DIAGNOSIS — E66.09 CLASS 1 OBESITY DUE TO EXCESS CALORIES WITH SERIOUS COMORBIDITY AND BODY MASS INDEX (BMI) OF 30.0 TO 30.9 IN ADULT: ICD-10-CM

## 2022-04-07 DIAGNOSIS — Z79.899 HIGH RISK MEDICATION USE: ICD-10-CM

## 2022-04-07 DIAGNOSIS — E11.42 POLYNEUROPATHY DUE TO TYPE 2 DIABETES MELLITUS: Primary | ICD-10-CM

## 2022-04-07 PROBLEM — K21.9 GASTROESOPHAGEAL REFLUX DISEASE WITHOUT ESOPHAGITIS: Status: ACTIVE | Noted: 2022-04-07

## 2022-04-07 PROBLEM — Z86.2 HISTORY OF ANEMIA: Status: ACTIVE | Noted: 2022-04-07

## 2022-04-07 PROBLEM — Z91.81 AT HIGH RISK FOR FALLS: Status: ACTIVE | Noted: 2022-04-07

## 2022-04-07 PROBLEM — E66.9 OBESITY: Status: ACTIVE | Noted: 2022-04-07

## 2022-04-07 PROBLEM — Z95.828 S/P FEMORAL-FEMORAL BYPASS SURGERY: Status: ACTIVE | Noted: 2022-04-07

## 2022-04-07 PROBLEM — I25.10 CORONARY ATHEROSCLEROSIS: Status: ACTIVE | Noted: 2022-04-07

## 2022-04-07 PROBLEM — Z72.0 TOBACCO USE: Status: ACTIVE | Noted: 2022-04-07

## 2022-04-07 PROBLEM — Z86.79 HISTORY OF CHF (CONGESTIVE HEART FAILURE): Status: ACTIVE | Noted: 2022-04-07

## 2022-04-07 PROBLEM — R05.9 COUGH: Status: ACTIVE | Noted: 2022-04-07

## 2022-04-07 PROBLEM — Z86.59 HISTORY OF ANXIETY: Status: ACTIVE | Noted: 2022-04-07

## 2022-04-07 PROBLEM — N18.32 STAGE 3B CHRONIC KIDNEY DISEASE: Status: ACTIVE | Noted: 2022-04-07

## 2022-04-07 PROBLEM — G62.9 PERIPHERAL POLYNEUROPATHY: Status: ACTIVE | Noted: 2022-04-07

## 2022-04-07 PROBLEM — I25.10 CORONARY ARTERIOSCLEROSIS: Status: ACTIVE | Noted: 2022-04-07

## 2022-04-07 PROBLEM — E11.9 TYPE 2 DIABETES MELLITUS: Status: ACTIVE | Noted: 2022-04-07

## 2022-04-07 PROBLEM — E03.9 ACQUIRED HYPOTHYROIDISM: Status: ACTIVE | Noted: 2022-04-07

## 2022-04-07 PROBLEM — J44.1 ACUTE EXACERBATION OF CHRONIC OBSTRUCTIVE AIRWAYS DISEASE: Status: ACTIVE | Noted: 2022-04-07

## 2022-04-07 PROCEDURE — 99213 OFFICE O/P EST LOW 20 MIN: CPT | Performed by: FAMILY MEDICINE

## 2022-04-07 RX ORDER — GABAPENTIN 300 MG/1
300 CAPSULE ORAL 3 TIMES DAILY
Qty: 90 CAPSULE | Refills: 2 | Status: SHIPPED | OUTPATIENT
Start: 2022-04-07 | End: 2022-07-27 | Stop reason: SDUPTHER

## 2022-04-07 NOTE — ASSESSMENT & PLAN NOTE
Diabetes is improving with treatment.   Continue current treatment regimen.  Reminded to bring in blood sugar diary at next visit.  Discussed ways to avoid symptomatic hypoglycemia.  Diabetes will be reassessed in 1 month.    Gabapentin refilled.  Adverse effects discussed.  Avoid alcohol, driving, or operating machinery while taking medications.

## 2022-04-07 NOTE — PROGRESS NOTES
Date: 2022   Patient Name: Vivi Son  : 1945   MRN: [id number]     Chief Complaint:    Chief Complaint   Patient presents with   • Peripheral Neuropathy     Controlled substance refill       History of Present Illness: Vivi Son is a 76 y.o. female who is here today to follow up for Diabetic neuropathy.  Patient is requesting refills on gabapentin.  She reports this continues to work well without side effects.  She is due for physical and blood work but is not fasting today.           Review of Systems:   Review of Systems   Constitutional: Positive for fatigue. Negative for chills and fever.   Respiratory: Negative for cough and shortness of breath.    Cardiovascular: Negative for chest pain and palpitations.   Gastrointestinal: Negative for abdominal pain, constipation, diarrhea, nausea and vomiting.   Musculoskeletal: Negative for back pain and myalgias.   Neurological: Negative for dizziness and headache.   Psychiatric/Behavioral: Negative for depressed mood. The patient is not nervous/anxious.        Past Medical History:   Past Medical History:   Diagnosis Date   • Anemia    • Anxiety    • Arthritis    • Aspiration of food     pill   • Bleeding ulcer    • Blood transfusion abn reaction or complication, no procedure mishap    • BMI 31.0-31.9,adult    • CAD (coronary artery disease)    • CHF (congestive heart failure) ([facility])    • Chronic kidney disease    • COPD (chronic obstructive pulmonary disease) ([facility])    • Cough    • Depression    • Diabetes mellitus type II, controlled ([facility])     DX , FSBS 3-4 X DAY   • Dyslipidemia    • Edema    • Fever, unspecified    • GERD (gastroesophageal reflux disease)    • History of bronchitis    • History of pneumonia    • History of transfusion    • HTN (hypertension)    • Hyperlipidemia    • Hypothyroidism    • Long term (current) use of insulin ([facility])    • MI (myocardial infarction) ([facility])    • Non-small cell carcinoma of right lung ([facility])    •  Osteoarthrosis, wrist    • PUD (peptic ulcer disease)    • PVD (peripheral vascular disease) ([facility])    • Tremors of nervous system    • Wears dentures    • Wears glasses        Past Surgical History:   Past Surgical History:   Procedure Laterality Date   • AORTAGRAM N/A 9/4/2018    Procedure: AORTAGRAM WITH RUNOFFS RIGHT COMMON ILLIAC AND DISTAL ABDOMINAL AORTA STENT;  Surgeon: Triston Floyd MD;  Location:  [facility];  Service: Vascular   • APPENDECTOMY  1978   • CARDIAC CATHETERIZATION     • CARPAL TUNNEL RELEASE Right    • COLONOSCOPY     • CORONARY ANGIOPLASTY WITH STENT PLACEMENT      [facility] - unknown physician name    • EAR TUBES     • EAR TUBES Left    • ENDOSCOPY     • FEMORAL ENDARTERECTOMY Right 9/21/2018    Procedure: FEMORAL ENDARTERECTOMY;  Surgeon: Triston Floyd MD;  Location:  [facility];  Service: Vascular   • FEMORAL FEMORAL BYPASS Right 9/21/2018    Procedure: FEMORAL FEMORAL BYPASS RIGHT;  Surgeon: Triston Floyd MD;  Location:  [facility];  Service: Vascular   • LUMBAR SPINE SURGERY     • LUNG LOBECTOMY Right     RUL    • SACROCOCCYGEAL ULCER REMOVAL     • VOCAL CORD BIOPSY         Family History:   Family History   Problem Relation Age of Onset   • Diabetes insipidus Mother    • Diabetes Mother    • Kidney failure Mother    • Dementia Father    • Coronary artery disease Other         LESS THAN 60 YEARS OF AGE   • Coronary artery disease Brother        Social History:   Social History     Socioeconomic History   • Marital status:    • Number of children: 2   Tobacco Use   • Smoking status: Current Every Day Smoker     Packs/day: 1.50     Years: 60.00     Pack years: 90.00     Types: Cigarettes   • Smokeless tobacco: Never Used   Vaping Use   • Vaping Use: Never used   Substance and Sexual Activity   • Alcohol use: No   • Drug use: No   • Sexual activity: Defer       Medications:     Current Outpatient Medications:   •  albuterol (PROVENTIL) (2.5 MG/3ML)  0.083% nebulizer solution, Take 2.5 mg by nebulization Every 4 (Four) Hours As Needed for Wheezing., Disp: , Rfl:   •  amLODIPine (NORVASC) 10 MG tablet, Take 1 tablet by mouth Daily., Disp: 90 tablet, Rfl: 3  •  Ascorbic Acid (Vitamin C) 500 MG capsule, Take 1 capsule by mouth Daily., Disp: , Rfl:   •  aspirin 81 MG EC tablet, Take 1 tablet by mouth Daily., Disp: , Rfl:   •  atorvastatin (LIPITOR) 80 MG tablet, Take 1 tablet by mouth Daily., Disp: 90 tablet, Rfl: 3  •  Blood Glucose Monitoring Suppl (True Metrix Air Glucose Meter) w/Device kit, , Disp: , Rfl:   •  carvedilol (COREG) 25 MG tablet, Take 1 tablet by mouth 2 (Two) Times a Day With Meals., Disp: 180 tablet, Rfl: 3  •  chlorthalidone (HYGROTON) 25 MG tablet, Take 25 mg by mouth Daily., Disp: , Rfl:   •  Cholecalciferol (VITAMIN D3) 1000 units capsule, Take 1,000 Units by mouth Daily., Disp: , Rfl:   •  clopidogrel (PLAVIX) 75 MG tablet, Take 1 tablet by mouth Daily., Disp: 90 tablet, Rfl: 3  •  ezetimibe (ZETIA) 10 MG tablet, Take 1 tablet by mouth Daily., Disp: 90 tablet, Rfl: 3  •  Ferrous Sulfate (IRON) 325 (65 Fe) MG tablet, Take 325 mg by mouth 2 (Two) Times a Day., Disp: , Rfl:   •  furosemide (LASIX) 20 MG tablet, Take 1 tablet by mouth Daily., Disp: 90 tablet, Rfl: 3  •  gabapentin (NEURONTIN) 300 MG capsule, Take 1 capsule by mouth 3 (Three) Times a Day., Disp: 90 capsule, Rfl: 2  •  glucose blood test strip, TEST EVERY DAY, Disp: , Rfl:   •  insulin NPH-insulin regular (humuLIN 70/30,novoLIN 70/30) (70-30) 100 UNIT/ML injection, Inject 22 Units under the skin into the appropriate area as directed 2 (Two) Times a Day With Meals. 32 units in AM and 30 units in PM, Disp: , Rfl:   •  ipratropium-albuterol (DUO-NEB) 0.5-2.5 mg/3 ml nebulizer, Take 3 mL by nebulization Every 4 (Four) Hours As Needed for Wheezing., Disp: , Rfl:   •  levothyroxine (SYNTHROID, LEVOTHROID) 25 MCG tablet, Take 25 mcg by mouth Daily., Disp: , Rfl:   •  pantoprazole  "(PROTONIX) 40 MG EC tablet, Take 1 tablet by mouth 2 (Two) Times a Day., Disp: 180 tablet, Rfl: 1    Allergies:   Allergies   Allergen Reactions   • Cefuroxime GI Intolerance   • Lortab [Hydrocodone-Acetaminophen] Hallucinations   • Oxycontin [Oxycodone Hcl] Hallucinations   • Percocet [Oxycodone-Acetaminophen] Hallucinations       PHQ-2 Total Score: 0   PHQ-9 Total Score: 0     Physical Exam:  Vital Signs:   Vitals:    04/07/22 1051   BP: 132/60   BP Location: Left arm   Patient Position: Sitting   Cuff Size: Adult   Pulse: 72   Temp: 97.3 °F (36.3 °C)   TempSrc: Temporal   SpO2: 92%   Weight: 70.6 kg (155 lb 9.6 oz)   Height: 152.4 cm (60\")     Body mass index is 30.39 kg/m².     Physical Exam  Vitals and nursing note reviewed.   Constitutional:       Appearance: She is obese.      Comments: Chronically ill-appearing   HENT:      Head: Normocephalic.   Cardiovascular:      Rate and Rhythm: Normal rate and regular rhythm.      Heart sounds: Normal heart sounds.   Pulmonary:      Effort: Pulmonary effort is normal.   Abdominal:      General: Bowel sounds are normal.      Palpations: Abdomen is soft.   Skin:     General: Skin is warm.   Neurological:      Mental Status: She is alert and oriented to person, place, and time.   Psychiatric:         Mood and Affect: Mood normal.         Behavior: Behavior normal.           Assessment/Plan:   Diagnoses and all orders for this visit:    1. 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   Continue current treatment regimen.  Reminded to bring in blood sugar diary at next visit.  Discussed ways to avoid symptomatic hypoglycemia.  Diabetes will be reassessed in 1 month.    Gabapentin refilled.  Adverse effects discussed.  Avoid alcohol, driving, or operating machinery while taking medications.      Orders:  -     gabapentin (NEURONTIN) 300 MG capsule; Take 1 capsule by mouth 3 (Three) Times a Day.  " Dispense: 90 capsule; Refill: 2    2. High risk medication use    3. Class 1 obesity due to excess calories with serious comorbidity and body mass index (BMI) of 30.0 to 30.9 in adult  Assessment & Plan:  Patient's (Body mass index is 30.39 kg/m².) indicates that they are obese (BMI >30) with health conditions that include hypertension, diabetes mellitus and dyslipidemias . Weight is unchanged. BMI is is above average; BMI management plan is completed. We discussed portion control and increasing exercise.            Follow Up:   Return in about 1 month (around 5/7/2022) for Annual physical.    Claire Abad DO  [facility]

## 2022-05-06 ENCOUNTER — OFFICE VISIT (OUTPATIENT)
Dept: FAMILY MEDICINE CLINIC | Facility: CLINIC | Age: 77
End: 2022-05-06

## 2022-05-06 VITALS
OXYGEN SATURATION: 94 % | HEART RATE: 80 BPM | WEIGHT: 152.3 LBS | BODY MASS INDEX: 29.9 KG/M2 | SYSTOLIC BLOOD PRESSURE: 132 MMHG | TEMPERATURE: 97.3 F | DIASTOLIC BLOOD PRESSURE: 80 MMHG | HEIGHT: 60 IN

## 2022-05-06 DIAGNOSIS — N95.1 MENOPAUSAL SYNDROME: ICD-10-CM

## 2022-05-06 DIAGNOSIS — Z12.31 ENCOUNTER FOR SCREENING MAMMOGRAM FOR MALIGNANT NEOPLASM OF BREAST: ICD-10-CM

## 2022-05-06 DIAGNOSIS — I13.0 HYPERTENSIVE HEART AND CHRONIC KIDNEY DISEASE WITH HEART FAILURE AND STAGE 1 THROUGH STAGE 4 CHRONIC KIDNEY DISEASE, OR UNSPECIFIED CHRONIC KIDNEY DISEASE: ICD-10-CM

## 2022-05-06 DIAGNOSIS — E11.65 TYPE 2 DIABETES MELLITUS WITH HYPERGLYCEMIA, WITH LONG-TERM CURRENT USE OF INSULIN: ICD-10-CM

## 2022-05-06 DIAGNOSIS — E66.3 OVERWEIGHT: ICD-10-CM

## 2022-05-06 DIAGNOSIS — E55.9 VITAMIN D DEFICIENCY: ICD-10-CM

## 2022-05-06 DIAGNOSIS — Z87.891 HISTORY OF TOBACCO ABUSE: ICD-10-CM

## 2022-05-06 DIAGNOSIS — Z79.4 TYPE 2 DIABETES MELLITUS WITH HYPERGLYCEMIA, WITH LONG-TERM CURRENT USE OF INSULIN: ICD-10-CM

## 2022-05-06 DIAGNOSIS — Z00.00 ENCOUNTER FOR WELLNESS EXAMINATION IN ADULT: Primary | ICD-10-CM

## 2022-05-06 DIAGNOSIS — N18.32 STAGE 3B CHRONIC KIDNEY DISEASE: ICD-10-CM

## 2022-05-06 DIAGNOSIS — I73.9 PERIPHERAL VASCULAR DISEASE: ICD-10-CM

## 2022-05-06 DIAGNOSIS — C34.90 MALIGNANT NEOPLASM OF UNSPECIFIED PART OF UNSPECIFIED BRONCHUS OR LUNG: ICD-10-CM

## 2022-05-06 PROBLEM — E03.9 HYPOTHYROIDISM, UNSPECIFIED: Status: ACTIVE | Noted: 2021-01-01

## 2022-05-06 PROBLEM — I50.9 HEART FAILURE, UNSPECIFIED (HCC): Status: ACTIVE | Noted: 2021-08-15

## 2022-05-06 PROBLEM — J44.9 COPD (CHRONIC OBSTRUCTIVE PULMONARY DISEASE): Status: RESOLVED | Noted: 2018-09-21 | Resolved: 2022-05-06

## 2022-05-06 PROBLEM — I25.10 ATHEROSCLEROTIC HEART DISEASE OF NATIVE CORONARY ARTERY WITHOUT ANGINA PECTORIS: Status: ACTIVE | Noted: 2021-08-15

## 2022-05-06 PROBLEM — E11.9 DIABETES MELLITUS TYPE II, CONTROLLED: Status: RESOLVED | Noted: 2018-09-21 | Resolved: 2022-05-06

## 2022-05-06 PROBLEM — J44.9 CHRONIC OBSTRUCTIVE PULMONARY DISEASE, UNSPECIFIED (HCC): Status: ACTIVE | Noted: 2021-08-15

## 2022-05-06 PROBLEM — N17.9 ACUTE KIDNEY FAILURE, UNSPECIFIED (HCC): Status: ACTIVE | Noted: 2021-08-15

## 2022-05-06 PROBLEM — M85.80 OSTEOPENIA: Status: ACTIVE | Noted: 2022-05-06

## 2022-05-06 PROBLEM — A41.9 SEPSIS, UNSPECIFIED ORGANISM: Status: ACTIVE | Noted: 2021-08-15

## 2022-05-06 PROBLEM — I10 ESSENTIAL (PRIMARY) HYPERTENSION: Status: ACTIVE | Noted: 2021-08-15

## 2022-05-06 PROBLEM — C34.91 NON-SMALL CELL CARCINOMA OF RIGHT LUNG: Status: ACTIVE | Noted: 2022-05-06

## 2022-05-06 PROBLEM — J96.21 ACUTE AND CHRONIC RESPIRATORY FAILURE WITH HYPOXIA (HCC): Status: ACTIVE | Noted: 2021-08-15

## 2022-05-06 PROBLEM — J18.9 PNEUMONIA, UNSPECIFIED ORGANISM: Status: ACTIVE | Noted: 2021-08-15

## 2022-05-06 PROBLEM — E11.9 TYPE 2 DIABETES MELLITUS WITHOUT COMPLICATIONS: Status: ACTIVE | Noted: 2021-01-01

## 2022-05-06 PROBLEM — J96.21 ACUTE AND CHRONIC RESPIRATORY FAILURE WITH HYPOXIA: Status: RESOLVED | Noted: 2021-08-15 | Resolved: 2022-05-06

## 2022-05-06 PROBLEM — J44.9 CHRONIC OBSTRUCTIVE PULMONARY DISEASE, UNSPECIFIED: Status: RESOLVED | Noted: 2021-08-15 | Resolved: 2022-05-06

## 2022-05-06 PROBLEM — J44.1 ACUTE EXACERBATION OF CHRONIC OBSTRUCTIVE AIRWAYS DISEASE: Status: RESOLVED | Noted: 2022-04-07 | Resolved: 2022-05-06

## 2022-05-06 LAB
POC CREATININE URINE: NEGATIVE
POC MICROALBUMIN URINE: 50

## 2022-05-06 PROCEDURE — 96160 PT-FOCUSED HLTH RISK ASSMT: CPT | Performed by: FAMILY MEDICINE

## 2022-05-06 PROCEDURE — 82044 UR ALBUMIN SEMIQUANTITATIVE: CPT | Performed by: FAMILY MEDICINE

## 2022-05-06 PROCEDURE — G0439 PPPS, SUBSEQ VISIT: HCPCS | Performed by: FAMILY MEDICINE

## 2022-05-06 PROCEDURE — 1126F AMNT PAIN NOTED NONE PRSNT: CPT | Performed by: FAMILY MEDICINE

## 2022-05-06 PROCEDURE — 1170F FXNL STATUS ASSESSED: CPT | Performed by: FAMILY MEDICINE

## 2022-05-06 PROCEDURE — 99397 PER PM REEVAL EST PAT 65+ YR: CPT | Performed by: FAMILY MEDICINE

## 2022-05-06 PROCEDURE — 1159F MED LIST DOCD IN RCRD: CPT | Performed by: FAMILY MEDICINE

## 2022-05-06 NOTE — ASSESSMENT & PLAN NOTE
Renal condition is worsening.  Fluid restriction.  Weight loss.  Regular aerobic exercise.  Stop smoking.  Renal condition will be reassessed in 3 months.

## 2022-05-06 NOTE — PROGRESS NOTES
QUICK REFERENCE INFORMATION:  The ABCs of the Annual Wellness Visit    Subsequent Medicare Wellness Visit    @[email or web address]@    HEALTH RISK ASSESSMENT    1945    Recent Hospitalizations:  Recently treated at the following:  Other: [facility] 7/2021 for COVID pneumonia.        Current Medical Providers:  Patient Care Team:  Claire Abad DO as PCP - General (Family Medicine)  Nimesh Lopez MD as Consulting Physician (Cardiology)        Smoking Status:  Social History     Tobacco Use   Smoking Status Current Every Day Smoker   • Packs/day: 1.50   • Years: 60.00   • Pack years: 90.00   • Types: Cigarettes   Smokeless Tobacco Never Used       Alcohol Consumption:  Social History     Substance and Sexual Activity   Alcohol Use No       Depression Screen:   PHQ-2/PHQ-9 Depression Screening 4/7/2022   Little Interest or Pleasure in Doing Things 0-->not at all   Feeling Down, Depressed or Hopeless 0-->not at all   PHQ-9: Brief Depression Severity Measure Score 0       Health Habits and Functional and Cognitive Screening:  Functional & Cognitive Status 5/6/2022   Do you have difficulty preparing food and eating? No   Do you have difficulty bathing yourself, getting dressed or grooming yourself? No   Do you have difficulty using the toilet? No   Do you have difficulty moving around from place to place? Yes   Do you have trouble with steps or getting out of a bed or a chair? Yes   Current Diet Well Balanced Diet   Dental Exam Not up to date   Eye Exam Not up to date   Exercise (times per week) 0 times per week   Do you need help using the phone?  No   Are you deaf or do you have serious difficulty hearing?  No   Do you need help with transportation? No   Do you need help shopping? No   Do you need help preparing meals?  No   Do you need help with housework?  No   Do you need help with laundry? No   Do you need help taking your medications? No   Do you need help managing money? No    Do you ever drive or ride in a car without wearing a seat belt? No   Have you felt unusual stress, anger or loneliness in the last month? No   Who do you live with? Alone   If you need help, do you have trouble finding someone available to you? No   Have you been bothered in the last four weeks by sexual problems? No   Do you have difficulty concentrating, remembering or making decisions? No       Fall Risk Screen:  BUZZ Fall Risk Assessment was completed, and patient is at HIGH risk for falls. Assessment completed on:4/7/2022    ACE III MINI        Does the patient have evidence of cognitive impairment? No    Aspirin use counseling: Taking ASA appropriately as indicated    Recent Lab Results:  CMP:  Lab Results   Component Value Date    BUN 39 (H) 05/06/2022    CREATININE 1.99 (H) 05/06/2022    EGFRIFNONA 26 (L) 07/20/2021    EGFRIFAFRI 30 (L) 07/20/2021    BCR 20 05/06/2022     05/06/2022    K 5.2 05/06/2022    CO2  05/06/2022      Comment:      Test not performed.  Due to a lack of reproducibility with this patient sample, a valid  result could not be obtained.    CALCIUM 8.5 (L) 05/06/2022    PROTENTOTREF 6.9 05/06/2022    ALBUMIN 4.3 05/06/2022    LABGLOBREF 2.6 05/06/2022    LABIL2 1.7 05/06/2022    BILITOT <0.2 05/06/2022    ALKPHOS 93 05/06/2022    AST 20 05/06/2022    ALT 19 05/06/2022     HbA1c:  Lab Results   Component Value Date    HGBA1C 8.40 (H) 09/20/2018    HGBA1C 8.80 (H) 09/03/2018     Microalbumin:  Lab Results   Component Value Date    POCMALB 50 05/06/2022    POCCREAT NEGATIVE 05/06/2022     Lipid Panel  Lab Results   Component Value Date    TRIG 130 05/06/2022    HDL 39 (L) 05/06/2022    LDL 56 05/06/2022    AST 20 05/06/2022    ALT 19 05/06/2022       Visual Acuity:  No exam data present    Age-appropriate Screening Schedule:  Refer to the list below for future screening recommendations based on patient's age, sex and/or medical conditions. Orders for these recommended tests are  listed in the plan section. The patient has been provided with a written plan.    Health Maintenance   Topic Date Due   • TDAP/TD VACCINES (1 - Tdap) Never done   • ZOSTER VACCINE (1 of 2) Never done   • DIABETIC FOOT EXAM  Never done   • DIABETIC EYE EXAM  Never done   • DXA SCAN  07/19/2021   • HEMOGLOBIN A1C  10/14/2021   • MAMMOGRAM  06/01/2022   • INFLUENZA VACCINE  08/01/2022   • LIPID PANEL  05/06/2023   • URINE MICROALBUMIN  05/06/2023        Subjective   History of Present Illness    Vivi Son is a 76 y.o. female who presents for a Subsequent Wellness Visit.  She is also due for annual physical and fasting lab work.  She is due for mammogram, DEXA and low-dose CT scan.  She had a colonoscopy last year however there is a polyp they were unable to completely remove and she is supposed to have had repeat colonoscopy however she declines at this time.    CHRONIC CONDITIONS    The following portions of the patient's history were reviewed and updated as appropriate: allergies, current medications, past family history, past medical history, past social history, past surgical history and problem list.    Outpatient Medications Prior to Visit   Medication Sig Dispense Refill   • albuterol (PROVENTIL) (2.5 MG/3ML) 0.083% nebulizer solution Take 2.5 mg by nebulization Every 4 (Four) Hours As Needed for Wheezing.     • amLODIPine (NORVASC) 10 MG tablet Take 1 tablet by mouth Daily. 90 tablet 3   • Ascorbic Acid (Vitamin C) 500 MG capsule Take 1 capsule by mouth Daily.     • aspirin 81 MG EC tablet Take 1 tablet by mouth Daily.     • atorvastatin (LIPITOR) 80 MG tablet Take 1 tablet by mouth Daily. 90 tablet 3   • Blood Glucose Monitoring Suppl (True Metrix Air Glucose Meter) w/Device kit      • chlorthalidone (HYGROTON) 25 MG tablet Take 25 mg by mouth Daily.     • Cholecalciferol (VITAMIN D3) 1000 units capsule Take 1,000 Units by mouth Daily.     • ezetimibe (ZETIA) 10 MG tablet Take 1 tablet by mouth Daily. 90  tablet 3   • Ferrous Sulfate (IRON) 325 (65 Fe) MG tablet Take 325 mg by mouth 2 (Two) Times a Day.     • furosemide (LASIX) 20 MG tablet Take 1 tablet by mouth Daily. 90 tablet 3   • gabapentin (NEURONTIN) 300 MG capsule Take 1 capsule by mouth 3 (Three) Times a Day. 90 capsule 2   • glucose blood test strip TEST EVERY DAY     • insulin NPH-insulin regular (humuLIN 70/30,novoLIN 70/30) (70-30) 100 UNIT/ML injection Inject 22 Units under the skin into the appropriate area as directed 2 (Two) Times a Day With Meals. 32 units in AM and 30 units in PM     • ipratropium-albuterol (DUO-NEB) 0.5-2.5 mg/3 ml nebulizer Take 3 mL by nebulization Every 4 (Four) Hours As Needed for Wheezing.     • levothyroxine (SYNTHROID, LEVOTHROID) 25 MCG tablet Take 25 mcg by mouth Daily.     • pantoprazole (PROTONIX) 40 MG EC tablet Take 1 tablet by mouth 2 (Two) Times a Day. 180 tablet 1   • carvedilol (COREG) 25 MG tablet Take 1 tablet by mouth 2 (Two) Times a Day With Meals. 180 tablet 3   • clopidogrel (PLAVIX) 75 MG tablet Take 1 tablet by mouth Daily. 90 tablet 3     No facility-administered medications prior to visit.       Patient Active Problem List   Diagnosis   • PAD (S/P Right Fem-Fem Bypass)   • Hyperlipidemia   • HTN (hypertension)   • Chronic kidney disease   • CAD (coronary artery disease)   • GERD (gastroesophageal reflux disease)   • Hypothyroidism   • Tobacco abuse   • Shortness of breath   • Acute frontal sinusitis   • At high risk for falls   • Cough   • Edema   • High risk medication use   • History of anemia   • History of anxiety   • History of CHF (congestive heart failure)   • Class 1 obesity due to excess calories with serious comorbidity and body mass index (BMI) of 30.0 to 30.9 in adult   • Osteoarthritis of wrist   • Polyneuropathy due to type 2 diabetes mellitus (HCC)   • Pure hypercholesterolemia   • S/P femoral-femoral bypass surgery   • Stage 3b chronic kidney disease (HCC)   • Coronary arteriosclerosis  in native artery   • Coronary arteriosclerosis   • Coronary atherosclerosis   • Type 2 diabetes mellitus with hyperglycemia, with long-term current use of insulin (HCC)   • Gastroesophageal reflux disease without esophagitis   • Mixed hyperlipidemia   • Acquired hypothyroidism   • Benign hypertension   • Peripheral vascular disease (HCC)   • History of tobacco abuse   • Peripheral polyneuropathy   • Acute kidney failure, unspecified (HCC)   • Heart failure, unspecified (HCC)   • Personal history of nicotine dependence   • Pneumonia, unspecified organism   • Sepsis, unspecified organism (HCC)   • Atherosclerotic heart disease of native coronary artery without angina pectoris   • Type 2 diabetes mellitus without complications (HCC)   • Hypothyroidism, unspecified   • Essential (primary) hypertension   • Encounter for wellness examination in adult   • Malignant neoplasm of unspecified part of unspecified bronchus or lung (HCC)   • Hypertensive heart and chronic kidney disease with heart failure and stage 1 through stage 4 chronic kidney disease, or unspecified chronic kidney disease (HCC)   • Overweight   • Vitamin D deficiency   • Encounter for screening mammogram for malignant neoplasm of breast   • Osteopenia   • Non-small cell carcinoma of right lung (HCC)   • Menopausal syndrome       Advance Care Planning:  ACP discussion was held with the patient during this visit. Patient does not have an advance directive, information provided.    Identification of Risk Factors:  Risk factors include: Breast Cancer/Mammogram Screening  Cardiovascular risk  Fall Risk  Lung Cancer Risk  Osteoporosis Risk.    Review of Systems    Compared to one year ago, the patient feels her physical health is the same.  Compared to one year ago, the patient feels her mental health is the same.    Objective     Physical Exam  Vitals and nursing note reviewed.   Constitutional:       Comments: Chronically ill-appearing   HENT:      Head:  "Normocephalic and atraumatic.      Right Ear: Tympanic membrane and ear canal normal.      Left Ear: Tympanic membrane and ear canal normal.      Nose: Nose normal.      Mouth/Throat:      Mouth: Mucous membranes are moist.      Pharynx: Oropharynx is clear.   Eyes:      Conjunctiva/sclera: Conjunctivae normal.      Pupils: Pupils are equal, round, and reactive to light.   Cardiovascular:      Rate and Rhythm: Normal rate and regular rhythm.      Heart sounds: Normal heart sounds. No murmur heard.  Pulmonary:      Effort: Pulmonary effort is normal.      Breath sounds: Normal breath sounds. No wheezing.   Abdominal:      General: Bowel sounds are normal.      Palpations: Abdomen is soft.      Tenderness: There is no abdominal tenderness.   Musculoskeletal:         General: Normal range of motion.      Cervical back: Normal range of motion.      Right lower leg: No edema.      Left lower leg: No edema.   Lymphadenopathy:      Cervical: No cervical adenopathy.   Skin:     General: Skin is warm.      Findings: No rash.   Neurological:      Mental Status: She is alert and oriented to person, place, and time.   Psychiatric:         Mood and Affect: Mood normal.         Behavior: Behavior normal.          Procedures     Vitals:    05/06/22 0829 05/06/22 0904   BP: 140/62 132/80   BP Location: Left arm    Patient Position: Sitting    Cuff Size: Adult    Pulse: 80    Temp: 97.3 °F (36.3 °C)    TempSrc: Temporal    SpO2: 94%    Weight: 69.1 kg (152 lb 4.8 oz)    Height: 152.4 cm (60\")        BMI is >= 25 and < 30. (Overweight) The following options were offered after discussion: exercise counseling/recommendations and nutrition counseling/recommendations      Lab Results   Component Value Date    WBC 12.4 (H) 05/06/2022    HGB 10.2 (L) 05/06/2022    HCT 31.2 (L) 05/06/2022    MCV 98 (H) 05/06/2022     05/06/2022     Lab Results   Component Value Date    GLUCOSE 178 (H) 05/06/2022    BUN 39 (H) 05/06/2022    CREATININE " 1.99 (H) 05/06/2022    EGFRIFNONA 26 (L) 07/20/2021    EGFRIFAFRI 30 (L) 07/20/2021    BCR 20 05/06/2022    K 5.2 05/06/2022    CO2  05/06/2022      Comment:      Test not performed.  Due to a lack of reproducibility with this patient sample, a valid  result could not be obtained.    CALCIUM 8.5 (L) 05/06/2022    PROTENTOTREF 6.9 05/06/2022    ALBUMIN 4.3 05/06/2022    LABIL2 1.7 05/06/2022    AST 20 05/06/2022    ALT 19 05/06/2022     Lab Results   Component Value Date    TSH 2.260 05/06/2022     No results found for: PSA  Lab Results   Component Value Date    CHLPL 118 05/06/2022    TRIG 130 05/06/2022    HDL 39 (L) 05/06/2022    LDL 56 05/06/2022       Assessment & Plan   Problem List Items Addressed This Visit        Cardiac and Vasculature    Peripheral vascular disease (HCC)    Current Assessment & Plan     Following with cardiovascular surgery, symptoms are stable           Hypertensive heart and chronic kidney disease with heart failure and stage 1 through stage 4 chronic kidney disease, or unspecified chronic kidney disease (HCC)    Current Assessment & Plan     Renal condition is worsening.  Weight loss.  Regular aerobic exercise.  Following with nephrology  Renal condition will be reassessed in 3 months.           Relevant Medications    chlorthalidone (HYGROTON) 25 MG tablet    amLODIPine (NORVASC) 10 MG tablet    furosemide (LASIX) 20 MG tablet    clopidogrel (PLAVIX) 75 MG tablet    carvedilol (COREG) 25 MG tablet       Endocrine and Metabolic    Type 2 diabetes mellitus with hyperglycemia, with long-term current use of insulin (HCC)    Current Assessment & Plan     Diabetes is unchanged.   Reminded to bring in blood sugar diary at next visit.  Dietary recommendations for ADA diet.  Will adjust medications as necessary after A1c  Diabetes will be reassessed in 3 months.           Relevant Medications    insulin NPH-insulin regular (humuLIN 70/30,novoLIN 70/30) (70-30) 100 UNIT/ML injection    Other  Relevant Orders    POC Microalbumin (Completed)    Overweight    Current Assessment & Plan     Patient's (Body mass index is 29.74 kg/m².) indicates that they are overweight with health conditions that include hypertension, coronary heart disease, diabetes mellitus, dyslipidemias and peripheral vascular disease . Weight is unchanged. BMI is is above average; BMI management plan is completed. We discussed portion control and increasing exercise.            Vitamin D deficiency    Relevant Orders    Vitamin D 25 Hydroxy (Completed)       Genitourinary and Reproductive     Stage 3b chronic kidney disease (HCC)    Current Assessment & Plan     Renal condition is worsening.  Fluid restriction.  Weight loss.  Regular aerobic exercise.  Stop smoking.  Renal condition will be reassessed in 3 months.           Relevant Medications    chlorthalidone (HYGROTON) 25 MG tablet    furosemide (LASIX) 20 MG tablet    Encounter for screening mammogram for malignant neoplasm of breast    Relevant Orders    Mammo Screening Bilateral With CAD    Menopausal syndrome    Relevant Orders    DEXA Bone Density Axial       Health Encounters    Encounter for wellness examination in adult - Primary    Relevant Orders    CBC Auto Differential    Comprehensive Metabolic Panel (Completed)    Hemoglobin A1c    Lipid Panel (Completed)    TSH (Completed)    CBC Auto Differential (Completed)       Hematology and Neoplasia    Malignant neoplasm of unspecified part of unspecified bronchus or lung (HCC)    Current Assessment & Plan     In remission from non-small cell lung cancer, annual low-dose CT scheduled           Relevant Medications    albuterol (PROVENTIL) (2.5 MG/3ML) 0.083% nebulizer solution    ipratropium-albuterol (DUO-NEB) 0.5-2.5 mg/3 ml nebulizer       Tobacco    History of tobacco abuse    Relevant Orders     CT Chest Low Dose Cancer Screening WO        Patient Self-Management and Personalized Health Advice  The patient has been provided  with information about: diet, exercise, weight management, prevention of cardiac or vascular disease, tobacco cessation and fall prevention and preventive services including:   · Annual Wellness Visit (AWV)  · Bone Density Measurements  · Colorectal Cancer Screening, Colonoscopy  · Lung Cancer Screening Counseling and Annual Screening for Lung Cancer with Low Dose Computed Tomography (LDCT); (use of smartset for low dose CT for lung cancer screening for documentation and orders)  · Screening Mammography .    Outpatient Encounter Medications as of 5/6/2022   Medication Sig Dispense Refill   • albuterol (PROVENTIL) (2.5 MG/3ML) 0.083% nebulizer solution Take 2.5 mg by nebulization Every 4 (Four) Hours As Needed for Wheezing.     • amLODIPine (NORVASC) 10 MG tablet Take 1 tablet by mouth Daily. 90 tablet 3   • Ascorbic Acid (Vitamin C) 500 MG capsule Take 1 capsule by mouth Daily.     • aspirin 81 MG EC tablet Take 1 tablet by mouth Daily.     • atorvastatin (LIPITOR) 80 MG tablet Take 1 tablet by mouth Daily. 90 tablet 3   • Blood Glucose Monitoring Suppl (True Metrix Air Glucose Meter) w/Device kit      • chlorthalidone (HYGROTON) 25 MG tablet Take 25 mg by mouth Daily.     • Cholecalciferol (VITAMIN D3) 1000 units capsule Take 1,000 Units by mouth Daily.     • ezetimibe (ZETIA) 10 MG tablet Take 1 tablet by mouth Daily. 90 tablet 3   • Ferrous Sulfate (IRON) 325 (65 Fe) MG tablet Take 325 mg by mouth 2 (Two) Times a Day.     • furosemide (LASIX) 20 MG tablet Take 1 tablet by mouth Daily. 90 tablet 3   • gabapentin (NEURONTIN) 300 MG capsule Take 1 capsule by mouth 3 (Three) Times a Day. 90 capsule 2   • glucose blood test strip TEST EVERY DAY     • insulin NPH-insulin regular (humuLIN 70/30,novoLIN 70/30) (70-30) 100 UNIT/ML injection Inject 22 Units under the skin into the appropriate area as directed 2 (Two) Times a Day With Meals. 32 units in AM and 30 units in PM     • ipratropium-albuterol (DUO-NEB) 0.5-2.5  mg/3 ml nebulizer Take 3 mL by nebulization Every 4 (Four) Hours As Needed for Wheezing.     • levothyroxine (SYNTHROID, LEVOTHROID) 25 MCG tablet Take 25 mcg by mouth Daily.     • pantoprazole (PROTONIX) 40 MG EC tablet Take 1 tablet by mouth 2 (Two) Times a Day. 180 tablet 1   • [DISCONTINUED] carvedilol (COREG) 25 MG tablet Take 1 tablet by mouth 2 (Two) Times a Day With Meals. 180 tablet 3   • [DISCONTINUED] clopidogrel (PLAVIX) 75 MG tablet Take 1 tablet by mouth Daily. 90 tablet 3     No facility-administered encounter medications on file as of 5/6/2022.       Reviewed use of high risk medication in the elderly: yes  Reviewed for potential of harmful drug interactions in the elderly: yes    Diagnoses and all orders for this visit:    1. Encounter for wellness examination in adult (Primary)  -     CBC Auto Differential; Future  -     Comprehensive Metabolic Panel; Future  -     Hemoglobin A1c; Future  -     Lipid Panel; Future  -     TSH; Future  -     CBC Auto Differential  -     Comprehensive Metabolic Panel  -     Hemoglobin A1c  -     Lipid Panel  -     TSH  -     CBC Auto Differential    2. Malignant neoplasm of unspecified part of unspecified bronchus or lung (HCC)  Assessment & Plan:  In remission from non-small cell lung cancer, annual low-dose CT scheduled      3. Hypertensive heart and chronic kidney disease with heart failure and stage 1 through stage 4 chronic kidney disease, or unspecified chronic kidney disease (HCC)  Assessment & Plan:  Renal condition is worsening.  Weight loss.  Regular aerobic exercise.  Following with nephrology  Renal condition will be reassessed in 3 months.      4. Peripheral vascular disease (HCC)  Assessment & Plan:  Following with cardiovascular surgery, symptoms are stable      5. Stage 3b chronic kidney disease (HCC)  Assessment & Plan:  Renal condition is worsening.  Fluid restriction.  Weight loss.  Regular aerobic exercise.  Stop smoking.  Renal condition will be  reassessed in 3 months.      6. Type 2 diabetes mellitus with hyperglycemia, with long-term current use of insulin (HCC)  Assessment & Plan:  Diabetes is unchanged.   Reminded to bring in blood sugar diary at next visit.  Dietary recommendations for ADA diet.  Will adjust medications as necessary after A1c  Diabetes will be reassessed in 3 months.    Orders:  -     POC Microalbumin    7. Overweight  Assessment & Plan:  Patient's (Body mass index is 29.74 kg/m².) indicates that they are overweight with health conditions that include hypertension, coronary heart disease, diabetes mellitus, dyslipidemias and peripheral vascular disease . Weight is unchanged. BMI is is above average; BMI management plan is completed. We discussed portion control and increasing exercise.       8. Vitamin D deficiency  -     Vitamin D 25 Hydroxy; Future  -     Vitamin D 25 Hydroxy    9. Encounter for screening mammogram for malignant neoplasm of breast  -     Mammo Screening Bilateral With CAD; Future    10. Menopausal syndrome  -     DEXA Bone Density Axial; Future    11. History of tobacco abuse  -      CT Chest Low Dose Cancer Screening WO; Future      Follow Up:  Return in about 3 months (around 8/6/2022) for Recheck with labs.     There are no Patient Instructions on file for this visit.    An After Visit Summary and PPPS with all of these plans were given to the patient.

## 2022-05-06 NOTE — ASSESSMENT & PLAN NOTE
Renal condition is worsening.  Weight loss.  Regular aerobic exercise.  Following with nephrology  Renal condition will be reassessed in 3 months.

## 2022-05-06 NOTE — ASSESSMENT & PLAN NOTE
Diabetes is unchanged.   Reminded to bring in blood sugar diary at next visit.  Dietary recommendations for ADA diet.  Will adjust medications as necessary after A1c  Diabetes will be reassessed in 3 months.

## 2022-05-06 NOTE — ASSESSMENT & PLAN NOTE
Patient's (Body mass index is 29.74 kg/m².) indicates that they are overweight with health conditions that include hypertension, coronary heart disease, diabetes mellitus, dyslipidemias and peripheral vascular disease . Weight is unchanged. BMI is is above average; BMI management plan is completed. We discussed portion control and increasing exercise.

## 2022-05-07 LAB
25(OH)D3+25(OH)D2 SERPL-MCNC: 32.1 NG/ML (ref 30–100)
BASOPHILS # BLD AUTO: 0.1 X10E3/UL (ref 0–0.2)
BASOPHILS NFR BLD AUTO: 1 %
EOSINOPHIL # BLD AUTO: 0.3 X10E3/UL (ref 0–0.4)
EOSINOPHIL NFR BLD AUTO: 3 %
ERYTHROCYTE [DISTWIDTH] IN BLOOD BY AUTOMATED COUNT: 14 % (ref 11.7–15.4)
HCT VFR BLD AUTO: 31.2 % (ref 34–46.6)
HGB BLD-MCNC: 10.2 G/DL (ref 11.1–15.9)
IMM GRANULOCYTES # BLD AUTO: 0 X10E3/UL (ref 0–0.1)
IMM GRANULOCYTES NFR BLD AUTO: 0 %
LYMPHOCYTES # BLD AUTO: 2.5 X10E3/UL (ref 0.7–3.1)
LYMPHOCYTES NFR BLD AUTO: 20 %
MCH RBC QN AUTO: 32.2 PG (ref 26.6–33)
MCHC RBC AUTO-ENTMCNC: 32.7 G/DL (ref 31.5–35.7)
MCV RBC AUTO: 98 FL (ref 79–97)
MONOCYTES # BLD AUTO: 0.8 X10E3/UL (ref 0.1–0.9)
MONOCYTES NFR BLD AUTO: 6 %
NEUTROPHILS # BLD AUTO: 8.7 X10E3/UL (ref 1.4–7)
NEUTROPHILS NFR BLD AUTO: 70 %
PLATELET # BLD AUTO: 220 X10E3/UL (ref 150–450)
RBC # BLD AUTO: 3.17 X10E6/UL (ref 3.77–5.28)
TSH SERPL DL<=0.005 MIU/L-ACNC: 2.26 UIU/ML (ref 0.45–4.5)
WBC # BLD AUTO: 12.4 X10E3/UL (ref 3.4–10.8)

## 2022-05-09 LAB
ALBUMIN SERPL-MCNC: 4.3 G/DL (ref 3.7–4.7)
ALBUMIN/GLOB SERPL: 1.7 {RATIO} (ref 1.2–2.2)
ALP SERPL-CCNC: 93 IU/L (ref 44–121)
ALT SERPL-CCNC: 19 IU/L (ref 0–32)
AST SERPL-CCNC: 20 IU/L (ref 0–40)
BILIRUB SERPL-MCNC: <0.2 MG/DL (ref 0–1.2)
BUN SERPL-MCNC: 39 MG/DL (ref 8–27)
BUN/CREAT SERPL: 20 (ref 12–28)
CALCIUM SERPL-MCNC: 8.5 MG/DL (ref 8.7–10.3)
CHLORIDE SERPL-SCNC: 110 MMOL/L (ref 96–106)
CHOLEST SERPL-MCNC: 118 MG/DL (ref 100–199)
CO2 SERPL-SCNC: ABNORMAL MMOL/L
CREAT SERPL-MCNC: 1.99 MG/DL (ref 0.57–1)
EGFRCR SERPLBLD CKD-EPI 2021: 26 ML/MIN/1.73
GLOBULIN SER CALC-MCNC: 2.6 G/DL (ref 1.5–4.5)
GLUCOSE SERPL-MCNC: 178 MG/DL (ref 65–99)
HDLC SERPL-MCNC: 39 MG/DL
LDLC SERPL CALC-MCNC: 56 MG/DL (ref 0–99)
POTASSIUM SERPL-SCNC: 5.2 MMOL/L (ref 3.5–5.2)
PROT SERPL-MCNC: 6.9 G/DL (ref 6–8.5)
SODIUM SERPL-SCNC: 141 MMOL/L (ref 134–144)
TRIGL SERPL-MCNC: 130 MG/DL (ref 0–149)
VLDLC SERPL CALC-MCNC: 23 MG/DL (ref 5–40)

## 2022-05-16 ENCOUNTER — TELEPHONE (OUTPATIENT)
Dept: FAMILY MEDICINE CLINIC | Facility: CLINIC | Age: 77
End: 2022-05-16

## 2022-05-16 DIAGNOSIS — I25.10 CORONARY ARTERY DISEASE INVOLVING NATIVE CORONARY ARTERY OF NATIVE HEART WITHOUT ANGINA PECTORIS: ICD-10-CM

## 2022-05-16 DIAGNOSIS — I10 ESSENTIAL HYPERTENSION: ICD-10-CM

## 2022-05-16 RX ORDER — CLOPIDOGREL BISULFATE 75 MG/1
TABLET ORAL
Qty: 90 TABLET | Refills: 3 | Status: SHIPPED | OUTPATIENT
Start: 2022-05-16 | End: 2022-06-01 | Stop reason: SDUPTHER

## 2022-05-16 RX ORDER — CARVEDILOL 25 MG/1
TABLET ORAL
Qty: 60 TABLET | Refills: 0 | Status: SHIPPED | OUTPATIENT
Start: 2022-05-16 | End: 2022-06-01 | Stop reason: SDUPTHER

## 2022-05-16 NOTE — TELEPHONE ENCOUNTER
Incoming Refill Request      Medication requested (name and dose):     BD ULTRA-FINE MINI PEN NEEDLE 31GUAGE X3/16'    Pharmacy where request should be sent:     [facility]    Additional details provided by patient:     90 DAY PRESCRIPTION REQUESTED    Best call back number:     [phone]    Does the patient have less than a 3 day supply:  [] Yes  [x] No    Smitha Bolivar  05/16/22, 14:25 EDT

## 2022-05-23 ENCOUNTER — TELEPHONE (OUTPATIENT)
Dept: FAMILY MEDICINE CLINIC | Facility: CLINIC | Age: 77
End: 2022-05-23

## 2022-05-23 NOTE — TELEPHONE ENCOUNTER
CLIFF PATIENT  Dai told patient to call for a prescription for her needles/syringe to be covered by insurance.   100 unit syringe 31G   [facility]

## 2022-05-24 RX ORDER — SYRING-NEEDL,DISP,INSUL,0.3 ML 31 G X1/4"
1 SYRINGE, EMPTY DISPOSABLE MISCELLANEOUS 2 TIMES DAILY
Qty: 100 EACH | Refills: 5 | Status: SHIPPED | OUTPATIENT
Start: 2022-05-24

## 2022-05-24 RX ORDER — SYRING-NEEDL,DISP,INSUL,0.3 ML 31 G X1/4"
100 SYRINGE, EMPTY DISPOSABLE MISCELLANEOUS DAILY PRN
COMMUNITY
End: 2022-05-24 | Stop reason: SDUPTHER

## 2022-05-31 ENCOUNTER — TELEPHONE (OUTPATIENT)
Dept: CARDIOLOGY | Facility: CLINIC | Age: 77
End: 2022-05-31

## 2022-05-31 NOTE — TELEPHONE ENCOUNTER
Attempted to call patient to fast for tomorrow so we could draw lipids, however unable to reach patient.

## 2022-06-01 ENCOUNTER — OFFICE VISIT (OUTPATIENT)
Dept: CARDIOLOGY | Facility: CLINIC | Age: 77
End: 2022-06-01

## 2022-06-01 VITALS
SYSTOLIC BLOOD PRESSURE: 124 MMHG | WEIGHT: 151 LBS | HEART RATE: 80 BPM | BODY MASS INDEX: 29.64 KG/M2 | DIASTOLIC BLOOD PRESSURE: 58 MMHG | RESPIRATION RATE: 15 BRPM | TEMPERATURE: 97.1 F | OXYGEN SATURATION: 91 % | HEIGHT: 60 IN

## 2022-06-01 DIAGNOSIS — I10 ESSENTIAL HYPERTENSION: ICD-10-CM

## 2022-06-01 DIAGNOSIS — I25.10 CORONARY ARTERY DISEASE INVOLVING NATIVE CORONARY ARTERY OF NATIVE HEART WITHOUT ANGINA PECTORIS: ICD-10-CM

## 2022-06-01 DIAGNOSIS — I73.9 PAD (PERIPHERAL ARTERY DISEASE): Primary | ICD-10-CM

## 2022-06-01 DIAGNOSIS — Z79.4 TYPE 2 DIABETES MELLITUS WITH HYPERGLYCEMIA, WITH LONG-TERM CURRENT USE OF INSULIN: ICD-10-CM

## 2022-06-01 DIAGNOSIS — E11.65 TYPE 2 DIABETES MELLITUS WITH HYPERGLYCEMIA, WITH LONG-TERM CURRENT USE OF INSULIN: ICD-10-CM

## 2022-06-01 DIAGNOSIS — Z72.0 TOBACCO ABUSE: ICD-10-CM

## 2022-06-01 DIAGNOSIS — E78.00 PURE HYPERCHOLESTEROLEMIA: ICD-10-CM

## 2022-06-01 PROCEDURE — 99214 OFFICE O/P EST MOD 30 MIN: CPT | Performed by: INTERNAL MEDICINE

## 2022-06-01 PROCEDURE — 93000 ELECTROCARDIOGRAM COMPLETE: CPT | Performed by: INTERNAL MEDICINE

## 2022-06-01 RX ORDER — AMLODIPINE BESYLATE 10 MG/1
10 TABLET ORAL DAILY
Qty: 90 TABLET | Refills: 3 | Status: SHIPPED | OUTPATIENT
Start: 2022-06-01

## 2022-06-01 RX ORDER — CLOPIDOGREL BISULFATE 75 MG/1
75 TABLET ORAL DAILY
Qty: 90 TABLET | Refills: 3 | Status: SHIPPED | OUTPATIENT
Start: 2022-06-01

## 2022-06-01 RX ORDER — FUROSEMIDE 20 MG/1
20 TABLET ORAL DAILY
Qty: 90 TABLET | Refills: 3 | Status: SHIPPED | OUTPATIENT
Start: 2022-06-01

## 2022-06-01 RX ORDER — CARVEDILOL 25 MG/1
25 TABLET ORAL 2 TIMES DAILY WITH MEALS
Qty: 180 TABLET | Refills: 3 | Status: SHIPPED | OUTPATIENT
Start: 2022-06-01

## 2022-06-01 RX ORDER — EZETIMIBE 10 MG/1
10 TABLET ORAL DAILY
Qty: 90 TABLET | Refills: 3 | Status: SHIPPED | OUTPATIENT
Start: 2022-06-01

## 2022-06-01 RX ORDER — CHLORTHALIDONE 25 MG/1
25 TABLET ORAL DAILY
Qty: 90 TABLET | Refills: 3 | Status: SHIPPED | OUTPATIENT
Start: 2022-06-01 | End: 2022-06-21

## 2022-06-01 RX ORDER — ATORVASTATIN CALCIUM 80 MG/1
80 TABLET, FILM COATED ORAL DAILY
Qty: 90 TABLET | Refills: 3 | Status: SHIPPED | OUTPATIENT
Start: 2022-06-01 | End: 2022-09-12 | Stop reason: SDUPTHER

## 2022-06-01 NOTE — PROGRESS NOTES
[facility] [place]  [facility] CARDIOLOGY  [address] [place] [address]  Dept: [phone]  Dept Fax: [phone]    Vivi Son  1945    Follow Up Office Visit Note    History of Present Illness:  Vivi Son is a 77 y.o. female who presents to the clinic for Follow-up. CAD-She seems doing well, denies any chest pain, has prior stents to LAD and RCA on ASA, Plavix, no complaints, still smoking     The following portions of the patient's history were reviewed and updated as appropriate: allergies, current medications, past family history, past medical history, past social history, past surgical history and problem list.    Medications:  albuterol  amLODIPine  aspirin  atorvastatin  carvedilol  chlorthalidone  clopidogrel  ezetimibe  ferrous sulfate  furosemide  gabapentin  glucose blood  insulin NPH-insulin regular  Insulin Pen Needle misc  Insulin Syringe-Needle U-100 misc  ipratropium-albuterol  levothyroxine  pantoprazole  True Metrix Air Glucose Meter kit  Vitamin C capsule  Vitamin D3 capsule    Subjective  Allergies   Allergen Reactions   • Cefuroxime GI Intolerance   • Lortab [Hydrocodone-Acetaminophen] Hallucinations   • Oxycontin [Oxycodone Hcl] Hallucinations   • Percocet [Oxycodone-Acetaminophen] Hallucinations        Past Medical History:   Diagnosis Date   • Anemia    • Anxiety    • Arthritis    • Aspiration of food     pill   • Bleeding ulcer    • Blood transfusion abn reaction or complication, no procedure mishap    • BMI 31.0-31.9,adult    • CAD (coronary artery disease)    • CHF (congestive heart failure) ([facility])    • Chronic kidney disease    • COPD (chronic obstructive pulmonary disease) ([facility])    • Cough    • Depression    • Diabetes mellitus type II, controlled ([facility])     DX 1985, FSBS 3-4 X DAY   • Dyslipidemia    • Edema    • Fever, unspecified    • GERD (gastroesophageal reflux disease)    • History of bronchitis    • History of pneumonia    • History of  transfusion    • HTN (hypertension)    • Hyperlipidemia    • Hypothyroidism    • Long term (current) use of insulin (HCC)    • MI (myocardial infarction) (HCC)    • Non-small cell carcinoma of right lung (HCC)    • Osteoarthrosis, wrist    • PUD (peptic ulcer disease)    • PVD (peripheral vascular disease) (HCC)    • Tremors of nervous system    • Wears dentures    • Wears glasses        Past Surgical History:   Procedure Laterality Date   • AORTAGRAM N/A 9/4/2018    Procedure: AORTAGRAM WITH RUNOFFS RIGHT COMMON ILLIAC AND DISTAL ABDOMINAL AORTA STENT;  Surgeon: Triston Floyd MD;  Location:  [facility];  Service: Vascular   • APPENDECTOMY  1978   • CARDIAC CATHETERIZATION     • CARPAL TUNNEL RELEASE Right    • COLONOSCOPY     • CORONARY ANGIOPLASTY WITH STENT PLACEMENT      [facility] - unknown physician name    • EAR TUBES     • EAR TUBES Left    • ENDOSCOPY     • FEMORAL ENDARTERECTOMY Right 9/21/2018    Procedure: FEMORAL ENDARTERECTOMY;  Surgeon: Triston Floyd MD;  Location:  [facility] OR;  Service: Vascular   • FEMORAL FEMORAL BYPASS Right 9/21/2018    Procedure: FEMORAL FEMORAL BYPASS RIGHT;  Surgeon: Triston Floyd MD;  Location:  [facility] OR;  Service: Vascular   • LUMBAR SPINE SURGERY     • LUNG LOBECTOMY Right     RUL    • SACROCOCCYGEAL ULCER REMOVAL     • VOCAL CORD BIOPSY         Family History   Problem Relation Age of Onset   • Diabetes insipidus Mother    • Diabetes Mother    • Kidney failure Mother    • Dementia Father    • Coronary artery disease Other         LESS THAN 60 YEARS OF AGE   • Coronary artery disease Brother         Social History     Socioeconomic History   • Marital status:    • Number of children: 2   Tobacco Use   • Smoking status: Current Every Day Smoker     Packs/day: 1.50     Years: 60.00     Pack years: 90.00     Types: Cigarettes   • Smokeless tobacco: Never Used   Vaping Use   • Vaping Use: Never used   Substance and Sexual Activity   •  "Alcohol use: No   • Drug use: No   • Sexual activity: Defer       Review of Systems   Constitutional: Negative.    HENT: Negative.    Respiratory: Negative.    Cardiovascular: Negative.    Endocrine: Negative.    Genitourinary: Negative.    Musculoskeletal: Negative.    Skin: Negative.    Allergic/Immunologic: Negative.    Neurological: Negative.    Hematological: Negative.    Psychiatric/Behavioral: Negative.        Cardiovascular Procedures    ECHO/MUGA:   STRESS TESTS:   CARDIAC CATH:   DEVICES:   HOLTER:   CT/MRI:   VASCULAR:   CARDIOTHORACIC:     Objective  Vitals:    06/01/22 1320   BP: 124/58   BP Location: Right arm   Patient Position: Sitting   Cuff Size: Adult   Pulse: 80   Resp: 15   Temp: 97.1 °F (36.2 °C)   TempSrc: Infrared   SpO2: 91%   Weight: 68.5 kg (151 lb)   Height: 152.4 cm (60\")   PainSc: 0-No pain     Body mass index is 29.49 kg/m².     Physical Exam  Constitutional:       Appearance: Healthy appearance. Not in distress.   Neck:      Vascular: No JVR. JVD normal.   Pulmonary:      Effort: Pulmonary effort is normal.      Breath sounds: Normal breath sounds. No wheezing. No rhonchi. No rales.   Chest:      Chest wall: Not tender to palpatation.   Cardiovascular:      PMI at left midclavicular line. Normal rate. Regular rhythm. Normal S1. Normal S2.      Murmurs: There is no murmur.      No gallop. No click. No rub.   Pulses:     Intact distal pulses.   Edema:     Peripheral edema absent.   Abdominal:      General: Bowel sounds are normal.      Palpations: Abdomen is soft.      Tenderness: There is no abdominal tenderness.   Musculoskeletal: Normal range of motion.         General: No tenderness. Skin:     General: Skin is warm and dry.   Neurological:      General: No focal deficit present.      Mental Status: Alert and oriented to person, place and time.          Diagnostic Data    ECG 12 Lead    Date/Time: 6/1/2022 1:53 PM  Performed by: Nimesh Lopez MD  Authorized by: Nimesh Lopez " MD Gui   Comparison: compared with previous ECG from 7/7/2021  Similar to previous ECG  Rate: normal  BPM: 69  QRS axis: normal    Clinical impression: normal ECG            Assessment and Plan  Diagnoses and all orders for this visit:    PAD (S/P Right Fem-Fem Bypass)- s/p surgery on ASA, Plavix,    Pure hypercholesterolemia- On Lipitor and Zetia, Lipids are good     Primary hypertension- the BP is 140.80 on amlodipine 10 mg, Chlorthalidone and lasix 20 mg, she has CRF and sees a nephrologist, we might need to hold in the future clorthalidone and increase Lasix, but her creatinine is steady 1,9     Coronary artery disease involving native coronary artery of native heart without angina pectoris- No chest pain, on ASA, plavix, prior stents toLAD and RCA   -     clopidogrel (PLAVIX) 75 MG tablet; Take 1 tablet by mouth Daily.  -     furosemide (LASIX) 20 MG tablet; Take 1 tablet by mouth Daily.    Tobacco abuse- Still smoking    Type 2 diabetes mellitus with hyperglycemia, with long-term current use of insulin ([facility])        Other orders  -     chlorthalidone (HYGROTON) 25 MG tablet; Take 1 tablet by mouth Daily.         Return in about 6 months (around 12/1/2022) for Recheck.    Nimesh Lopez MD  06/01/2022

## 2022-06-08 ENCOUNTER — APPOINTMENT (OUTPATIENT)
Dept: WOMENS IMAGING | Facility: HOSPITAL | Age: 77
End: 2022-06-08

## 2022-06-08 PROCEDURE — 77067 SCR MAMMO BI INCL CAD: CPT | Performed by: RADIOLOGY

## 2022-06-21 DIAGNOSIS — I10 ESSENTIAL HYPERTENSION: ICD-10-CM

## 2022-06-21 DIAGNOSIS — I25.10 CORONARY ARTERY DISEASE INVOLVING NATIVE CORONARY ARTERY OF NATIVE HEART WITHOUT ANGINA PECTORIS: ICD-10-CM

## 2022-06-21 RX ORDER — LEVOTHYROXINE SODIUM 0.03 MG/1
TABLET ORAL
Qty: 90 TABLET | Refills: 0 | Status: SHIPPED | OUTPATIENT
Start: 2022-06-21 | End: 2022-12-13

## 2022-06-21 RX ORDER — CHLORTHALIDONE 25 MG/1
TABLET ORAL
Qty: 90 TABLET | Refills: 3 | Status: SHIPPED | OUTPATIENT
Start: 2022-06-21

## 2022-06-21 RX ORDER — CALCIUM CITRATE/VITAMIN D3 200MG-6.25
TABLET ORAL
Qty: 100 EACH | Refills: 12 | Status: SHIPPED | OUTPATIENT
Start: 2022-06-21

## 2022-06-22 RX ORDER — FUROSEMIDE 20 MG/1
TABLET ORAL
Qty: 90 TABLET | Refills: 3 | OUTPATIENT
Start: 2022-06-22

## 2022-06-22 RX ORDER — CARVEDILOL 25 MG/1
TABLET ORAL
Qty: 120 TABLET | OUTPATIENT
Start: 2022-06-22

## 2022-07-26 DIAGNOSIS — E11.42 POLYNEUROPATHY DUE TO TYPE 2 DIABETES MELLITUS: ICD-10-CM

## 2022-07-26 NOTE — TELEPHONE ENCOUNTER
Caller: Vivi Son    Relationship: Self    Best call back number: [phone]     Requested Prescriptions:   Requested Prescriptions     Pending Prescriptions Disp Refills   • gabapentin (NEURONTIN) 300 MG capsule 90 capsule 2     Sig: Take 1 capsule by mouth 3 (Three) Times a Day.        Pharmacy where request should be sent: [facility] [address] [place] [address] [phone] [facility] [phone] FX     Additional details provided by patient: PATIENT ASKED IF SHE COULD HAVE A PARTIAL REFILL UNTIL HER APPOINTMENT 8-8-22.  PATIENT HAS ENOUGH UNTIL THE END OF THE WEEK.     Does the patient have less than a 3 day supply:  [] Yes  [x] No    Shayna Chappell Rep   07/26/22 08:49 EDT

## 2022-07-27 DIAGNOSIS — E11.42 POLYNEUROPATHY DUE TO TYPE 2 DIABETES MELLITUS: ICD-10-CM

## 2022-07-27 RX ORDER — GABAPENTIN 300 MG/1
300 CAPSULE ORAL 3 TIMES DAILY
Qty: 90 CAPSULE | Refills: 2 | Status: SHIPPED | OUTPATIENT
Start: 2022-07-27 | End: 2022-11-08 | Stop reason: SDUPTHER

## 2022-07-27 RX ORDER — GABAPENTIN 300 MG/1
300 CAPSULE ORAL 3 TIMES DAILY
Qty: 30 CAPSULE | Refills: 0 | Status: SHIPPED | OUTPATIENT
Start: 2022-07-27 | End: 2022-08-08

## 2022-07-27 RX ORDER — GABAPENTIN 300 MG/1
CAPSULE ORAL
Qty: 90 CAPSULE | OUTPATIENT
Start: 2022-07-27

## 2022-08-08 ENCOUNTER — OFFICE VISIT (OUTPATIENT)
Dept: FAMILY MEDICINE CLINIC | Facility: CLINIC | Age: 77
End: 2022-08-08

## 2022-08-08 VITALS
SYSTOLIC BLOOD PRESSURE: 160 MMHG | TEMPERATURE: 97.7 F | HEART RATE: 82 BPM | OXYGEN SATURATION: 91 % | HEIGHT: 60 IN | DIASTOLIC BLOOD PRESSURE: 78 MMHG | WEIGHT: 146.1 LBS | BODY MASS INDEX: 28.68 KG/M2

## 2022-08-08 DIAGNOSIS — C34.91 NON-SMALL CELL CARCINOMA OF RIGHT LUNG: ICD-10-CM

## 2022-08-08 DIAGNOSIS — I13.0 HYPERTENSIVE HEART AND KIDNEY DISEASE WITH HF AND WITH CKD STAGE IV: Primary | ICD-10-CM

## 2022-08-08 DIAGNOSIS — M54.50 ACUTE BILATERAL LOW BACK PAIN WITHOUT SCIATICA: ICD-10-CM

## 2022-08-08 DIAGNOSIS — Z79.4 TYPE 2 DIABETES MELLITUS WITH HYPERGLYCEMIA, WITH LONG-TERM CURRENT USE OF INSULIN: ICD-10-CM

## 2022-08-08 DIAGNOSIS — E03.9 ACQUIRED HYPOTHYROIDISM: ICD-10-CM

## 2022-08-08 DIAGNOSIS — N18.4 HYPERTENSIVE HEART AND KIDNEY DISEASE WITH HF AND WITH CKD STAGE IV: Primary | ICD-10-CM

## 2022-08-08 DIAGNOSIS — E11.65 TYPE 2 DIABETES MELLITUS WITH HYPERGLYCEMIA, WITH LONG-TERM CURRENT USE OF INSULIN: ICD-10-CM

## 2022-08-08 PROCEDURE — 99214 OFFICE O/P EST MOD 30 MIN: CPT | Performed by: FAMILY MEDICINE

## 2022-08-08 PROCEDURE — 36415 COLL VENOUS BLD VENIPUNCTURE: CPT | Performed by: FAMILY MEDICINE

## 2022-08-08 NOTE — ASSESSMENT & PLAN NOTE
Since more than 6 weeks have passed since she saw her oncologist I have instructed her to call today to ensure that they have ordered her follow-up chest imaging.  She was instructed to let me know if they have not and we will order a chest CT.

## 2022-08-08 NOTE — PROGRESS NOTES
Date: 2022   Patient Name: Vivi Son  : 1945   MRN: [id number]     Chief Complaint:    Chief Complaint   Patient presents with   • Diabetes       History of Present Illness: Vivi Son is a 77 y.o. female who is here today to follow up for Diabetes, hypertension, and spiculated lung nodule.  Unfortunately her A1c never resulted from [facility] from her last appointment so we do not have an updated A1c however her fasting blood sugar was 178.  She reports sugars remain stable at home.  Blood pressure is elevated in office today but she has not had her medications this morning.  She reports that blood pressures at home are normal.    Low-dose CT in May showed a concerning spiculated pulmonary nodule in the right lower lobe.  With her history of non-small cell lung cancer she was instructed to call her oncologist.  She reports that she spoke with Dr. Colón and Dr. Barrett and was supposed to have either a PET scan or CT with contrast of her chest performed but she reports she has not heard from them yet.    Today she also complains of low back pain around her SI joints.  She reports pain is made worse when coughing or changing position.  She denies radiculopathy or loss of bowel or bladder function.           Review of Systems:   Review of Systems   Constitutional: Negative for chills, fatigue and fever.   Respiratory: Negative for cough and shortness of breath.    Cardiovascular: Negative for chest pain and palpitations.   Gastrointestinal: Negative for abdominal pain, constipation, diarrhea, nausea and vomiting.   Musculoskeletal: Positive for back pain. Negative for myalgias.   Neurological: Positive for tremors. Negative for dizziness and headache.   Psychiatric/Behavioral: Negative for depressed mood. The patient is not nervous/anxious.        Past Medical History:   Past Medical History:   Diagnosis Date   • Anemia    • Anxiety    • Arthritis    • Aspiration of food     pill   • Bleeding  ulcer    • Blood transfusion abn reaction or complication, no procedure mishap    • BMI 31.0-31.9,adult    • CAD (coronary artery disease)    • CHF (congestive heart failure) (HCC)    • Chronic kidney disease    • COPD (chronic obstructive pulmonary disease) (HCC)    • Cough    • Depression    • Diabetes mellitus type II, controlled (HCC)     DX 1985, FSBS 3-4 X DAY   • Dyslipidemia    • Edema    • Fever, unspecified    • GERD (gastroesophageal reflux disease)    • History of bronchitis    • History of pneumonia    • History of transfusion    • HTN (hypertension)    • Hyperlipidemia    • Hypothyroidism    • Long term (current) use of insulin (HCC)    • Lung cancer (HCC) 2016   • MI (myocardial infarction) (HCC)    • Non-small cell carcinoma of right lung (HCC)    • Osteoarthrosis, wrist    • PUD (peptic ulcer disease)    • PVD (peripheral vascular disease) (HCC)    • Tremors of nervous system    • Wears dentures    • Wears glasses        Past Surgical History:   Past Surgical History:   Procedure Laterality Date   • AORTAGRAM N/A 9/4/2018    Procedure: AORTAGRAM WITH RUNOFFS RIGHT COMMON ILLIAC AND DISTAL ABDOMINAL AORTA STENT;  Surgeon: Triston Floyd MD;  Location: [facility] OR 15;  Service: Vascular   • APPENDECTOMY  1978   • CARDIAC CATHETERIZATION     • CARPAL TUNNEL RELEASE Right    • COLONOSCOPY     • CORONARY ANGIOPLASTY WITH STENT PLACEMENT      [facility] [facility] - unknown physician name    • EAR TUBES     • EAR TUBES Left    • ENDOSCOPY     • FEMORAL ENDARTERECTOMY Right 9/21/2018    Procedure: FEMORAL ENDARTERECTOMY;  Surgeon: Triston Floyd MD;  Location:  [facility];  Service: Vascular   • FEMORAL FEMORAL BYPASS Right 9/21/2018    Procedure: FEMORAL FEMORAL BYPASS RIGHT;  Surgeon: Triston Floyd MD;  Location:  [facility];  Service: Vascular   • LUMBAR SPINE SURGERY     • LUNG LOBECTOMY Right     RUL    • SACROCOCCYGEAL ULCER REMOVAL     • VOCAL CORD BIOPSY         Family History:    Family History   Problem Relation Age of Onset   • Diabetes insipidus Mother    • Diabetes Mother    • Kidney failure Mother    • Dementia Father    • Coronary artery disease Other         LESS THAN 60 YEARS OF AGE   • Coronary artery disease Brother        Social History:   Social History     Socioeconomic History   • Marital status:    • Number of children: 2   Tobacco Use   • Smoking status: Current Every Day Smoker     Packs/day: 1.50     Years: 60.00     Pack years: 90.00     Types: Cigarettes   • Smokeless tobacco: Never Used   Vaping Use   • Vaping Use: Never used   Substance and Sexual Activity   • Alcohol use: No   • Drug use: No   • Sexual activity: Defer       Medications:     Current Outpatient Medications:   •  albuterol (PROVENTIL) (2.5 MG/3ML) 0.083% nebulizer solution, Take 2.5 mg by nebulization Every 4 (Four) Hours As Needed for Wheezing., Disp: , Rfl:   •  amLODIPine (NORVASC) 10 MG tablet, Take 1 tablet by mouth Daily., Disp: 90 tablet, Rfl: 3  •  Ascorbic Acid (Vitamin C) 500 MG capsule, Take 1 capsule by mouth Daily., Disp: , Rfl:   •  aspirin 81 MG EC tablet, Take 1 tablet by mouth Daily., Disp: , Rfl:   •  atorvastatin (LIPITOR) 80 MG tablet, Take 1 tablet by mouth Daily., Disp: 90 tablet, Rfl: 3  •  Blood Glucose Monitoring Suppl (True Metrix Meter) w/Device kit, USE AS DIRECTED, Disp: 1 kit, Rfl: 0  •  carvedilol (COREG) 25 MG tablet, Take 1 tablet by mouth 2 (Two) Times a Day With Meals., Disp: 180 tablet, Rfl: 3  •  chlorthalidone (HYGROTON) 25 MG tablet, TAKE 1 TABLET EVERY DAY, Disp: 90 tablet, Rfl: 3  •  Cholecalciferol (VITAMIN D3) 1000 units capsule, Take 1,000 Units by mouth Daily., Disp: , Rfl:   •  clopidogrel (PLAVIX) 75 MG tablet, Take 1 tablet by mouth Daily., Disp: 90 tablet, Rfl: 3  •  ezetimibe (ZETIA) 10 MG tablet, Take 1 tablet by mouth Daily., Disp: 90 tablet, Rfl: 3  •  Ferrous Sulfate (IRON) 325 (65 Fe) MG tablet, Take 325 mg by mouth 2 (Two) Times a Day.,  "Disp: , Rfl:   •  furosemide (LASIX) 20 MG tablet, Take 1 tablet by mouth Daily., Disp: 90 tablet, Rfl: 3  •  gabapentin (NEURONTIN) 300 MG capsule, Take 1 capsule by mouth 3 (Three) Times a Day., Disp: 90 capsule, Rfl: 2  •  insulin NPH-insulin regular (humuLIN 70/30,novoLIN 70/30) (70-30) 100 UNIT/ML injection, Inject 30 Units under the skin into the appropriate area as directed 2 (Two) Times a Day With Meals. 30 units in AM and 24 units in PM, Disp: , Rfl:   •  Insulin Pen Needle 31G X 5 MM misc, 1 each 2 (Two) Times a Day., Disp: 100 each, Rfl: 6  •  Insulin Syringe-Needle U-100 31G X 1/4\" 1 ML misc, 1 each 2 (Two) Times a Day., Disp: 100 each, Rfl: 5  •  ipratropium-albuterol (DUO-NEB) 0.5-2.5 mg/3 ml nebulizer, Take 3 mL by nebulization Every 4 (Four) Hours As Needed for Wheezing., Disp: , Rfl:   •  levothyroxine (SYNTHROID, LEVOTHROID) 25 MCG tablet, TAKE 1 TABLET EVERY DAY, Disp: 90 tablet, Rfl: 0  •  pantoprazole (PROTONIX) 40 MG EC tablet, Take 1 tablet by mouth 2 (Two) Times a Day., Disp: 180 tablet, Rfl: 1  •  True Metrix Blood Glucose Test test strip, TEST BLOOD SUGAR EVERY DAY, Disp: 100 each, Rfl: 12    Allergies:   Allergies   Allergen Reactions   • Cefuroxime GI Intolerance   • Lortab [Hydrocodone-Acetaminophen] Hallucinations   • Oxycontin [Oxycodone Hcl] Hallucinations   • Percocet [Oxycodone-Acetaminophen] Hallucinations       PHQ-2 Total Score: 0   PHQ-9 Total Score: 0     Physical Exam:  Vital Signs:   Vitals:    08/08/22 0900   BP: 160/78   BP Location: Left arm   Patient Position: Sitting   Cuff Size: Adult   Pulse: 82   Temp: 97.7 °F (36.5 °C)   TempSrc: Temporal   SpO2: 91%   Weight: 66.3 kg (146 lb 1.6 oz)   Height: 152.4 cm (60\")     Body mass index is 28.53 kg/m².     Physical Exam  Vitals and nursing note reviewed.   Constitutional:       Comments: Chronically ill-appearing   HENT:      Head: Normocephalic and atraumatic.   Cardiovascular:      Rate and Rhythm: Normal rate and regular " rhythm.      Heart sounds: Normal heart sounds. No murmur heard.  Pulmonary:      Effort: Pulmonary effort is normal.      Breath sounds: Normal breath sounds. No wheezing.   Skin:     General: Skin is warm.   Neurological:      Mental Status: She is alert and oriented to person, place, and time. Mental status is at baseline.      Comments: Head tremor   Psychiatric:         Mood and Affect: Mood normal.         Behavior: Behavior normal.           Assessment/Plan:   Diagnoses and all orders for this visit:    1. 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  Continue current treatment regimen.  Regular aerobic exercise.  Renal condition will be reassessed in 3 months.      2. Non-small cell carcinoma of right lung (HCC)  Assessment & Plan:  Since more than 6 weeks have passed since she saw her oncologist I have instructed her to call today to ensure that they have ordered her follow-up chest imaging.  She was instructed to let me know if they have not and we will order a chest CT.      3. Acute bilateral low back pain without sciatica  Assessment & Plan:  Patient will have x-rays performed today    Orders:  -     XR Spine Lumbar 2 or 3 View; Future    4. Type 2 diabetes mellitus with hyperglycemia, with long-term current use of insulin ([facility])  Assessment & Plan:  Diabetes is unchanged.   Continue current treatment regimen.  Dietary recommendations for ADA diet.  Regular aerobic exercise.  Diabetes will be reassessed in 3 months.    Orders:  -     CBC Auto Differential; Future  -     Comprehensive Metabolic Panel; Future  -     Lipid Panel; Future  -     Hemoglobin A1c; Future  -     CBC Auto Differential  -     Comprehensive Metabolic Panel  -     Lipid Panel  -     Hemoglobin A1c    5. Acquired hypothyroidism  -     TSH; Future  -     TSH         Follow Up:   Return in about 3 months (around 11/8/2022) for Med  [place].    Claire Aabd, DO  [facility]

## 2022-08-09 LAB
ALBUMIN SERPL-MCNC: 4.6 G/DL (ref 3.7–4.7)
ALBUMIN/GLOB SERPL: 1.8 {RATIO} (ref 1.2–2.2)
ALP SERPL-CCNC: 102 IU/L (ref 44–121)
ALT SERPL-CCNC: 18 IU/L (ref 0–32)
AST SERPL-CCNC: 19 IU/L (ref 0–40)
BASOPHILS # BLD AUTO: 0.1 X10E3/UL (ref 0–0.2)
BASOPHILS NFR BLD AUTO: 1 %
BILIRUB SERPL-MCNC: <0.2 MG/DL (ref 0–1.2)
BUN SERPL-MCNC: 44 MG/DL (ref 8–27)
BUN/CREAT SERPL: 23 (ref 12–28)
CALCIUM SERPL-MCNC: 8.9 MG/DL (ref 8.7–10.3)
CHLORIDE SERPL-SCNC: 106 MMOL/L (ref 96–106)
CHOLEST SERPL-MCNC: 102 MG/DL (ref 100–199)
CO2 SERPL-SCNC: 20 MMOL/L (ref 20–29)
CREAT SERPL-MCNC: 1.91 MG/DL (ref 0.57–1)
EGFRCR SERPLBLD CKD-EPI 2021: 27 ML/MIN/1.73
EOSINOPHIL # BLD AUTO: 0.4 X10E3/UL (ref 0–0.4)
EOSINOPHIL NFR BLD AUTO: 4 %
ERYTHROCYTE [DISTWIDTH] IN BLOOD BY AUTOMATED COUNT: 12.7 % (ref 11.7–15.4)
GLOBULIN SER CALC-MCNC: 2.5 G/DL (ref 1.5–4.5)
GLUCOSE SERPL-MCNC: 183 MG/DL (ref 65–99)
HBA1C MFR BLD: 6.7 % (ref 4.8–5.6)
HCT VFR BLD AUTO: 36 % (ref 34–46.6)
HDLC SERPL-MCNC: 35 MG/DL
HGB BLD-MCNC: 11.7 G/DL (ref 11.1–15.9)
IMM GRANULOCYTES # BLD AUTO: 0 X10E3/UL (ref 0–0.1)
IMM GRANULOCYTES NFR BLD AUTO: 0 %
LDLC SERPL CALC-MCNC: 46 MG/DL (ref 0–99)
LYMPHOCYTES # BLD AUTO: 2.3 X10E3/UL (ref 0.7–3.1)
LYMPHOCYTES NFR BLD AUTO: 20 %
MCH RBC QN AUTO: 33.7 PG (ref 26.6–33)
MCHC RBC AUTO-ENTMCNC: 32.5 G/DL (ref 31.5–35.7)
MCV RBC AUTO: 104 FL (ref 79–97)
MONOCYTES # BLD AUTO: 0.9 X10E3/UL (ref 0.1–0.9)
MONOCYTES NFR BLD AUTO: 8 %
NEUTROPHILS # BLD AUTO: 7.8 X10E3/UL (ref 1.4–7)
NEUTROPHILS NFR BLD AUTO: 67 %
PLATELET # BLD AUTO: 235 X10E3/UL (ref 150–450)
POTASSIUM SERPL-SCNC: 5 MMOL/L (ref 3.5–5.2)
PROT SERPL-MCNC: 7.1 G/DL (ref 6–8.5)
RBC # BLD AUTO: 3.47 X10E6/UL (ref 3.77–5.28)
SODIUM SERPL-SCNC: 141 MMOL/L (ref 134–144)
TRIGL SERPL-MCNC: 115 MG/DL (ref 0–149)
TSH SERPL DL<=0.005 MIU/L-ACNC: 1.89 UIU/ML (ref 0.45–4.5)
VLDLC SERPL CALC-MCNC: 21 MG/DL (ref 5–40)
WBC # BLD AUTO: 11.5 X10E3/UL (ref 3.4–10.8)

## 2022-08-10 ENCOUNTER — TELEPHONE (OUTPATIENT)
Dept: FAMILY MEDICINE CLINIC | Facility: CLINIC | Age: 77
End: 2022-08-10

## 2022-09-01 PROBLEM — N18.4 HYPERTENSIVE HEART AND KIDNEY DISEASE WITH HF AND WITH CKD STAGE IV: Status: ACTIVE | Noted: 2022-05-06

## 2022-09-01 NOTE — TELEPHONE ENCOUNTER
Caller: Vivi Son    Relationship to patient: Self    Best call back number: [phone]    Patient is needing: PATIENT STATED THAT [facility] HAS NOT RECEIVED ANY RESULTS AND WOULD LIKE TO SEE IF WE COULD RESEND THEM AGAIN    PLEASE ADVISE

## 2022-09-12 ENCOUNTER — TELEPHONE (OUTPATIENT)
Dept: CARDIOLOGY | Facility: CLINIC | Age: 77
End: 2022-09-12

## 2022-09-12 DIAGNOSIS — E78.00 PURE HYPERCHOLESTEROLEMIA: ICD-10-CM

## 2022-09-12 RX ORDER — ATORVASTATIN CALCIUM 80 MG/1
80 TABLET, FILM COATED ORAL DAILY
Qty: 30 TABLET | Refills: 0 | Status: SHIPPED | OUTPATIENT
Start: 2022-09-12

## 2022-09-12 NOTE — TELEPHONE ENCOUNTER
Needs a short fill on atorvastatin (LIPITOR) 80 MG tablet    [phone]  - [phone] FX        Mail order won't get here till Friday

## 2022-09-13 DIAGNOSIS — E11.42 POLYNEUROPATHY DUE TO TYPE 2 DIABETES MELLITUS: ICD-10-CM

## 2022-09-13 NOTE — TELEPHONE ENCOUNTER
Caller: [facility] (NOW [facility]) - [place], [place] - [address] - [phone]  - [phone] FX    Relationship: Pharmacy    Best call back number: [phone] EXT [phone] JERRICA     Requested Prescriptions:   Requested Prescriptions     Pending Prescriptions Disp Refills   • gabapentin (NEURONTIN) 300 MG capsule 90 capsule 2     Sig: Take 1 capsule by mouth 3 (Three) Times a Day.        Pharmacy where request should be sent:  [facility]    Additional details provided by patient: PHARMACY STATES PATIENT HAS MOVED, MEDICATION SENT TO THE WRONG ADDRESS.  THEY NEED VERBAL VERIFICATION TO SENT TO CURRENT ADDRESS.  PLEASE CALL PHARMACY, DIRECT # GIVEN.      Does the patient have less than a 3 day supply:  [x] Yes  [] No    Brenda Kingsley   09/13/22 10:36 EDT

## 2022-09-16 RX ORDER — GABAPENTIN 300 MG/1
300 CAPSULE ORAL 3 TIMES DAILY
Qty: 90 CAPSULE | Refills: 2 | OUTPATIENT
Start: 2022-09-16

## 2022-09-16 RX ORDER — PANTOPRAZOLE SODIUM 40 MG/1
TABLET, DELAYED RELEASE ORAL
Qty: 180 TABLET | Refills: 1 | Status: SHIPPED | OUTPATIENT
Start: 2022-09-16

## 2022-10-17 ENCOUNTER — TELEPHONE (OUTPATIENT)
Dept: FAMILY MEDICINE CLINIC | Facility: CLINIC | Age: 77
End: 2022-10-17

## 2022-10-17 NOTE — TELEPHONE ENCOUNTER
Caller: Vivi Son    Relationship: Self    Best call back number: [phone]    What medication are you requesting: NEBULIZER MACHINE     What are your current symptoms: WHEEZING     How long have you been experiencing symptoms:     Have you had these symptoms before:    [x] Yes  [] No    Have you been treated for these symptoms before:   [x] Yes  [] No    If a prescription is needed, what is your preferred pharmacy and phone number: [address] [phone] [facility] [phone] FX     Additional notes:PATIENT STATES HER CURRENT MACHINE HAS STOPPED WORKING COMPLETELY.

## 2022-10-18 ENCOUNTER — TELEPHONE (OUTPATIENT)
Dept: FAMILY MEDICINE CLINIC | Facility: CLINIC | Age: 77
End: 2022-10-18

## 2022-10-18 DIAGNOSIS — J43.9 PULMONARY EMPHYSEMA, UNSPECIFIED EMPHYSEMA TYPE: ICD-10-CM

## 2022-10-18 DIAGNOSIS — C34.91 NON-SMALL CELL CARCINOMA OF RIGHT LUNG: Primary | ICD-10-CM

## 2022-10-18 RX ORDER — BLOOD-GLUCOSE METER
KIT MISCELLANEOUS
COMMUNITY
End: 2022-10-18 | Stop reason: SDUPTHER

## 2022-10-18 RX ORDER — BLOOD-GLUCOSE METER
1 KIT MISCELLANEOUS DAILY
Qty: 1 EACH | Refills: 0 | Status: SHIPPED | OUTPATIENT
Start: 2022-10-18 | End: 2022-10-20 | Stop reason: SDUPTHER

## 2022-10-18 NOTE — TELEPHONE ENCOUNTER
Pharmacy Name: [facility] [address] [place] [address] [place] [phone] [facility] [phone]      Pharmacy representative name: JO    Pharmacy representative phone number: [phone]  What medication are you calling in regards to: Nebulizer System All-In-One misc         What question does the pharmacy have: DOES NOT CARRY Nebulizer System All-In-One misc         Who is the provider that prescribed the medication: DR CORONADO     Additional notes: JO REPORTS THIS PRESCRIPTION SHOULD PROBABLY BE SENT A DIFFERENT LOCAL PHARMACY THAT CARRIES NEBULIZER SYSTEMS.

## 2022-10-19 NOTE — TELEPHONE ENCOUNTER
Caller: Huy Davin GROSSMAN    Relationship: Self    Best call back number: [phone]    What is the best time to reach you: ANYTIME     Who are you requesting to speak with (clinical staff, provider,  specific staff member): CLINICAL STAFF     Do you know the name of the person who called: THE PATIENT DAVIN  What was the call regarding: REPORTS THAT [facility] ADVISED HER THAT THEY DO NOT CARRY NEUBULZER MACHINES AND IS REQUESTING A CALL BACK TO DISCUSS WHERE DR CORONADO MIGHT SEND A NEW REQUEST FOR THE NEUBULIZER MACHINE    Do you require a callback:YES, PLEASE CALL THE PATIENT AND ADVISE

## 2022-10-20 RX ORDER — BLOOD-GLUCOSE METER
1 KIT MISCELLANEOUS DAILY
Qty: 1 EACH | Refills: 0 | Status: SHIPPED | OUTPATIENT
Start: 2022-10-20

## 2022-11-03 ENCOUNTER — TRANSCRIBE ORDERS (OUTPATIENT)
Dept: CT IMAGING | Facility: CLINIC | Age: 77
End: 2022-11-03

## 2022-11-03 DIAGNOSIS — C34.31 MALIGNANT NEOPLASM OF LOWER LOBE OF RIGHT LUNG: Primary | ICD-10-CM

## 2022-11-08 ENCOUNTER — OFFICE VISIT (OUTPATIENT)
Dept: FAMILY MEDICINE CLINIC | Facility: CLINIC | Age: 77
End: 2022-11-08

## 2022-11-08 VITALS
SYSTOLIC BLOOD PRESSURE: 162 MMHG | TEMPERATURE: 96.9 F | HEIGHT: 60 IN | BODY MASS INDEX: 29.25 KG/M2 | OXYGEN SATURATION: 91 % | HEART RATE: 71 BPM | WEIGHT: 149 LBS | DIASTOLIC BLOOD PRESSURE: 84 MMHG

## 2022-11-08 DIAGNOSIS — E11.65 TYPE 2 DIABETES MELLITUS WITH HYPERGLYCEMIA, WITH LONG-TERM CURRENT USE OF INSULIN: ICD-10-CM

## 2022-11-08 DIAGNOSIS — Z79.4 TYPE 2 DIABETES MELLITUS WITH HYPERGLYCEMIA, WITH LONG-TERM CURRENT USE OF INSULIN: ICD-10-CM

## 2022-11-08 DIAGNOSIS — R25.2 MUSCLE CRAMPS AT NIGHT: Primary | ICD-10-CM

## 2022-11-08 DIAGNOSIS — N18.4 HYPERTENSIVE HEART AND KIDNEY DISEASE WITH HF AND WITH CKD STAGE IV: ICD-10-CM

## 2022-11-08 DIAGNOSIS — I13.0 HYPERTENSIVE HEART AND KIDNEY DISEASE WITH HF AND WITH CKD STAGE IV: ICD-10-CM

## 2022-11-08 DIAGNOSIS — E78.2 MIXED HYPERLIPIDEMIA: ICD-10-CM

## 2022-11-08 DIAGNOSIS — E11.42 POLYNEUROPATHY DUE TO TYPE 2 DIABETES MELLITUS: ICD-10-CM

## 2022-11-08 DIAGNOSIS — I10 PRIMARY HYPERTENSION: ICD-10-CM

## 2022-11-08 PROCEDURE — 99214 OFFICE O/P EST MOD 30 MIN: CPT | Performed by: FAMILY MEDICINE

## 2022-11-08 PROCEDURE — 36415 COLL VENOUS BLD VENIPUNCTURE: CPT | Performed by: FAMILY MEDICINE

## 2022-11-08 RX ORDER — METHOCARBAMOL 500 MG/1
TABLET, FILM COATED ORAL
Qty: 30 TABLET | Refills: 5 | Status: SHIPPED | OUTPATIENT
Start: 2022-11-08 | End: 2022-12-08 | Stop reason: SDUPTHER

## 2022-11-08 RX ORDER — GABAPENTIN 300 MG/1
300 CAPSULE ORAL 3 TIMES DAILY
Qty: 90 CAPSULE | Refills: 2 | Status: SHIPPED | OUTPATIENT
Start: 2022-11-08 | End: 2022-12-08 | Stop reason: SDUPTHER

## 2022-11-08 NOTE — ASSESSMENT & PLAN NOTE
Rx methocarbamol 500 mg 1/2 to 1 tablet 3 times daily as needed muscle spasm.  Side effects discussed including sedation

## 2022-11-08 NOTE — PROGRESS NOTES
Date: 2022   Patient Name: Vivi Son  : 1945   MRN: [id number]     Chief Complaint:    Chief Complaint   Patient presents with   • Leg Pain     Bilateral leg, discuss increasing gabapentin        History of Present Illness: Vivi Son is a 77 y.o. female who is here today to follow up for Hypertension and leg cramps.  She has not yet taken her blood pressure medications today and blood pressure is elevated in the office.  She does not regularly check her blood pressures at home but does report that they have been normal at recent doctor's visits.    She continues to have bilateral leg cramps worse at bedtime.  She reports her daughter gave her a muscle relaxer but she is unsure what the medication was.  When she took it she slept for 2 days straight.  She is currently taking gabapentin 300 mg 3 times daily and would like to discuss potentially increasing this or other medication options.           Review of Systems:   Review of Systems   Constitutional: Negative for chills, fatigue and fever.   Respiratory: Negative for cough and shortness of breath.    Cardiovascular: Negative for chest pain and palpitations.   Gastrointestinal: Negative for abdominal pain, constipation, diarrhea, nausea and vomiting.   Musculoskeletal: Positive for arthralgias, back pain, gait problem and myalgias.   Neurological: Negative for dizziness and headache.   Psychiatric/Behavioral: Negative for depressed mood. The patient is not nervous/anxious.        Past Medical History:   Past Medical History:   Diagnosis Date   • Anemia    • Anxiety    • Arthritis    • Aspiration of food     pill   • Bleeding ulcer    • Blood transfusion abn reaction or complication, no procedure mishap    • BMI 31.0-31.9,adult    • CAD (coronary artery disease)    • CHF (congestive heart failure) ([facility])    • Chronic kidney disease    • COPD (chronic obstructive pulmonary disease) ([facility])    • Cough    • Depression    • Diabetes mellitus type  II, controlled (HCC)     DX 1985, FSBS 3-4 X DAY   • Dyslipidemia    • Edema    • Fever, unspecified    • GERD (gastroesophageal reflux disease)    • History of bronchitis    • History of pneumonia    • History of transfusion    • HTN (hypertension)    • Hyperlipidemia    • Hypothyroidism    • Long term (current) use of insulin (HCC)    • Lung cancer (HCC) 2016   • MI (myocardial infarction) (HCC)    • Non-small cell carcinoma of right lung (HCC)    • Osteoarthrosis, wrist    • PUD (peptic ulcer disease)    • PVD (peripheral vascular disease) (HCC)    • Tremors of nervous system    • Wears dentures    • Wears glasses        Past Surgical History:   Past Surgical History:   Procedure Laterality Date   • AORTAGRAM N/A 9/4/2018    Procedure: AORTAGRAM WITH RUNOFFS RIGHT COMMON ILLIAC AND DISTAL ABDOMINAL AORTA STENT;  Surgeon: Triston Floyd MD;  Location:  [facility];  Service: Vascular   • APPENDECTOMY  1978   • CARDIAC CATHETERIZATION     • CARPAL TUNNEL RELEASE Right    • COLONOSCOPY     • CORONARY ANGIOPLASTY WITH STENT PLACEMENT      [facility] - unknown physician name    • EAR TUBES     • EAR TUBES Left    • ENDOSCOPY     • FEMORAL ENDARTERECTOMY Right 9/21/2018    Procedure: FEMORAL ENDARTERECTOMY;  Surgeon: Triston Floyd MD;  Location:  [facility] OR;  Service: Vascular   • FEMORAL FEMORAL BYPASS Right 9/21/2018    Procedure: FEMORAL FEMORAL BYPASS RIGHT;  Surgeon: Triston Floyd MD;  Location:  [facility] OR;  Service: Vascular   • LUMBAR SPINE SURGERY     • LUNG LOBECTOMY Right     RUL    • SACROCOCCYGEAL ULCER REMOVAL     • VOCAL CORD BIOPSY         Family History:   Family History   Problem Relation Age of Onset   • Diabetes insipidus Mother    • Diabetes Mother    • Kidney failure Mother    • Dementia Father    • Coronary artery disease Other         LESS THAN 60 YEARS OF AGE   • Coronary artery disease Brother        Social History:   Social History     Socioeconomic History   •  Marital status:    • Number of children: 2   Tobacco Use   • Smoking status: Every Day     Packs/day: 1.50     Years: 60.00     Pack years: 90.00     Types: Cigarettes   • Smokeless tobacco: Never   Vaping Use   • Vaping Use: Never used   Substance and Sexual Activity   • Alcohol use: No   • Drug use: No   • Sexual activity: Defer       Medications:     Current Outpatient Medications:   •  albuterol (PROVENTIL) (2.5 MG/3ML) 0.083% nebulizer solution, Take 2.5 mg by nebulization Every 4 (Four) Hours As Needed for Wheezing., Disp: , Rfl:   •  amLODIPine (NORVASC) 10 MG tablet, Take 1 tablet by mouth Daily., Disp: 90 tablet, Rfl: 3  •  Ascorbic Acid (Vitamin C) 500 MG capsule, Take 1 capsule by mouth Daily., Disp: , Rfl:   •  aspirin 81 MG EC tablet, Take 1 tablet by mouth Daily., Disp: , Rfl:   •  atorvastatin (LIPITOR) 80 MG tablet, Take 1 tablet by mouth Daily., Disp: 30 tablet, Rfl: 0  •  Blood Glucose Monitoring Suppl (True Metrix Meter) w/Device kit, USE AS DIRECTED, Disp: 1 kit, Rfl: 0  •  carvedilol (COREG) 25 MG tablet, Take 1 tablet by mouth 2 (Two) Times a Day With Meals., Disp: 180 tablet, Rfl: 3  •  chlorthalidone (HYGROTON) 25 MG tablet, TAKE 1 TABLET EVERY DAY, Disp: 90 tablet, Rfl: 3  •  Cholecalciferol (VITAMIN D3) 1000 units capsule, Take 1,000 Units by mouth Daily., Disp: , Rfl:   •  clopidogrel (PLAVIX) 75 MG tablet, Take 1 tablet by mouth Daily., Disp: 90 tablet, Rfl: 3  •  ezetimibe (ZETIA) 10 MG tablet, Take 1 tablet by mouth Daily., Disp: 90 tablet, Rfl: 3  •  Ferrous Sulfate (IRON) 325 (65 Fe) MG tablet, Take 325 mg by mouth 2 (Two) Times a Day., Disp: , Rfl:   •  furosemide (LASIX) 20 MG tablet, Take 1 tablet by mouth Daily., Disp: 90 tablet, Rfl: 3  •  gabapentin (NEURONTIN) 300 MG capsule, Take 1 capsule by mouth 3 (Three) Times a Day., Disp: 90 capsule, Rfl: 2  •  insulin NPH-insulin regular (humuLIN 70/30,novoLIN 70/30) (70-30) 100 UNIT/ML injection, Inject 30 Units under the skin  "into the appropriate area as directed 2 (Two) Times a Day With Meals. 30 units in AM and 24 units in PM, Disp: , Rfl:   •  Insulin Pen Needle 31G X 5 MM misc, 1 each 2 (Two) Times a Day., Disp: 100 each, Rfl: 6  •  Insulin Syringe-Needle U-100 31G X 1/4\" 1 ML misc, 1 each 2 (Two) Times a Day., Disp: 100 each, Rfl: 5  •  ipratropium-albuterol (DUO-NEB) 0.5-2.5 mg/3 ml nebulizer, Take 3 mL by nebulization Every 4 (Four) Hours As Needed for Wheezing., Disp: , Rfl:   •  levothyroxine (SYNTHROID, LEVOTHROID) 25 MCG tablet, TAKE 1 TABLET EVERY DAY, Disp: 90 tablet, Rfl: 0  •  Nebulizer System All-In-One misc, 1 each Daily., Disp: 1 each, Rfl: 0  •  pantoprazole (PROTONIX) 40 MG EC tablet, TAKE 1 TABLET TWICE DAILY, Disp: 180 tablet, Rfl: 1  •  True Metrix Blood Glucose Test test strip, TEST BLOOD SUGAR EVERY DAY, Disp: 100 each, Rfl: 12  •  methocarbamol (Robaxin) 500 MG tablet, 1/2-1 tab PO TID PRN muscle cramps, Disp: 30 tablet, Rfl: 5    Allergies:   Allergies   Allergen Reactions   • Cefuroxime GI Intolerance   • Lortab [Hydrocodone-Acetaminophen] Hallucinations   • Oxycontin [Oxycodone Hcl] Hallucinations   • Percocet [Oxycodone-Acetaminophen] Hallucinations       PHQ-2 Total Score: 0   PHQ-9 Total Score: 0     Physical Exam:  Vital Signs:   Vitals:    11/08/22 1042   BP: 162/84   BP Location: Left arm   Patient Position: Sitting   Cuff Size: Adult   Pulse: 71   Temp: 96.9 °F (36.1 °C)   TempSrc: Temporal   SpO2: 91%   Weight: 67.6 kg (149 lb)   Height: 152.4 cm (60\")     Body mass index is 29.1 kg/m².     Physical Exam  Vitals and nursing note reviewed.   Constitutional:       Comments: Chronically ill-appearing   Cardiovascular:      Rate and Rhythm: Normal rate and regular rhythm.      Heart sounds: Normal heart sounds. No murmur heard.  Pulmonary:      Effort: Pulmonary effort is normal.      Breath sounds: Normal breath sounds. No wheezing.   Neurological:      Mental Status: She is alert and oriented to person, " place, and time. Mental status is at baseline.   Psychiatric:         Mood and Affect: Mood normal.         Behavior: Behavior normal.           Assessment/Plan:   Diagnoses and all orders for this visit:    1. Muscle cramps at night (Primary)  Assessment & Plan:  Rx methocarbamol 500 mg 1/2 to 1 tablet 3 times daily as needed muscle spasm.  Side effects discussed including sedation    Orders:  -     methocarbamol (Robaxin) 500 MG tablet; 1/2-1 tab PO TID PRN muscle cramps  Dispense: 30 tablet; Refill: 5    2. Mixed hyperlipidemia  Assessment & Plan:  Lipid abnormalities are unchanged.  Nutritional counseling was provided. and I would like her to cut her atorvastatin in half to 40 mg daily and see if this helps with myalgias.  Her recent lipid panel was very well controlled so I think she has a room to decrease.  Lipids will be reassessed In 1 month.    Orders:  -     Lipid Panel; Future    3. Polyneuropathy due to type 2 diabetes mellitus ([facility])  Assessment & Plan:  Diabetes is improving with treatment.   Continue current treatment regimen.  Reminded to bring in blood sugar diary at next visit.  Discussed ways to avoid symptomatic hypoglycemia.  Diabetes will be reassessed in 1 month.    Gabapentin refilled.  Adverse effects discussed.  Avoid alcohol, driving, or operating machinery while taking medications.    Orders:  -     gabapentin (NEURONTIN) 300 MG capsule; Take 1 capsule by mouth 3 (Three) Times a Day.  Dispense: 90 capsule; Refill: 2    4. 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  Continue current treatment regimen.  Patient was encouraged to watch ambulatory blood pressures.  Renal function has been stable and she continues to follow with nephrology  Renal condition will be reassessed in 6 months.      5. Type 2 diabetes mellitus with hyperglycemia, with long-term current use of insulin ([facility])  Assessment & Plan:  Diabetes  is unchanged.   Continue current treatment regimen.  Dietary recommendations for ADA diet.  Regular aerobic exercise.  Diabetes will be reassessed in 6 months.    Orders:  -     Hemoglobin A1c; Future    6. Primary hypertension  Assessment & Plan:  Hypertension is unchanged.  Continue current treatment regimen.  Weight loss.  Regular aerobic exercise.  Stop smoking.  Blood pressure will be reassessed at the next regular appointment.    Orders:  -     CBC Auto Differential; Future  -     Comprehensive Metabolic Panel; Future         Follow Up:   Return in about 1 month (around 12/8/2022) for Med Recheck.    Claire Abad DO  [facility]

## 2022-11-08 NOTE — ASSESSMENT & PLAN NOTE
Lipid abnormalities are unchanged.  Nutritional counseling was provided. and I would like her to cut her atorvastatin in half to 40 mg daily and see if this helps with myalgias.  Her recent lipid panel was very well controlled so I think she has a room to decrease.  Lipids will be reassessed In 1 month.

## 2022-11-08 NOTE — ASSESSMENT & PLAN NOTE
Hypertension is unchanged.  Continue current treatment regimen.  Weight loss.  Regular aerobic exercise.  Stop smoking.  Blood pressure will be reassessed at the next regular appointment.

## 2022-11-08 NOTE — ASSESSMENT & PLAN NOTE
Renal condition is unchanged.  Continue current treatment regimen.  Patient was encouraged to watch ambulatory blood pressures.  Renal function has been stable and she continues to follow with nephrology  Renal condition will be reassessed in 6 months.

## 2022-11-09 LAB
ALBUMIN SERPL-MCNC: 4.2 G/DL (ref 3.7–4.7)
ALBUMIN/GLOB SERPL: 1.4 {RATIO} (ref 1.2–2.2)
ALP SERPL-CCNC: 109 IU/L (ref 44–121)
ALT SERPL-CCNC: 18 IU/L (ref 0–32)
AST SERPL-CCNC: 22 IU/L (ref 0–40)
BASOPHILS # BLD AUTO: 0.1 X10E3/UL (ref 0–0.2)
BASOPHILS NFR BLD AUTO: 1 %
BILIRUB SERPL-MCNC: <0.2 MG/DL (ref 0–1.2)
BUN SERPL-MCNC: 49 MG/DL (ref 8–27)
BUN/CREAT SERPL: 26 (ref 12–28)
CALCIUM SERPL-MCNC: 9.4 MG/DL (ref 8.7–10.3)
CHLORIDE SERPL-SCNC: 110 MMOL/L (ref 96–106)
CHOLEST SERPL-MCNC: 109 MG/DL (ref 100–199)
CO2 SERPL-SCNC: 15 MMOL/L (ref 20–29)
CREAT SERPL-MCNC: 1.88 MG/DL (ref 0.57–1)
EGFRCR SERPLBLD CKD-EPI 2021: 27 ML/MIN/1.73
EOSINOPHIL # BLD AUTO: 0.3 X10E3/UL (ref 0–0.4)
EOSINOPHIL NFR BLD AUTO: 3 %
ERYTHROCYTE [DISTWIDTH] IN BLOOD BY AUTOMATED COUNT: 12.3 % (ref 11.7–15.4)
GLOBULIN SER CALC-MCNC: 3 G/DL (ref 1.5–4.5)
GLUCOSE SERPL-MCNC: 134 MG/DL (ref 70–99)
HBA1C MFR BLD: 7 % (ref 4.8–5.6)
HCT VFR BLD AUTO: 33.3 % (ref 34–46.6)
HDLC SERPL-MCNC: 39 MG/DL
HGB BLD-MCNC: 11.2 G/DL (ref 11.1–15.9)
IMM GRANULOCYTES # BLD AUTO: 0 X10E3/UL (ref 0–0.1)
IMM GRANULOCYTES NFR BLD AUTO: 0 %
LDLC SERPL CALC-MCNC: 48 MG/DL (ref 0–99)
LYMPHOCYTES # BLD AUTO: 2.4 X10E3/UL (ref 0.7–3.1)
LYMPHOCYTES NFR BLD AUTO: 20 %
MCH RBC QN AUTO: 33.6 PG (ref 26.6–33)
MCHC RBC AUTO-ENTMCNC: 33.6 G/DL (ref 31.5–35.7)
MCV RBC AUTO: 100 FL (ref 79–97)
MONOCYTES # BLD AUTO: 0.7 X10E3/UL (ref 0.1–0.9)
MONOCYTES NFR BLD AUTO: 6 %
NEUTROPHILS # BLD AUTO: 8.2 X10E3/UL (ref 1.4–7)
NEUTROPHILS NFR BLD AUTO: 70 %
PLATELET # BLD AUTO: 267 X10E3/UL (ref 150–450)
POTASSIUM SERPL-SCNC: 5.9 MMOL/L (ref 3.5–5.2)
PROT SERPL-MCNC: 7.2 G/DL (ref 6–8.5)
RBC # BLD AUTO: 3.33 X10E6/UL (ref 3.77–5.28)
SODIUM SERPL-SCNC: 142 MMOL/L (ref 134–144)
TRIGL SERPL-MCNC: 125 MG/DL (ref 0–149)
VLDLC SERPL CALC-MCNC: 22 MG/DL (ref 5–40)
WBC # BLD AUTO: 11.6 X10E3/UL (ref 3.4–10.8)

## 2022-11-11 ENCOUNTER — TELEPHONE (OUTPATIENT)
Dept: FAMILY MEDICINE CLINIC | Facility: CLINIC | Age: 77
End: 2022-11-11

## 2022-11-11 NOTE — TELEPHONE ENCOUNTER
Caller: Vivi Son    Relationship: Self     Best call back number: [phone]     Who are you requesting to speak with (clinical staff, provider,  specific staff member): CLINICAL     What was the call regarding: PATIENT STATES [facility] NEEDS THE DIAGNOSIS  FOR THE PATIENT AND THEN THEY CAN REFILL HER ALBUTEROL NEBULIZER   SHE SAID SHE HAS COPD    SHE DOES NOT HAVE A NEBULIZER RIGHT NOW BECAUSE IT STOPPED WORKING   SHE SAID SHE HAS HAD THIS FOR 6 YEARS   SHE HAS SEVERAL BOXES OF THE SOLUTION     Do you require a callback:  YES

## 2022-11-15 ENCOUNTER — TELEPHONE (OUTPATIENT)
Dept: FAMILY MEDICINE CLINIC | Facility: CLINIC | Age: 77
End: 2022-11-15

## 2022-11-15 NOTE — TELEPHONE ENCOUNTER
Caller: Vivi Son    Relationship: Self    Best call back number: [phone]    PATIENT IS CALLING IN TO REMIND DR CORONADO THAT THE OXYGEN TANKS IN HER HOME NEED TO BE PICKED UP BECAUSE SHE IN NO LONGER USING THEM.  PLEASE CALL [facility] AND TELL THEM SHE NO LONGER NEEDS THE SERVICE.

## 2022-11-23 DIAGNOSIS — C34.91 NON-SMALL CELL CARCINOMA OF RIGHT LUNG: ICD-10-CM

## 2022-11-23 DIAGNOSIS — J43.9 PULMONARY EMPHYSEMA, UNSPECIFIED EMPHYSEMA TYPE: Primary | ICD-10-CM

## 2022-12-01 ENCOUNTER — OFFICE VISIT (OUTPATIENT)
Dept: CARDIOLOGY | Facility: CLINIC | Age: 77
End: 2022-12-01

## 2022-12-01 VITALS
TEMPERATURE: 98 F | DIASTOLIC BLOOD PRESSURE: 84 MMHG | HEIGHT: 60 IN | HEART RATE: 83 BPM | WEIGHT: 147 LBS | OXYGEN SATURATION: 99 % | SYSTOLIC BLOOD PRESSURE: 138 MMHG | BODY MASS INDEX: 28.86 KG/M2 | RESPIRATION RATE: 12 BRPM

## 2022-12-01 DIAGNOSIS — Z72.0 TOBACCO ABUSE: ICD-10-CM

## 2022-12-01 DIAGNOSIS — E78.00 PURE HYPERCHOLESTEROLEMIA: ICD-10-CM

## 2022-12-01 DIAGNOSIS — Z95.828 S/P FEMORAL-FEMORAL BYPASS SURGERY: ICD-10-CM

## 2022-12-01 DIAGNOSIS — I10 PRIMARY HYPERTENSION: ICD-10-CM

## 2022-12-01 DIAGNOSIS — I25.10 CORONARY ARTERY DISEASE INVOLVING NATIVE CORONARY ARTERY OF NATIVE HEART WITHOUT ANGINA PECTORIS: Primary | ICD-10-CM

## 2022-12-01 DIAGNOSIS — E11.9 TYPE 2 DIABETES MELLITUS WITHOUT COMPLICATION, WITHOUT LONG-TERM CURRENT USE OF INSULIN: ICD-10-CM

## 2022-12-01 PROCEDURE — 99214 OFFICE O/P EST MOD 30 MIN: CPT | Performed by: INTERNAL MEDICINE

## 2022-12-01 NOTE — PROGRESS NOTES
[facility] [place]  [facility] CARDIOLOGY  [address] [place] [address]  Dept: [phone]  Dept Fax: [phone]    Vivi Son  1945    Follow Up Office Visit Note    History of Present Illness:  Vivi Son is a 77 y.o. female who presents to the clinic for  CAD- She denies any chest pain has baseline SOB no changes, still smoking has COPD, prior stents to LASD and RCA, on ASA and Plavix    The following portions of the patient's history were reviewed and updated as appropriate: allergies, current medications, past family history, past medical history, past social history, past surgical history and problem list.    Medications:  albuterol  amLODIPine  aspirin  atorvastatin  carvedilol  chlorthalidone  clopidogrel  ezetimibe  ferrous sulfate  furosemide  gabapentin  insulin NPH-insulin regular  Insulin Pen Needle misc  Insulin Syringe-Needle U-100 misc  ipratropium-albuterol  levothyroxine  methocarbamol  Nebulizer System All-In-One misc  pantoprazole  True Metrix Blood Glucose Test strip  True Metrix Meter kit  Vitamin C capsule  Vitamin D3 capsule    Subjective  Allergies   Allergen Reactions   • Cefuroxime GI Intolerance   • Lortab [Hydrocodone-Acetaminophen] Hallucinations   • Oxycontin [Oxycodone Hcl] Hallucinations   • Percocet [Oxycodone-Acetaminophen] Hallucinations        Past Medical History:   Diagnosis Date   • Anemia    • Anxiety    • Arthritis    • Aspiration of food     pill   • Bleeding ulcer    • Blood transfusion abn reaction or complication, no procedure mishap    • BMI 31.0-31.9,adult    • CAD (coronary artery disease)    • CHF (congestive heart failure) ([facility])    • Chronic kidney disease    • COPD (chronic obstructive pulmonary disease) ([facility])    • Cough    • Depression    • Diabetes mellitus type II, controlled ([facility])     DX 1985, FSBS 3-4 X DAY   • Dyslipidemia    • Edema    • Fever, unspecified    • GERD (gastroesophageal reflux disease)    • History of  bronchitis    • History of pneumonia    • History of transfusion    • HTN (hypertension)    • Hyperlipidemia    • Hypothyroidism    • Long term (current) use of insulin (HCC)    • Lung cancer (HCC) 2016   • MI (myocardial infarction) (HCC)    • Non-small cell carcinoma of right lung (HCC)    • Osteoarthrosis, wrist    • PUD (peptic ulcer disease)    • PVD (peripheral vascular disease) (HCC)    • Tremors of nervous system    • Wears dentures    • Wears glasses        Past Surgical History:   Procedure Laterality Date   • AORTAGRAM N/A 9/4/2018    Procedure: AORTAGRAM WITH RUNOFFS RIGHT COMMON ILLIAC AND DISTAL ABDOMINAL AORTA STENT;  Surgeon: Triston Floyd MD;  Location:  [facility];  Service: Vascular   • APPENDECTOMY  1978   • CARDIAC CATHETERIZATION     • CARPAL TUNNEL RELEASE Right    • COLONOSCOPY     • CORONARY ANGIOPLASTY WITH STENT PLACEMENT      [facility] - unknown physician name    • EAR TUBES     • EAR TUBES Left    • ENDOSCOPY     • FEMORAL ENDARTERECTOMY Right 9/21/2018    Procedure: FEMORAL ENDARTERECTOMY;  Surgeon: Triston Floyd MD;  Location:  [facility];  Service: Vascular   • FEMORAL FEMORAL BYPASS Right 9/21/2018    Procedure: FEMORAL FEMORAL BYPASS RIGHT;  Surgeon: Triston Floyd MD;  Location:  [facility];  Service: Vascular   • LUMBAR SPINE SURGERY     • LUNG LOBECTOMY Right     RUL    • SACROCOCCYGEAL ULCER REMOVAL     • VOCAL CORD BIOPSY         Family History   Problem Relation Age of Onset   • Diabetes insipidus Mother    • Diabetes Mother    • Kidney failure Mother    • Dementia Father    • Coronary artery disease Other         LESS THAN 60 YEARS OF AGE   • Coronary artery disease Brother         Social History     Socioeconomic History   • Marital status:    • Number of children: 2   Tobacco Use   • Smoking status: Every Day     Packs/day: 1.50     Years: 60.00     Pack years: 90.00     Types: Cigarettes   • Smokeless tobacco: Never   Vaping Use   •  "Vaping Use: Never used   Substance and Sexual Activity   • Alcohol use: No   • Drug use: No   • Sexual activity: Defer       Review of Systems   Constitutional: Negative.    HENT: Negative.    Respiratory: Positive for shortness of breath.    Cardiovascular: Negative.    Endocrine: Negative.    Genitourinary: Negative.    Musculoskeletal: Negative.    Skin: Negative.    Allergic/Immunologic: Negative.    Neurological: Negative.    Hematological: Negative.    Psychiatric/Behavioral: Negative.        Cardiovascular Procedures    ECHO/MUGA:   STRESS TESTS:   CARDIAC CATH:   DEVICES:   HOLTER:   CT/MRI:   VASCULAR:   CARDIOTHORACIC:     Objective  Vitals:    12/01/22 1324   BP: 138/84   BP Location: Right arm   Patient Position: Sitting   Cuff Size: Adult   Pulse: 83   Resp: 12   Temp: 98 °F (36.7 °C)   TempSrc: Infrared   SpO2: 99%   Weight: 66.7 kg (147 lb)   Height: 152.4 cm (60\")   PainSc: 0-No pain     Body mass index is 28.71 kg/m².     Physical Exam  Vitals reviewed.   Constitutional:       Appearance: Healthy appearance. Not in distress.   Neck:      Vascular: No JVR. JVD normal.   Pulmonary:      Effort: Pulmonary effort is normal.      Breath sounds: Normal breath sounds. No wheezing. No rhonchi. No rales.   Chest:      Chest wall: Not tender to palpatation.   Cardiovascular:      PMI at left midclavicular line. Normal rate. Regular rhythm. Normal S1. Normal S2.      Murmurs: There is no murmur.      No gallop. No click. No rub.   Pulses:     Intact distal pulses.   Edema:     Peripheral edema absent.   Abdominal:      General: Bowel sounds are normal.      Palpations: Abdomen is soft.      Tenderness: There is no abdominal tenderness.   Musculoskeletal: Normal range of motion.         General: No tenderness. Skin:     General: Skin is warm and dry.   Neurological:      General: No focal deficit present.      Mental Status: Alert and oriented to person, place and time.          Diagnostic " Data  Procedures    Assessment and Plan  Diagnoses and all orders for this visit:    Coronary artery disease involving native coronary artery of native heart without angina pectoris- No chest pain, on ASA and Plavix, prior stents to LAD and RCA    Primary hypertension- BP is fine 130.80,On Amlodipine 10 mg and Clorthalidone    S/P femoral-femoral bypass surgery- no major claudications    Pure hypercholesterolemia- Lipids are good on Lipitor 40 mg and also zetia    Type 2 diabetes mellitus without complication, without long-term current use of insulin ([facility])    Tobacco abuse- Still smoking          Return in about 6 months (around 6/1/2023) for Recheck.    Nimesh Lopez MD  12/01/2022

## 2022-12-08 ENCOUNTER — OFFICE VISIT (OUTPATIENT)
Dept: FAMILY MEDICINE CLINIC | Facility: CLINIC | Age: 77
End: 2022-12-08

## 2022-12-08 VITALS
WEIGHT: 145.2 LBS | DIASTOLIC BLOOD PRESSURE: 68 MMHG | HEART RATE: 76 BPM | OXYGEN SATURATION: 95 % | HEIGHT: 60 IN | BODY MASS INDEX: 28.51 KG/M2 | SYSTOLIC BLOOD PRESSURE: 128 MMHG

## 2022-12-08 DIAGNOSIS — E78.2 MIXED HYPERLIPIDEMIA: ICD-10-CM

## 2022-12-08 DIAGNOSIS — E11.42 POLYNEUROPATHY DUE TO TYPE 2 DIABETES MELLITUS: ICD-10-CM

## 2022-12-08 DIAGNOSIS — E11.65 TYPE 2 DIABETES MELLITUS WITH HYPERGLYCEMIA, WITH LONG-TERM CURRENT USE OF INSULIN: Primary | ICD-10-CM

## 2022-12-08 DIAGNOSIS — R25.2 MUSCLE CRAMPS AT NIGHT: ICD-10-CM

## 2022-12-08 DIAGNOSIS — Z79.4 TYPE 2 DIABETES MELLITUS WITH HYPERGLYCEMIA, WITH LONG-TERM CURRENT USE OF INSULIN: Primary | ICD-10-CM

## 2022-12-08 PROCEDURE — 99214 OFFICE O/P EST MOD 30 MIN: CPT | Performed by: FAMILY MEDICINE

## 2022-12-08 PROCEDURE — 36415 COLL VENOUS BLD VENIPUNCTURE: CPT | Performed by: FAMILY MEDICINE

## 2022-12-08 RX ORDER — GABAPENTIN 300 MG/1
300 CAPSULE ORAL 3 TIMES DAILY
Qty: 90 CAPSULE | Refills: 5 | Status: SHIPPED | OUTPATIENT
Start: 2022-12-08

## 2022-12-08 RX ORDER — METHOCARBAMOL 500 MG/1
TABLET, FILM COATED ORAL
Qty: 90 TABLET | Refills: 5 | Status: SHIPPED | OUTPATIENT
Start: 2022-12-08

## 2022-12-08 NOTE — ASSESSMENT & PLAN NOTE
Diabetes is Stable.   Continue current treatment regimen.  Dietary recommendations for ADA diet.  Regular aerobic exercise.  Diabetes will be reassessed in 6 months.

## 2022-12-08 NOTE — PROGRESS NOTES
Date: 2022   Patient Name: Vivi Son  : 1945   MRN: [id number]     Chief Complaint:    Chief Complaint   Patient presents with   • Follow-up     Follow up on medication changes       History of Present Illness: Vivi Son is a 77 y.o. female who is here today to follow up for Diabetes and muscle cramps.  Most recent A1c was 7 which was up a little from her previous labs.  She is working on improving her diet for the holidays.  She denies any recent hypoglycemic episodes.    She reports muscle cramps are significantly improved after decreasing atorvastatin to 20 mg daily and using methocarbamol as needed.  She does report that a full tablet of methocarbamol makes her drowsy so she has just been taking half a tablet 1-2 times daily as needed.    She also requests refill of gabapentin to get her through to her next appointment.  She reports this continues to work well for diabetic neuropathy and denies side effects to the medication.           Review of Systems:   Review of Systems   Constitutional: Negative for chills, fatigue and fever.   Respiratory: Negative for cough and shortness of breath.    Cardiovascular: Negative for chest pain and palpitations.   Gastrointestinal: Negative for abdominal pain, constipation, diarrhea, nausea and vomiting.   Musculoskeletal: Negative for back pain and myalgias.   Neurological: Negative for dizziness and headache.   Psychiatric/Behavioral: Negative for depressed mood. The patient is not nervous/anxious.        Past Medical History:   Past Medical History:   Diagnosis Date   • Anemia    • Anxiety    • Arthritis    • Aspiration of food     pill   • Bleeding ulcer    • Blood transfusion abn reaction or complication, no procedure mishap    • BMI 31.0-31.9,adult    • CAD (coronary artery disease)    • CHF (congestive heart failure) ([facility])    • Chronic kidney disease    • COPD (chronic obstructive pulmonary disease) ([facility])    • Cough    • Depression    •  Diabetes mellitus type II, controlled (HCC)     DX 1985, FSBS 3-4 X DAY   • Dyslipidemia    • Edema    • Fever, unspecified    • GERD (gastroesophageal reflux disease)    • History of bronchitis    • History of pneumonia    • History of transfusion    • HTN (hypertension)    • Hyperlipidemia    • Hypothyroidism    • Long term (current) use of insulin (HCC)    • Lung cancer (HCC) 2016   • MI (myocardial infarction) (HCC)    • Non-small cell carcinoma of right lung (HCC)    • Osteoarthrosis, wrist    • PUD (peptic ulcer disease)    • PVD (peripheral vascular disease) (HCC)    • Tremors of nervous system    • Wears dentures    • Wears glasses        Past Surgical History:   Past Surgical History:   Procedure Laterality Date   • AORTAGRAM N/A 9/4/2018    Procedure: AORTAGRAM WITH RUNOFFS RIGHT COMMON ILLIAC AND DISTAL ABDOMINAL AORTA STENT;  Surgeon: Triston Floyd MD;  Location:  [facility];  Service: Vascular   • APPENDECTOMY  1978   • CARDIAC CATHETERIZATION     • CARPAL TUNNEL RELEASE Right    • COLONOSCOPY     • CORONARY ANGIOPLASTY WITH STENT PLACEMENT      [facility] - unknown physician name    • EAR TUBES     • EAR TUBES Left    • ENDOSCOPY     • FEMORAL ENDARTERECTOMY Right 9/21/2018    Procedure: FEMORAL ENDARTERECTOMY;  Surgeon: Triston Floyd MD;  Location:  [facility];  Service: Vascular   • FEMORAL FEMORAL BYPASS Right 9/21/2018    Procedure: FEMORAL FEMORAL BYPASS RIGHT;  Surgeon: Triston Floyd MD;  Location:  [facility];  Service: Vascular   • LUMBAR SPINE SURGERY     • LUNG LOBECTOMY Right     RUL    • SACROCOCCYGEAL ULCER REMOVAL     • VOCAL CORD BIOPSY         Family History:   Family History   Problem Relation Age of Onset   • Diabetes insipidus Mother    • Diabetes Mother    • Kidney failure Mother    • Dementia Father    • Coronary artery disease Other         LESS THAN 60 YEARS OF AGE   • Coronary artery disease Brother        Social History:   Social History      Socioeconomic History   • Marital status:    • Number of children: 2   Tobacco Use   • Smoking status: Every Day     Packs/day: 1.50     Years: 60.00     Pack years: 90.00     Types: Cigarettes   • Smokeless tobacco: Never   Vaping Use   • Vaping Use: Never used   Substance and Sexual Activity   • Alcohol use: No   • Drug use: No   • Sexual activity: Defer       Medications:     Current Outpatient Medications:   •  albuterol (PROVENTIL) (2.5 MG/3ML) 0.083% nebulizer solution, Take 2.5 mg by nebulization Every 4 (Four) Hours As Needed for Wheezing., Disp: , Rfl:   •  amLODIPine (NORVASC) 10 MG tablet, Take 1 tablet by mouth Daily., Disp: 90 tablet, Rfl: 3  •  Ascorbic Acid (Vitamin C) 500 MG capsule, Take 1 capsule by mouth Daily., Disp: , Rfl:   •  aspirin 81 MG EC tablet, Take 1 tablet by mouth Daily., Disp: , Rfl:   •  atorvastatin (LIPITOR) 80 MG tablet, Take 1 tablet by mouth Daily. (Patient taking differently: Take 80 mg by mouth Daily. Taking 1/2 a pill per pcp), Disp: 30 tablet, Rfl: 0  •  Blood Glucose Monitoring Suppl (True Metrix Meter) w/Device kit, USE AS DIRECTED, Disp: 1 kit, Rfl: 0  •  carvedilol (COREG) 25 MG tablet, Take 1 tablet by mouth 2 (Two) Times a Day With Meals., Disp: 180 tablet, Rfl: 3  •  chlorthalidone (HYGROTON) 25 MG tablet, TAKE 1 TABLET EVERY DAY, Disp: 90 tablet, Rfl: 3  •  Cholecalciferol (VITAMIN D3) 1000 units capsule, Take 1,000 Units by mouth Daily., Disp: , Rfl:   •  clopidogrel (PLAVIX) 75 MG tablet, Take 1 tablet by mouth Daily., Disp: 90 tablet, Rfl: 3  •  ezetimibe (ZETIA) 10 MG tablet, Take 1 tablet by mouth Daily., Disp: 90 tablet, Rfl: 3  •  Ferrous Sulfate (IRON) 325 (65 Fe) MG tablet, Take 325 mg by mouth 2 (Two) Times a Day., Disp: , Rfl:   •  furosemide (LASIX) 20 MG tablet, Take 1 tablet by mouth Daily., Disp: 90 tablet, Rfl: 3  •  gabapentin (NEURONTIN) 300 MG capsule, Take 1 capsule by mouth 3 (Three) Times a Day., Disp: 90 capsule, Rfl: 5  •  insulin  "NPH-insulin regular (humuLIN 70/30,novoLIN 70/30) (70-30) 100 UNIT/ML injection, Inject 30 Units under the skin into the appropriate area as directed 2 (Two) Times a Day With Meals. 30 units in AM and 24 units in PM, Disp: , Rfl:   •  Insulin Syringe-Needle U-100 31G X 1/4\" 1 ML misc, 1 each 2 (Two) Times a Day., Disp: 100 each, Rfl: 5  •  ipratropium-albuterol (DUO-NEB) 0.5-2.5 mg/3 ml nebulizer, Take 3 mL by nebulization Every 4 (Four) Hours As Needed for Wheezing., Disp: , Rfl:   •  levothyroxine (SYNTHROID, LEVOTHROID) 25 MCG tablet, TAKE 1 TABLET EVERY DAY, Disp: 90 tablet, Rfl: 0  •  methocarbamol (Robaxin) 500 MG tablet, 1/2-1 tab PO TID PRN muscle cramps, Disp: 90 tablet, Rfl: 5  •  Nebulizer System All-In-One misc, 1 each Daily., Disp: 1 each, Rfl: 0  •  pantoprazole (PROTONIX) 40 MG EC tablet, TAKE 1 TABLET TWICE DAILY, Disp: 180 tablet, Rfl: 1  •  True Metrix Blood Glucose Test test strip, TEST BLOOD SUGAR EVERY DAY, Disp: 100 each, Rfl: 12    Allergies:   Allergies   Allergen Reactions   • Cefuroxime GI Intolerance   • Lortab [Hydrocodone-Acetaminophen] Hallucinations   • Oxycontin [Oxycodone Hcl] Hallucinations   • Percocet [Oxycodone-Acetaminophen] Hallucinations       PHQ-2 Total Score:     PHQ-9 Total Score:       Physical Exam:  Vital Signs:   Vitals:    12/08/22 0904   BP: 128/68   Pulse: 76   SpO2: 95%   Weight: 65.9 kg (145 lb 3.2 oz)   Height: 152.4 cm (60\")     Body mass index is 28.36 kg/m².     Physical Exam  Vitals and nursing note reviewed.   Constitutional:       Comments: Chronically ill-appearing   Cardiovascular:      Rate and Rhythm: Normal rate and regular rhythm.      Heart sounds: Normal heart sounds. No murmur heard.  Pulmonary:      Effort: Pulmonary effort is normal.      Breath sounds: Normal breath sounds. No wheezing.   Neurological:      Mental Status: She is alert and oriented to person, place, and time. Mental status is at baseline.      Comments: Head tremor   Psychiatric: "         Mood and Affect: Mood normal.         Behavior: Behavior normal.           Assessment/Plan:   Diagnoses and all orders for this visit:    1. Type 2 diabetes mellitus with hyperglycemia, with long-term current use of insulin (HCC) (Primary)  Assessment & Plan:  Diabetes is Stable.   Continue current treatment regimen.  Dietary recommendations for ADA diet.  Regular aerobic exercise.  Diabetes will be reassessed in 6 months.    Orders:  -     Hemoglobin A1c; Future    2. Mixed hyperlipidemia  Assessment & Plan:  Lipid abnormalities are Stable, we will repeat blood work today after decreasing medication to ensure lipid panel is still well controlled..  Nutritional counseling was provided. and Pharmacotherapy as ordered.  Lipids will be reassessed in 6 months.    Orders:  -     Lipid Panel; Future    3. Muscle cramps at night  Assessment & Plan:  Improved after decreasing statin and starting methocarbamol as needed    Orders:  -     methocarbamol (Robaxin) 500 MG tablet; 1/2-1 tab PO TID PRN muscle cramps  Dispense: 90 tablet; Refill: 5    4. 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   Continue current treatment regimen.  Reminded to bring in blood sugar diary at next visit.  Discussed ways to avoid symptomatic hypoglycemia.  Diabetes will be reassessed in 1 month.    Gabapentin refilled.  Adverse effects discussed.  Avoid alcohol, driving, or operating machinery while taking medications.  Follow-up in 6 months    Orders:  -     gabapentin (NEURONTIN) 300 MG capsule; Take 1 capsule by mouth 3 (Three) Times a Day.  Dispense: 90 capsule; Refill: 5         Follow Up:   Return in about 6 months (around 6/8/2023) for Annual physical, Medicare Wellness.    Claire Abad DO  [facility]

## 2022-12-08 NOTE — ASSESSMENT & PLAN NOTE
Diabetes is improving with treatment.   Continue current treatment regimen.  Reminded to bring in blood sugar diary at next visit.  Discussed ways to avoid symptomatic hypoglycemia.  Diabetes will be reassessed in 1 month.    Gabapentin refilled.  Adverse effects discussed.  Avoid alcohol, driving, or operating machinery while taking medications.  Follow-up in 6 months

## 2022-12-08 NOTE — ASSESSMENT & PLAN NOTE
Lipid abnormalities are Stable, we will repeat blood work today after decreasing medication to ensure lipid panel is still well controlled..  Nutritional counseling was provided. and Pharmacotherapy as ordered.  Lipids will be reassessed in 6 months.

## 2022-12-09 LAB
CHOLEST SERPL-MCNC: 100 MG/DL (ref 100–199)
HBA1C MFR BLD: 7 % (ref 4.8–5.6)
HDLC SERPL-MCNC: 33 MG/DL
LDLC SERPL CALC-MCNC: 48 MG/DL (ref 0–99)
TRIGL SERPL-MCNC: 101 MG/DL (ref 0–149)
VLDLC SERPL CALC-MCNC: 19 MG/DL (ref 5–40)

## 2022-12-13 RX ORDER — LEVOTHYROXINE SODIUM 0.03 MG/1
TABLET ORAL
Qty: 90 TABLET | Refills: 0 | Status: SHIPPED | OUTPATIENT
Start: 2022-12-13

## 2022-12-20 ENCOUNTER — TELEPHONE (OUTPATIENT)
Dept: CARDIOLOGY | Facility: CLINIC | Age: 77
End: 2022-12-20

## 2022-12-20 NOTE — TELEPHONE ENCOUNTER
Caller: Vivi Son    Relationship to patient: Self    Best call back number: [phone]    Patient is needing: PT CHECKED BP THIS AFTERNOON AT ABOUT 1:00 AND IT WAS RUNNING AT 93/46. IT GENERALLY RUNS AT ABOUT 130-160/ 60-65. SHE IS WANTING TO KNOW IF SHE SHOULD DROP THE CLORTHALIDONE 25 MG, AND IF IT MAY BE CAUSING THE DROP IN BP. PLEASE ADVISE, THANK YOU.

## 2022-12-20 NOTE — TELEPHONE ENCOUNTER
"Spoke with Ms. Son and she advised that she was feeling tired and felt like she could pass out and took her BP.  It was 93/46 and she states usually runs about 130-160/60-65.  She laid down and took a nap and as we are speaking I am asking her to recheck her BP.  152/63 currently.  \"I have felt bad the last several mornings about 1 hour after taking my morning meds\".  She states she takes: coreg, chlorthalidone, lasix, and aspirin in the morning.  She is wanting to know if she should adjust one of the morning meds.  I advised I would discuss with Dr. Lopez and call her back.   "

## 2023-03-06 RX ORDER — DIPHENHYDRAMINE HCL 25 MG
TABLET ORAL
Qty: 1 KIT | Refills: 0 | Status: SHIPPED | OUTPATIENT
Start: 2023-03-06

## 2023-04-13 NOTE — ASSESSMENT & PLAN NOTE
Patient's (Body mass index is 30.39 kg/m².) indicates that they are obese (BMI >30) with health conditions that include hypertension, diabetes mellitus and dyslipidemias . Weight is unchanged. BMI is is above average; BMI management plan is completed. We discussed portion control and increasing exercise.    Star Wedge Flap Text: The defect edges were debeveled with a #15 scalpel blade.  Given the location of the defect, shape of the defect and the proximity to free margins a star wedge flap was deemed most appropriate.  Using a sterile surgical marker, an appropriate rotation flap was drawn incorporating the defect and placing the expected incisions within the relaxed skin tension lines where possible. The area thus outlined was incised deep to adipose tissue with a #15 scalpel blade.  The skin margins were undermined to an appropriate distance in all directions utilizing iris scissors.

## 2023-04-17 ENCOUNTER — OFFICE VISIT (OUTPATIENT)
Dept: FAMILY MEDICINE CLINIC | Facility: CLINIC | Age: 78
End: 2023-04-17
Payer: MEDICARE

## 2023-04-17 VITALS
SYSTOLIC BLOOD PRESSURE: 146 MMHG | HEART RATE: 69 BPM | DIASTOLIC BLOOD PRESSURE: 62 MMHG | OXYGEN SATURATION: 96 % | HEIGHT: 60 IN | WEIGHT: 139 LBS | TEMPERATURE: 97.4 F | RESPIRATION RATE: 19 BRPM | BODY MASS INDEX: 27.29 KG/M2

## 2023-04-17 DIAGNOSIS — R30.0 DYSURIA: Primary | ICD-10-CM

## 2023-04-17 DIAGNOSIS — N30.01 ACUTE CYSTITIS WITH HEMATURIA: ICD-10-CM

## 2023-04-17 LAB
BILIRUB BLD-MCNC: NEGATIVE MG/DL
CLARITY, POC: ABNORMAL
COLOR UR: YELLOW
EXPIRATION DATE: ABNORMAL
GLUCOSE UR STRIP-MCNC: NEGATIVE MG/DL
KETONES UR QL: NEGATIVE
LEUKOCYTE EST, POC: ABNORMAL
Lab: ABNORMAL
NITRITE UR-MCNC: NEGATIVE MG/ML
PH UR: 5.5 [PH] (ref 5–8)
PROT UR STRIP-MCNC: ABNORMAL MG/DL
RBC # UR STRIP: ABNORMAL /UL
SP GR UR: 1.02 (ref 1–1.03)
UROBILINOGEN UR QL: ABNORMAL

## 2023-04-17 RX ORDER — CIPROFLOXACIN 500 MG/1
500 TABLET, FILM COATED ORAL DAILY
Qty: 7 TABLET | Refills: 0 | Status: SHIPPED | OUTPATIENT
Start: 2023-04-17 | End: 2023-04-19 | Stop reason: SDUPTHER

## 2023-04-17 NOTE — PROGRESS NOTES
"Chief Complaint  Vaginitis (PT states that last week she was having discharge that smelled, states that she is still having some. Pt states that she has irritation in the vaginal area. PT stated that the pain has moved into her sides as well as her lower back area )    Subjective        Vivi Son presents to [facility]  History of Present Illness  Patient reports today secondary to having foul-smelling bloody vaginal discharge 1 time last week.  Patient reports the discharge stopped however she started to have discolored urine that also had an odor and some lower abdominal pain.  Patient reports having some discomfort with urination.  Patient denies any fever or chills.  Patient reports having chronic low back pain and denies any thing new.  Vaginitis        Objective   Vital Signs:  /62 (BP Location: Left arm, Patient Position: Sitting, Cuff Size: Adult)   Pulse 69   Temp 97.4 °F (36.3 °C) (Infrared)   Resp 19   Ht 152.4 cm (60\")   Wt 63 kg (139 lb)   SpO2 96% Comment: resting room air  BMI 27.15 kg/m²   Estimated body mass index is 27.15 kg/m² as calculated from the following:    Height as of this encounter: 152.4 cm (60\").    Weight as of this encounter: 63 kg (139 lb).             Physical Exam  Vitals and nursing note reviewed.   Constitutional:       General: She is not in acute distress.     Appearance: She is not ill-appearing.   HENT:      Mouth/Throat:      Pharynx: No oropharyngeal exudate.   Eyes:      Pupils: Pupils are equal, round, and reactive to light.   Cardiovascular:      Rate and Rhythm: Normal rate and regular rhythm.   Pulmonary:      Effort: Pulmonary effort is normal. No respiratory distress.   Abdominal:      Tenderness: There is no right CVA tenderness or left CVA tenderness.   Musculoskeletal:         General: Normal range of motion.   Skin:     General: Skin is warm and dry.   Psychiatric:         Mood and Affect: Mood normal.        Result " Review :                   Assessment and Plan   Diagnoses and all orders for this visit:    1. Dysuria (Primary)  Assessment & Plan:  Patient's urine positive for red blood cells and nitrates.  Patient prescribed antibiotic for possible relief.  Advised her to increase fluids as tolerated.  Recommended patient return to clinic for follow-up in 10 to 14 days secondary to hematuria she acknowledged understanding.  Discussed signs of worsening symptoms and advise ER should they occur patient knowledge understanding.    Orders:  -     POCT urinalysis dipstick, automated  -     Urine Culture - Urine, Urine, Clean Catch; Future  -     Urine Culture - Urine, Urine, Clean Catch    2. Acute cystitis with hematuria  Assessment & Plan:  See plan above    Orders:  -     POCT urinalysis dipstick, automated  -     Urine Culture - Urine, Urine, Clean Catch; Future  -     Urine Culture - Urine, Urine, Clean Catch  -     ciprofloxacin (Cipro) 500 MG tablet; Take 1 tablet by mouth Daily.  Dispense: 7 tablet; Refill: 0           Follow Up   No follow-ups on file.  Patient was given instructions and counseling regarding her condition or for health maintenance advice. Please see specific information pulled into the AVS if appropriate.

## 2023-04-17 NOTE — ASSESSMENT & PLAN NOTE
Patient's urine positive for red blood cells and nitrates.  Patient prescribed antibiotic for possible relief.  Advised her to increase fluids as tolerated.  Recommended patient return to clinic for follow-up in 10 to 14 days secondary to hematuria she acknowledged understanding.  Discussed signs of worsening symptoms and advise ER should they occur patient knowledge understanding.

## 2023-04-18 ENCOUNTER — TELEPHONE (OUTPATIENT)
Dept: FAMILY MEDICINE CLINIC | Facility: CLINIC | Age: 78
End: 2023-04-18
Payer: MEDICARE

## 2023-04-18 DIAGNOSIS — N30.01 ACUTE CYSTITIS WITH HEMATURIA: ICD-10-CM

## 2023-04-18 NOTE — TELEPHONE ENCOUNTER
ANTIBIOTIC SENT YESTERDAY TO THE WRONG PHARMACY, SENT TO [facility].  PLEASE RESEND TO     [facility]    CALL IF NEEDED

## 2023-04-19 ENCOUNTER — TELEPHONE (OUTPATIENT)
Dept: FAMILY MEDICINE CLINIC | Facility: CLINIC | Age: 78
End: 2023-04-19
Payer: MEDICARE

## 2023-04-19 LAB
BACTERIA UR CULT: NO GROWTH
BACTERIA UR CULT: NORMAL

## 2023-04-19 RX ORDER — CIPROFLOXACIN 500 MG/1
500 TABLET, FILM COATED ORAL DAILY
Qty: 7 TABLET | Refills: 0 | Status: SHIPPED | OUTPATIENT
Start: 2023-04-19

## 2023-04-19 NOTE — TELEPHONE ENCOUNTER
Caller: [facility] [address] [place] [address] [facility] - [phone]  - [phone]     Relationship to patient: Pharmacy [facility] call back number: [phone]    Patient is needing: PHARMACY CALLED AGAIN ASKING TO GET A VERBAL TO FILL PATIENTS ANTIBIOTIC THAT WAS SENT TO [facility].    STATED PATIENT WAS THERE AT THE PHARMACY.

## 2023-04-19 NOTE — TELEPHONE ENCOUNTER
[facility]    CALLED ABOUT QUESTIONS REGARDING CIRPOFLAOXACIN     WILL NEED NEW PRESCRIPTION     AND DOSAGE INFORMATION    CONTACT NUMBER FOR PHARMACY: [phone]

## 2023-04-19 NOTE — TELEPHONE ENCOUNTER
Caller: Vivi Son    Relationship: Self    Best call back number: [phone]    What is the best time to reach you: ANY    Who are you requesting to speak with (clinical staff, provider,  specific staff member): STAFF    What was the call regarding: THE PATIENT CALLING TO CHECK ON THE STATUS OF THIS REQUEST.    THE PATIENT WILL BE CONTACTING [employer] TO TRY AND OBTAIN THE PRESCRIPTION.     Do you require a callback: YES.     THANK YOU.

## 2023-04-19 NOTE — TELEPHONE ENCOUNTER
[facility] [address] [place] [address] [phone] [facility] [phone] FX    Relationship: Pharmacy    Best call back number: [phone]    Requested Prescriptions: ciprofloxacin (Cipro) 500 MG tablet  Requested Prescriptions      No prescriptions requested or ordered in this encounter        [facility]arm[facility] where request should be sent[facility] [address] [facility] [phone] [facility] [phone] FX     Last office visit with prescribing clinician: 12/8/2022   Last telemedicine visit with prescribing clinician: 5/1/2023   Next office visit with prescribing clinician: 6/13/2023     Additional details provided by patient:  IS THIS TAKEN TWICE A DAY, OR JUST ONCE?    Does the patient have less than a 3 day supply:  [] Yes  [] No    Would you like a call back once the refill request has been completed: [] Yes [] No    If the office needs to give you a call back, can they leave a voicemail: [] Yes [] No    Shayna Almonte Rep   04/19/23 13:38 EDT

## 2023-04-20 ENCOUNTER — TELEPHONE (OUTPATIENT)
Dept: FAMILY MEDICINE CLINIC | Facility: CLINIC | Age: 78
End: 2023-04-20
Payer: MEDICARE

## 2023-04-20 NOTE — TELEPHONE ENCOUNTER
Please call patient and advise her urine culture returned negative for bacteria so she does not have a urinary tract infection.  If she is still having symptoms I would like for her to return to clinic for reexamination.  If symptoms have worsened she needs to go to the ER.  If she cannot get an appointment she can come on Saturday

## 2023-05-01 ENCOUNTER — OFFICE VISIT (OUTPATIENT)
Dept: FAMILY MEDICINE CLINIC | Facility: CLINIC | Age: 78
End: 2023-05-01
Payer: MEDICARE

## 2023-05-01 VITALS
HEART RATE: 77 BPM | HEIGHT: 60 IN | DIASTOLIC BLOOD PRESSURE: 60 MMHG | OXYGEN SATURATION: 92 % | SYSTOLIC BLOOD PRESSURE: 126 MMHG | BODY MASS INDEX: 26.31 KG/M2 | WEIGHT: 134 LBS

## 2023-05-01 DIAGNOSIS — M19.032 PRIMARY OSTEOARTHRITIS OF BOTH WRISTS: ICD-10-CM

## 2023-05-01 DIAGNOSIS — M19.031 PRIMARY OSTEOARTHRITIS OF BOTH WRISTS: ICD-10-CM

## 2023-05-01 DIAGNOSIS — N30.01 ACUTE CYSTITIS WITH HEMATURIA: Primary | ICD-10-CM

## 2023-05-01 DIAGNOSIS — N30.01 HEMATURIA DUE TO ACUTE CYSTITIS: ICD-10-CM

## 2023-05-01 DIAGNOSIS — M54.16 ACUTE LEFT LUMBAR RADICULOPATHY: ICD-10-CM

## 2023-05-01 LAB
BILIRUB BLD-MCNC: NEGATIVE MG/DL
CLARITY, POC: CLEAR
COLOR UR: YELLOW
EXPIRATION DATE: ABNORMAL
GLUCOSE UR STRIP-MCNC: NEGATIVE MG/DL
KETONES UR QL: NEGATIVE
LEUKOCYTE EST, POC: NEGATIVE
Lab: ABNORMAL
NITRITE UR-MCNC: NEGATIVE MG/ML
PH UR: 5.5 [PH] (ref 5–8)
PROT UR STRIP-MCNC: ABNORMAL MG/DL
RBC # UR STRIP: NEGATIVE /UL
SP GR UR: 1.02 (ref 1–1.03)
UROBILINOGEN UR QL: ABNORMAL

## 2023-05-01 RX ORDER — TRIAMCINOLONE ACETONIDE 40 MG/ML
40 INJECTION, SUSPENSION INTRA-ARTICULAR; INTRAMUSCULAR ONCE
Status: COMPLETED | OUTPATIENT
Start: 2023-05-01 | End: 2023-05-01

## 2023-05-01 RX ADMIN — TRIAMCINOLONE ACETONIDE 40 MG: 40 INJECTION, SUSPENSION INTRA-ARTICULAR; INTRAMUSCULAR at 15:59

## 2023-05-01 NOTE — ASSESSMENT & PLAN NOTE
Patient negative for red blood cells in urine this date.  She also reports her symptoms have subsided.  Advised patient to increase fluids as tolerated we will continue to monitor.  Call if any problems arise

## 2023-05-01 NOTE — ASSESSMENT & PLAN NOTE
Patient provided with steroid injection this date.  Advised patient to stretch and take previously prescribed medication as needed.  Call if any problems arise

## 2023-05-01 NOTE — PROGRESS NOTES
"Chief Complaint  UTI follow-up and Right hip pain    Subjective        Vivi Son presents to [facility]  History of Present Illness  Patient reports today for follow-up secondary to having UTI last week with some blood in her urine.  Patient reports she has been taking an antibiotic daily and her symptoms of urinary frequency and dysuria have subsided.      Patient also reports having an arthritis flare.  Reports pain in her wrist and lower back that is going on her left leg.  Patient states she has a muscle relaxer however it makes her tired.  Patient reports has been taking Tylenol with no relief and takes gabapentin daily.      Objective   Vital Signs:  /60 (BP Location: Right arm, Patient Position: Sitting, Cuff Size: Adult)   Pulse 77   Ht 152.4 cm (60\")   Wt 60.8 kg (134 lb)   SpO2 92%   BMI 26.17 kg/m²   Estimated body mass index is 26.17 kg/m² as calculated from the following:    Height as of this encounter: 152.4 cm (60\").    Weight as of this encounter: 60.8 kg (134 lb).             Physical Exam  Vitals and nursing note reviewed.   Constitutional:       General: She is not in acute distress.     Appearance: She is not ill-appearing.   HENT:      Mouth/Throat:      Pharynx: No oropharyngeal exudate.   Eyes:      Pupils: Pupils are equal, round, and reactive to light.   Cardiovascular:      Rate and Rhythm: Normal rate and regular rhythm.   Pulmonary:      Effort: Pulmonary effort is normal. No respiratory distress.   Abdominal:      Tenderness: There is no right CVA tenderness or left CVA tenderness.   Musculoskeletal:         General: Normal range of motion.   Skin:     General: Skin is warm and dry.   Psychiatric:         Mood and Affect: Mood normal.        Result Review :                   Assessment and Plan   Diagnoses and all orders for this visit:    1. Acute cystitis with hematuria (Primary)  Assessment & Plan:  Patient negative for red blood cells in " urine this date.  She also reports her symptoms have subsided.  Advised patient to increase fluids as tolerated we will continue to monitor.  Call if any problems arise      2. Primary osteoarthritis of both wrists  Assessment & Plan:  Patient prescribed diclofenac gel      3. Acute left lumbar radiculopathy  Assessment & Plan:  Patient provided with steroid injection this date.  Advised patient to stretch and take previously prescribed medication as needed.  Call if any problems arise    Orders:  -     triamcinolone acetonide (KENALOG-40) injection 40 mg  -     Diclofenac Sodium (VOLTAREN) 1 % gel gel; Apply 4 g topically to the appropriate area as directed 4 (Four) Times a Day As Needed (joint pain).  Dispense: 100 g; Refill: 5    4. Hematuria due to acute cystitis  -     POCT urinalysis dipstick, automated           Follow Up   Return if symptoms worsen or fail to improve.  Patient was given instructions and counseling regarding her condition or for health maintenance advice. Please see specific information pulled into the AVS if appropriate.

## 2023-05-15 DIAGNOSIS — I25.10 CORONARY ARTERY DISEASE INVOLVING NATIVE CORONARY ARTERY OF NATIVE HEART WITHOUT ANGINA PECTORIS: ICD-10-CM

## 2023-05-15 DIAGNOSIS — E78.00 PURE HYPERCHOLESTEROLEMIA: ICD-10-CM

## 2023-05-15 DIAGNOSIS — I10 ESSENTIAL HYPERTENSION: ICD-10-CM

## 2023-05-16 RX ORDER — EZETIMIBE 10 MG/1
TABLET ORAL
Qty: 90 TABLET | Refills: 3 | Status: SHIPPED | OUTPATIENT
Start: 2023-05-16

## 2023-05-16 RX ORDER — PANTOPRAZOLE SODIUM 40 MG/1
TABLET, DELAYED RELEASE ORAL
Qty: 180 TABLET | Refills: 1 | Status: SHIPPED | OUTPATIENT
Start: 2023-05-16

## 2023-05-16 RX ORDER — CARVEDILOL 25 MG/1
TABLET ORAL
Qty: 180 TABLET | Refills: 3 | Status: SHIPPED | OUTPATIENT
Start: 2023-05-16

## 2023-05-16 RX ORDER — ATORVASTATIN CALCIUM 80 MG/1
80 TABLET, FILM COATED ORAL DAILY
Qty: 45 TABLET | Refills: 1 | Status: SHIPPED | OUTPATIENT
Start: 2023-05-16

## 2023-05-16 RX ORDER — FUROSEMIDE 20 MG/1
TABLET ORAL
Qty: 90 TABLET | Refills: 3 | Status: SHIPPED | OUTPATIENT
Start: 2023-05-16

## 2023-05-16 RX ORDER — LEVOTHYROXINE SODIUM 0.03 MG/1
TABLET ORAL
Qty: 90 TABLET | Refills: 0 | Status: SHIPPED | OUTPATIENT
Start: 2023-05-16

## 2023-05-16 RX ORDER — AMLODIPINE BESYLATE 10 MG/1
TABLET ORAL
Qty: 90 TABLET | Refills: 3 | Status: SHIPPED | OUTPATIENT
Start: 2023-05-16

## 2023-05-23 DIAGNOSIS — E78.00 PURE HYPERCHOLESTEROLEMIA: ICD-10-CM

## 2023-05-23 RX ORDER — ATORVASTATIN CALCIUM 80 MG/1
80 TABLET, FILM COATED ORAL DAILY
Qty: 90 TABLET | Refills: 3 | Status: SHIPPED | OUTPATIENT
Start: 2023-05-23

## 2023-06-01 ENCOUNTER — OFFICE VISIT (OUTPATIENT)
Dept: CARDIOLOGY | Facility: CLINIC | Age: 78
End: 2023-06-01

## 2023-06-01 VITALS
OXYGEN SATURATION: 99 % | WEIGHT: 135 LBS | TEMPERATURE: 98 F | HEIGHT: 60 IN | BODY MASS INDEX: 26.5 KG/M2 | HEART RATE: 67 BPM | RESPIRATION RATE: 18 BRPM

## 2023-06-01 DIAGNOSIS — E11.65 TYPE 2 DIABETES MELLITUS WITH HYPERGLYCEMIA, WITH LONG-TERM CURRENT USE OF INSULIN: ICD-10-CM

## 2023-06-01 DIAGNOSIS — Z87.891 HISTORY OF TOBACCO ABUSE: ICD-10-CM

## 2023-06-01 DIAGNOSIS — I73.9 PAD (PERIPHERAL ARTERY DISEASE): ICD-10-CM

## 2023-06-01 DIAGNOSIS — Z79.4 TYPE 2 DIABETES MELLITUS WITH HYPERGLYCEMIA, WITH LONG-TERM CURRENT USE OF INSULIN: ICD-10-CM

## 2023-06-01 DIAGNOSIS — I10 BENIGN HYPERTENSION: Primary | ICD-10-CM

## 2023-06-01 DIAGNOSIS — I10 ESSENTIAL HYPERTENSION: ICD-10-CM

## 2023-06-01 DIAGNOSIS — E78.00 PURE HYPERCHOLESTEROLEMIA: ICD-10-CM

## 2023-06-01 DIAGNOSIS — I25.10 CORONARY ARTERY DISEASE INVOLVING NATIVE CORONARY ARTERY OF NATIVE HEART WITHOUT ANGINA PECTORIS: ICD-10-CM

## 2023-06-01 PROBLEM — Z79.899 HIGH RISK MEDICATION USE: Status: RESOLVED | Noted: 2022-04-07 | Resolved: 2023-06-01

## 2023-06-01 PROBLEM — Z86.79 HISTORY OF CHF (CONGESTIVE HEART FAILURE): Status: RESOLVED | Noted: 2022-04-07 | Resolved: 2023-06-01

## 2023-06-01 PROBLEM — J43.2 CENTRILOBULAR EMPHYSEMA: Status: ACTIVE | Noted: 2023-06-01

## 2023-06-01 PROBLEM — Z95.828 S/P FEMORAL-FEMORAL BYPASS SURGERY: Status: RESOLVED | Noted: 2022-04-07 | Resolved: 2023-06-01

## 2023-06-01 PROBLEM — I50.9 HEART FAILURE, UNSPECIFIED: Status: RESOLVED | Noted: 2021-08-15 | Resolved: 2023-06-01

## 2023-06-01 PROBLEM — R06.02 SHORTNESS OF BREATH: Status: RESOLVED | Noted: 2021-07-20 | Resolved: 2023-06-01

## 2023-06-01 RX ORDER — AMLODIPINE BESYLATE 5 MG/1
5 TABLET ORAL DAILY
Qty: 90 TABLET | Refills: 3 | Status: SHIPPED | OUTPATIENT
Start: 2023-06-01

## 2023-06-01 NOTE — PROGRESS NOTES
[facility] [place]  [facility]OLOG[facility]  [address] [place] [address]  Dept: [phone]  Dept Fax: [phone]    Vivi Son  1945    Follow Up Office Visit Note    History of Present Illness:  Vivi Son is a 78 y.o. female who presents to the clinic for Follow-up. CAD- She seems doing well, no complaints, prior stents to LAD 2015  and RCA in 2017  On ASA, EKG sinus normal EKG, unfortunately has severe left hip pain, will sent her to see the ortopedist    The following portions of the patient's history were reviewed and updated as appropriate: allergies, current medications, past family history, past medical history, past social history, past surgical history, and problem list.    Medications:  albuterol  amLODIPine  aspirin  atorvastatin  carvedilol  clopidogrel  Diclofenac Sodium gel  ezetimibe  ferrous sulfate  furosemide  gabapentin  insulin NPH-insulin regular  Insulin Syringe-Needle U-100 misc  ipratropium-albuterol  levothyroxine  methocarbamol  Nebulizer System All-In-One misc  pantoprazole  True Metrix Air Glucose Meter kit  True Metrix Blood Glucose Test strip  Vitamin C capsule  Vitamin D3 capsule    Subjective  Allergies   Allergen Reactions   • Cefuroxime GI Intolerance   • Lortab [Hydrocodone-Acetaminophen] Hallucinations   • Oxycontin [Oxycodone Hcl] Hallucinations   • Percocet [Oxycodone-Acetaminophen] Hallucinations        Past Medical History:   Diagnosis Date   • Anemia    • Anxiety    • Arthritis    • Aspiration of food     pill   • Bleeding ulcer    • Blood transfusion abn reaction or complication, no procedure mishap    • BMI 31.0-31.9,adult    • CAD (coronary artery disease)    • CHF (congestive heart failure)    • Chronic kidney disease    • COPD (chronic obstructive pulmonary disease)    • Cough    • Depression    • Diabetes mellitus type II, controlled     DX 1985, FSBS 3-4 X DAY   • Dyslipidemia    • Edema    • Fever, unspecified    • GERD  (gastroesophageal reflux disease)    • History of bronchitis    • History of pneumonia    • History of transfusion    • HTN (hypertension)    • Hyperlipidemia    • Hypothyroidism    • Long term (current) use of insulin    • Lung cancer 2016   • MI (myocardial infarction)    • Non-small cell carcinoma of right lung    • Osteoarthrosis, wrist    • PUD (peptic ulcer disease)    • PVD (peripheral vascular disease)    • Tremors of nervous system    • Wears dentures    • Wears glasses        Past Surgical History:   Procedure Laterality Date   • AORTAGRAM N/A 9/4/2018    Procedure: AORTAGRAM WITH RUNOFFS RIGHT COMMON ILLIAC AND DISTAL ABDOMINAL AORTA STENT;  Surgeon: Triston Floyd MD;  Location:  [facility];  Service: Vascular   • APPENDECTOMY  1978   • CARDIAC CATHETERIZATION     • CARPAL TUNNEL RELEASE Right    • COLONOSCOPY     • CORONARY ANGIOPLASTY WITH STENT PLACEMENT      [facility] - unknown physician name    • EAR TUBES     • EAR TUBES Left    • ENDOSCOPY     • FEMORAL ENDARTERECTOMY Right 9/21/2018    Procedure: FEMORAL ENDARTERECTOMY;  Surgeon: Triston Floyd MD;  Location:  [facility];  Service: Vascular   • FEMORAL FEMORAL BYPASS Right 9/21/2018    Procedure: FEMORAL FEMORAL BYPASS RIGHT;  Surgeon: Triston Floyd MD;  Location:  [facility];  Service: Vascular   • LUMBAR SPINE SURGERY     • LUNG LOBECTOMY Right     RUL    • SACROCOCCYGEAL ULCER REMOVAL     • VOCAL CORD BIOPSY         Family History   Problem Relation Age of Onset   • Diabetes insipidus Mother    • Diabetes Mother    • Kidney failure Mother    • Dementia Father    • Coronary artery disease Other         LESS THAN 60 YEARS OF AGE   • Coronary artery disease Brother         Social History     Socioeconomic History   • Marital status:    • Number of children: 2   Tobacco Use   • Smoking status: Every Day     Packs/day: 1.50     Years: 60.00     Pack years: 90.00     Types: Cigarettes   • Smokeless tobacco: Never  "  Vaping Use   • Vaping Use: Never used   Substance and Sexual Activity   • Alcohol use: No   • Drug use: No   • Sexual activity: Defer       Review of Systems    Cardiovascular Procedures    ECHO/MUGA:  STRESS TESTS:   CARDIAC CATH:   DEVICES:   HOLTER:   CT/MRI:   VASCULAR:   CARDIOTHORACIC:     Objective  Vitals:    06/01/23 1319   Pulse: 67   Resp: 18   Temp: 98 °F (36.7 °C)   TempSrc: Infrared   SpO2: 99%   Weight: 61.2 kg (135 lb)   Height: 152.4 cm (60\")   PainSc: 0-No pain     Body mass index is 26.37 kg/m².     Physical Exam  Physical Exam     Diagnostic Data    ECG 12 Lead    Date/Time: 6/1/2023 7:40 PM  Performed by: Nimesh Lopez MD  Authorized by: Nimesh Lopez MD   Comparison: compared with previous ECG from 6/1/2022  Similar to previous ECG  Rhythm: sinus rhythm  Rate: normal  BPM: 67  QRS axis: normal    Clinical impression: normal ECG            Assessment and Plan  Diagnoses and all orders for this visit:    Benign hypertension- BP is  130.70, on Amlodipine 5 mg,Coreg 25 bid Lasix 20 mg     Coronary artery disease involving native coronary artery of native heart without angina pectoris    History of tobacco abuse- Still smoking advised to quit      Pure hypercholesterolemia- On Lipitor 40 mg and Zetia LDl is great 42. Tolerates well the meds    PAD (S/P Right Fem-Fem Bypass)- No claudication on ASA    Type 2 diabetes mellitus with hyperglycemia, with long-term current use of insulin             Return in about 6 months (around 12/1/2023) for Recheck with Dr. Lopez.    Nimesh Lopez MD  06/01/2023  "

## 2023-06-13 ENCOUNTER — OFFICE VISIT (OUTPATIENT)
Dept: FAMILY MEDICINE CLINIC | Facility: CLINIC | Age: 78
End: 2023-06-13
Payer: MEDICARE

## 2023-06-13 VITALS
HEIGHT: 60 IN | WEIGHT: 131.4 LBS | DIASTOLIC BLOOD PRESSURE: 78 MMHG | TEMPERATURE: 97.3 F | BODY MASS INDEX: 25.8 KG/M2 | HEART RATE: 83 BPM | SYSTOLIC BLOOD PRESSURE: 138 MMHG | OXYGEN SATURATION: 94 %

## 2023-06-13 DIAGNOSIS — F33.1 MAJOR DEPRESSIVE DISORDER, RECURRENT, MODERATE: ICD-10-CM

## 2023-06-13 DIAGNOSIS — R60.0 BILATERAL LOWER EXTREMITY EDEMA: ICD-10-CM

## 2023-06-13 DIAGNOSIS — Z79.4 TYPE 2 DIABETES MELLITUS WITH HYPERGLYCEMIA, WITH LONG-TERM CURRENT USE OF INSULIN: ICD-10-CM

## 2023-06-13 DIAGNOSIS — Z00.00 ENCOUNTER FOR WELLNESS EXAMINATION IN ADULT: Primary | ICD-10-CM

## 2023-06-13 DIAGNOSIS — E55.9 VITAMIN D DEFICIENCY: ICD-10-CM

## 2023-06-13 DIAGNOSIS — J43.2 CENTRILOBULAR EMPHYSEMA: ICD-10-CM

## 2023-06-13 DIAGNOSIS — E11.65 TYPE 2 DIABETES MELLITUS WITH HYPERGLYCEMIA, WITH LONG-TERM CURRENT USE OF INSULIN: ICD-10-CM

## 2023-06-13 DIAGNOSIS — D61.09 OTHER CONSTITUTIONAL APLASTIC ANEMIA: ICD-10-CM

## 2023-06-13 DIAGNOSIS — C34.91 NON-SMALL CELL CARCINOMA OF RIGHT LUNG: ICD-10-CM

## 2023-06-13 PROBLEM — N18.4 HYPERTENSIVE HEART AND KIDNEY DISEASE WITH HF AND WITH CKD STAGE IV: Status: RESOLVED | Noted: 2022-05-06 | Resolved: 2023-06-13

## 2023-06-13 PROBLEM — I13.0 HYPERTENSIVE HEART AND KIDNEY DISEASE WITH HF AND WITH CKD STAGE IV: Status: RESOLVED | Noted: 2022-05-06 | Resolved: 2023-06-13

## 2023-06-13 LAB
EXPIRATION DATE: ABNORMAL
Lab: ABNORMAL
POC MICROALBUMIN URINE: 20

## 2023-06-13 RX ORDER — SODIUM BICARBONATE 650 MG/1
650 TABLET ORAL 3 TIMES DAILY
COMMUNITY
Start: 2023-05-16

## 2023-06-13 NOTE — ASSESSMENT & PLAN NOTE
COPD is  stable, patient continues to smoke.  Strongly recommended smoking cessation .  Discussed monitoring symptoms and use of quick-relief medications and contacting us early in the course of exacerbations.  Warning signs of respiratory distress were reviewed with the patient.

## 2023-06-13 NOTE — PROGRESS NOTES
The ABCs of the Annual Wellness Visit  Subsequent Medicare Wellness Visit    Subjective    Vivi Son is a 78 y.o. female who presents for a Subsequent Medicare Wellness Visit.  Chronic medical conditions include hypertension, hyperlipidemia, CAD, PAD, PVD, hypertensive CHF, hypothyroidism, type 2 diabetes, vitamin D deficiency, GERD, history of non-small cell carcinoma of the right lung, anemia, polyneuropathy, emphysema, osteoarthritis, and continued tobacco abuse.  She declines further health maintenance such as mammogram, DEXA, or colon screening.    The following portions of the patient's history were reviewed and   updated as appropriate: allergies, current medications, past family history, past medical history, past social history, past surgical history, and problem list.    Compared to one year ago, the patient feels her physical   health is worse.    Compared to one year ago, the patient feels her mental   health is the same.    Recent Hospitalizations:  She was not admitted to the hospital during the last year.       Current Medical Providers:  Patient Care Team:  Claire Abad DO as PCP - General (Family Medicine)  Nimesh Lopez MD as Consulting Physician (Cardiology)    Outpatient Medications Prior to Visit   Medication Sig Dispense Refill    albuterol (PROVENTIL) (2.5 MG/3ML) 0.083% nebulizer solution Take 2.5 mg by nebulization Every 4 (Four) Hours As Needed for Wheezing.      amLODIPine (NORVASC) 5 MG tablet Take 1 tablet by mouth Daily. 90 tablet 3    Ascorbic Acid (Vitamin C) 500 MG capsule Take 1 capsule by mouth Daily.      aspirin 81 MG EC tablet Take 1 tablet by mouth Daily.      atorvastatin (LIPITOR) 80 MG tablet Take 1 tablet by mouth Daily. 90 tablet 3    Blood Glucose Monitoring Suppl (True Metrix Air Glucose Meter) w/Device kit USE AS DIRECTED 1 kit 0    carvedilol (COREG) 25 MG tablet TAKE 1 TABLET TWICE DAILY WITH MEALS (NEED MD APPOINTMENT) 180 tablet 3     "Cholecalciferol (VITAMIN D3) 1000 units capsule Take 1 capsule by mouth Daily.      clopidogrel (PLAVIX) 75 MG tablet Take 1 tablet by mouth Daily. 90 tablet 3    ezetimibe (ZETIA) 10 MG tablet TAKE 1 TABLET EVERY DAY 90 tablet 3    Ferrous Sulfate (IRON) 325 (65 Fe) MG tablet Take 1 tablet by mouth 2 (Two) Times a Day.      furosemide (LASIX) 20 MG tablet TAKE 1 TABLET EVERY DAY 90 tablet 3    gabapentin (NEURONTIN) 300 MG capsule Take 1 capsule by mouth 3 (Three) Times a Day. 90 capsule 5    insulin NPH-insulin regular (humuLIN 70/30,novoLIN 70/30) (70-30) 100 UNIT/ML injection Inject 30 Units under the skin into the appropriate area as directed 2 (Two) Times a Day With Meals. 30 units in AM and 24 units in PM      Insulin Syringe-Needle U-100 31G X 1/4\" 1 ML misc 1 each 2 (Two) Times a Day. 100 each 5    ipratropium-albuterol (DUO-NEB) 0.5-2.5 mg/3 ml nebulizer Take 3 mL by nebulization Every 4 (Four) Hours As Needed for Wheezing.      levothyroxine (SYNTHROID, LEVOTHROID) 25 MCG tablet TAKE 1 TABLET EVERY DAY 90 tablet 0    methocarbamol (Robaxin) 500 MG tablet 1/2-1 tab PO TID PRN muscle cramps 90 tablet 5    Nebulizer System All-In-One misc 1 each Daily. 1 each 0    pantoprazole (PROTONIX) 40 MG EC tablet TAKE 1 TABLET TWICE DAILY 180 tablet 1    sodium bicarbonate 650 MG tablet Take 1 tablet by mouth 3 (Three) Times a Day.      True Metrix Blood Glucose Test test strip TEST BLOOD SUGAR EVERY  each 12    Diclofenac Sodium (VOLTAREN) 1 % gel gel Apply 4 g topically to the appropriate area as directed 4 (Four) Times a Day As Needed (joint pain). 100 g 5     No facility-administered medications prior to visit.       No opioid medication identified on active medication list. I have reviewed chart for other potential  high risk medication/s and harmful drug interactions in the elderly.        Aspirin is on active medication list. Aspirin use is indicated based on review of current medical condition/s. Pros " and cons of this therapy have been discussed today. Benefits of this medication outweigh potential harm.  Patient has been encouraged to continue taking this medication.  .      Patient Active Problem List   Diagnosis    PAD (S/P Right Fem-Fem Bypass)    Hyperlipidemia    HTN (hypertension)    Chronic kidney disease    CAD (coronary artery disease)    GERD (gastroesophageal reflux disease)    Hypothyroidism    Tobacco abuse    Acute frontal sinusitis    At high risk for falls    Cough    Edema    History of anemia    History of anxiety    Class 1 obesity due to excess calories with serious comorbidity and body mass index (BMI) of 30.0 to 30.9 in adult    Osteoarthritis of wrist    Polyneuropathy due to type 2 diabetes mellitus    Pure hypercholesterolemia    Stage 3b chronic kidney disease    Type 2 diabetes mellitus with hyperglycemia, with long-term current use of insulin    Gastroesophageal reflux disease without esophagitis    Acquired hypothyroidism    Benign hypertension    Peripheral vascular disease    History of tobacco abuse    Peripheral polyneuropathy    Acute kidney failure, unspecified    Personal history of nicotine dependence    Pneumonia, unspecified organism    Sepsis, unspecified organism    Type 2 diabetes mellitus without complications    Hypothyroidism, unspecified    Encounter for wellness examination in adult    Malignant neoplasm of unspecified part of unspecified bronchus or lung    Overweight    Vitamin D deficiency    Encounter for screening mammogram for malignant neoplasm of breast    Osteopenia    Non-small cell carcinoma of right lung    Menopausal syndrome    Acute bilateral low back pain without sciatica    Muscle cramps at night    Dysuria    Acute cystitis with hematuria    Acute left lumbar radiculopathy    Centrilobular emphysema    Major depressive disorder, recurrent, moderate    Other constitutional aplastic anemia    Bilateral lower extremity edema     Advance Care Planning  "  Advance Care Planning     Advance Directive is not on file.  ACP discussion was held with the patient during this visit. Patient does not have an advance directive, information provided.     Objective    Vitals:    23 1327 23 1419   BP: 142/80 138/78   BP Location: Left arm    Patient Position: Sitting    Cuff Size: Adult    Pulse: 83    Temp: 97.3 °F (36.3 °C)    TempSrc: Temporal    SpO2: 94%    Weight: 59.6 kg (131 lb 6.4 oz)    Height: 152.4 cm (60\")      Estimated body mass index is 25.66 kg/m² as calculated from the following:    Height as of this encounter: 152.4 cm (60\").    Weight as of this encounter: 59.6 kg (131 lb 6.4 oz).    BMI is >= 25 and <30. (Overweight) The following options were offered after discussion;: nutrition counseling/recommendations      Does the patient have evidence of cognitive impairment? No          HEALTH RISK ASSESSMENT    Smoking Status:  Social History     Tobacco Use   Smoking Status Every Day    Packs/day: 1.50    Years: 60.00    Pack years: 90.00    Types: Cigarettes   Smokeless Tobacco Never     Alcohol Consumption:  Social History     Substance and Sexual Activity   Alcohol Use No     Fall Risk Screen:    STEADI Fall Risk Assessment was completed, and patient is at LOW risk for falls.Assessment completed on:2023    Depression Screenin/13/2023     1:23 PM   PHQ-2/PHQ-9 Depression Screening   Little Interest or Pleasure in Doing Things 0-->not at all   Feeling Down, Depressed or Hopeless 0-->not at all   PHQ-9: Brief Depression Severity Measure Score 0       Health Habits and Functional and Cognitive Screenin/13/2023     1:24 PM   Functional & Cognitive Status   Do you have difficulty preparing food and eating? Yes   Do you have difficulty bathing yourself, getting dressed or grooming yourself? No   Do you have difficulty using the toilet? No   Do you have difficulty moving around from place to place? No   Do you have trouble with steps " or getting out of a bed or a chair? Yes   Current Diet Limited Junk Food   Dental Exam Up to date   Eye Exam Not up to date   Exercise (times per week) 0 times per week   Current Exercises Include No Regular Exercise   Do you need help using the phone?  No   Are you deaf or do you have serious difficulty hearing?  No   Do you need help with transportation? No   Do you need help shopping? No   Do you need help preparing meals?  No   Do you need help with housework?  No   Do you need help with laundry? No   Do you need help taking your medications? No   Do you need help managing money? No   Do you ever drive or ride in a car without wearing a seat belt? No   Have you felt unusual stress, anger or loneliness in the last month? No   Who do you live with? Alone   If you need help, do you have trouble finding someone available to you? No   Have you been bothered in the last four weeks by sexual problems? No   Do you have difficulty concentrating, remembering or making decisions? Yes       Age-appropriate Screening Schedule:  Refer to the list below for future screening recommendations based on patient's age, sex and/or medical conditions. Orders for these recommended tests are listed in the plan section. The patient has been provided with a written plan.    Health Maintenance   Topic Date Due    TDAP/TD VACCINES (1 - Tdap) Never done    DIABETIC EYE EXAM  Never done    URINE MICROALBUMIN  05/06/2023    HEMOGLOBIN A1C  06/08/2023    ZOSTER VACCINE (1 of 2) 06/13/2023 (Originally 5/25/1996)    COVID-19 Vaccine (3 - Pfizer series) 06/15/2023 (Originally 4/25/2022)    HEPATITIS C SCREENING  06/13/2024 (Originally 8/23/2018)    Pneumococcal Vaccine 65+ (1 - PCV) 06/13/2024 (Originally 5/25/1951)    INFLUENZA VACCINE  10/01/2023    LIPID PANEL  12/08/2023    MAMMOGRAM  06/08/2024    DXA SCAN  06/08/2024    ANNUAL WELLNESS VISIT  06/13/2024    COLORECTAL CANCER SCREENING  11/15/2031    LUNG CANCER SCREENING  Discontinued  "                 CMS Preventative Services Quick Reference  Risk Factors Identified During Encounter  Fall Risk-High or Moderate: Discussed Fall Prevention in the home  Inactivity/Sedentary: Patient was advised to exercise at least 150 minutes a week per CDC recommendations.  Polypharmacy: Medication List reviewed  The above risks/problems have been discussed with the patient.  Pertinent information has been shared with the patient in the After Visit Summary.  An After Visit Summary and PPPS were made available to the patient.    Follow Up:   Next Medicare Wellness visit to be scheduled in 1 year.       Additional E&M Note during same encounter follows:  Patient has multiple medical problems which are significant and separately identifiable that require additional work above and beyond the Medicare Wellness Visit.      Chief Complaint  Medicare Wellness-subsequent and Edema (Bilateral ankle and foot swelling )    Subjective        HPI  Vivi Son is also being seen today for lower extremity edema.  This has been intermittent over the past couple of years but when the weather gets warm she notices the swelling is more prominent.  She is currently on furosemide.  She admits that she sits or lays most of the day and has limited physical activity.  She does not have any compression stockings.    Review of Systems   Constitutional:  Positive for fatigue. Negative for chills and fever.   Respiratory:  Positive for wheezing. Negative for cough and shortness of breath.    Cardiovascular:  Positive for leg swelling. Negative for chest pain and palpitations.   Gastrointestinal:  Negative for abdominal pain, constipation, diarrhea, nausea and vomiting.   Skin:  Negative for rash.   Neurological:  Positive for tremors and weakness. Negative for dizziness.   Psychiatric/Behavioral:  The patient is not nervous/anxious.      Objective   Vital Signs:  /78   Pulse 83   Temp 97.3 °F (36.3 °C) (Temporal)   Ht 152.4 cm (60\") "   Wt 59.6 kg (131 lb 6.4 oz)   SpO2 94%   BMI 25.66 kg/m²     Physical Exam  Vitals and nursing note reviewed.   Constitutional:       Comments: Very chronically ill-appearing, appears older than stated age   Cardiovascular:      Rate and Rhythm: Normal rate and regular rhythm.      Heart sounds: Normal heart sounds. No murmur heard.  Pulmonary:      Effort: Pulmonary effort is normal.      Breath sounds: Examination of the right-lower field reveals wheezing. Examination of the left-lower field reveals wheezing. Wheezing present.   Musculoskeletal:      Right lower le+ Pitting Edema present.      Left lower le+ Pitting Edema present.   Skin:     General: Skin is warm.   Neurological:      Mental Status: She is alert and oriented to person, place, and time. Mental status is at baseline.      Motor: Tremor present.   Psychiatric:         Mood and Affect: Mood normal.         Behavior: Behavior normal.                       Assessment and Plan   Diagnoses and all orders for this visit:    1. Encounter for wellness examination in adult (Primary)  Assessment & Plan:  Labs today, she declines further health maintenance    Orders:  -     Hemoglobin A1c; Future  -     CBC Auto Differential; Future  -     Comprehensive Metabolic Panel; Future  -     Lipid Panel; Future  -     TSH; Future  -     T4, Free; Future  -     Vitamin B12; Future  -     Hemoglobin A1c  -     CBC Auto Differential  -     Comprehensive Metabolic Panel  -     Lipid Panel  -     TSH  -     T4, Free  -     Vitamin B12    2. Type 2 diabetes mellitus with hyperglycemia, with long-term current use of insulin  Assessment & Plan:  Diabetes is unchanged.   Continue current treatment regimen.  Dietary recommendations for ADA diet.  Regular aerobic exercise.  Discussed ways to avoid symptomatic hypoglycemia.  Diabetes will be reassessed in 6 months.    Orders:  -     POCT microalbumin    3. Non-small cell carcinoma of right lung  Assessment &  Plan:  Patient likely has recurrence but unfortunately is not a candidate for any surgical intervention.  She continues to follow with hematology/oncology and general surgery.      4. Centrilobular emphysema  Assessment & Plan:  COPD is  stable, patient continues to smoke.  Strongly recommended smoking cessation .  Discussed monitoring symptoms and use of quick-relief medications and contacting us early in the course of exacerbations.  Warning signs of respiratory distress were reviewed with the patient.           5. Major depressive disorder, recurrent, moderate  Assessment & Plan:  Patient's depression is recurrent and is moderate without psychosis. Their depression is currently active and the condition is  stable . This will be reassessed at the next regular appointment. F/U as described:patient will continue current medication therapy.      6. Bilateral lower extremity edema  Assessment & Plan:  Likely multifactorial, I have encouraged her to obtain compression stockings and wear daily but take off at night.  I also instructed her that she may take 1 additional furosemide tablet for 3 days to help improve worsening edema      7. Vitamin D deficiency  -     Vitamin D,25-Hydroxy; Future  -     Vitamin D,25-Hydroxy    8. Other constitutional aplastic anemia  Assessment & Plan:  Stable, followed by hematology        Discussed injury prevention, diet and exercise, safe sexual practices, and screening for common diseases. Encouraged use of sunscreen and seatbelts. Encouraged SBE, avoidance of tobacco, limiting alcohol, and yearly dental and eye exams.          Follow Up   Return in about 6 months (around 12/13/2023) for Recheck with labs.  Patient was given instructions and counseling regarding her condition or for health maintenance advice. Please see specific information pulled into the AVS if appropriate.

## 2023-06-13 NOTE — ASSESSMENT & PLAN NOTE
Patient likely has recurrence but unfortunately is not a candidate for any surgical intervention.  She continues to follow with hematology/oncology and general surgery.

## 2023-06-13 NOTE — ASSESSMENT & PLAN NOTE
Diabetes is unchanged.   Continue current treatment regimen.  Dietary recommendations for ADA diet.  Regular aerobic exercise.  Discussed ways to avoid symptomatic hypoglycemia.  Diabetes will be reassessed in 6 months.

## 2023-06-13 NOTE — ASSESSMENT & PLAN NOTE
Patient's depression is recurrent and is moderate without psychosis. Their depression is currently active and the condition is  stable . This will be reassessed at the next regular appointment. F/U as described:patient will continue current medication therapy.

## 2023-06-13 NOTE — ASSESSMENT & PLAN NOTE
Likely multifactorial, I have encouraged her to obtain compression stockings and wear daily but take off at night.  I also instructed her that she may take 1 additional furosemide tablet for 3 days to help improve worsening edema

## 2023-06-14 LAB
25(OH)D3+25(OH)D2 SERPL-MCNC: 32.4 NG/ML (ref 30–100)
ALBUMIN SERPL-MCNC: 3.4 G/DL (ref 3.7–4.7)
ALBUMIN/GLOB SERPL: 1.4 {RATIO} (ref 1.2–2.2)
ALP SERPL-CCNC: 91 IU/L (ref 44–121)
ALT SERPL-CCNC: 16 IU/L (ref 0–32)
AST SERPL-CCNC: 14 IU/L (ref 0–40)
BASOPHILS # BLD AUTO: 0.1 X10E3/UL (ref 0–0.2)
BASOPHILS NFR BLD AUTO: 1 %
BILIRUB SERPL-MCNC: <0.2 MG/DL (ref 0–1.2)
BUN SERPL-MCNC: 30 MG/DL (ref 8–27)
BUN/CREAT SERPL: 14 (ref 12–28)
CALCIUM SERPL-MCNC: 8.6 MG/DL (ref 8.7–10.3)
CHLORIDE SERPL-SCNC: 107 MMOL/L (ref 96–106)
CHOLEST SERPL-MCNC: 156 MG/DL (ref 100–199)
CO2 SERPL-SCNC: 17 MMOL/L (ref 20–29)
CREAT SERPL-MCNC: 2.16 MG/DL (ref 0.57–1)
EGFRCR SERPLBLD CKD-EPI 2021: 23 ML/MIN/1.73
EOSINOPHIL # BLD AUTO: 0.2 X10E3/UL (ref 0–0.4)
EOSINOPHIL NFR BLD AUTO: 2 %
ERYTHROCYTE [DISTWIDTH] IN BLOOD BY AUTOMATED COUNT: 13.1 % (ref 11.7–15.4)
GLOBULIN SER CALC-MCNC: 2.5 G/DL (ref 1.5–4.5)
GLUCOSE SERPL-MCNC: 117 MG/DL (ref 70–99)
HBA1C MFR BLD: 6.1 % (ref 4.8–5.6)
HCT VFR BLD AUTO: 38.3 % (ref 34–46.6)
HDLC SERPL-MCNC: 64 MG/DL
HGB BLD-MCNC: 12.6 G/DL (ref 11.1–15.9)
IMM GRANULOCYTES # BLD AUTO: 0.1 X10E3/UL (ref 0–0.1)
IMM GRANULOCYTES NFR BLD AUTO: 1 %
LDLC SERPL CALC-MCNC: 73 MG/DL (ref 0–99)
LYMPHOCYTES # BLD AUTO: 1.7 X10E3/UL (ref 0.7–3.1)
LYMPHOCYTES NFR BLD AUTO: 15 %
MCH RBC QN AUTO: 33.9 PG (ref 26.6–33)
MCHC RBC AUTO-ENTMCNC: 32.9 G/DL (ref 31.5–35.7)
MCV RBC AUTO: 103 FL (ref 79–97)
MONOCYTES # BLD AUTO: 0.7 X10E3/UL (ref 0.1–0.9)
MONOCYTES NFR BLD AUTO: 6 %
NEUTROPHILS # BLD AUTO: 8.3 X10E3/UL (ref 1.4–7)
NEUTROPHILS NFR BLD AUTO: 75 %
PLATELET # BLD AUTO: 265 X10E3/UL (ref 150–450)
POTASSIUM SERPL-SCNC: 5.5 MMOL/L (ref 3.5–5.2)
PROT SERPL-MCNC: 5.9 G/DL (ref 6–8.5)
RBC # BLD AUTO: 3.72 X10E6/UL (ref 3.77–5.28)
SODIUM SERPL-SCNC: 142 MMOL/L (ref 134–144)
T4 FREE SERPL-MCNC: 1.29 NG/DL (ref 0.82–1.77)
TRIGL SERPL-MCNC: 105 MG/DL (ref 0–149)
TSH SERPL DL<=0.005 MIU/L-ACNC: 1.92 UIU/ML (ref 0.45–4.5)
VIT B12 SERPL-MCNC: 1577 PG/ML (ref 232–1245)
VLDLC SERPL CALC-MCNC: 19 MG/DL (ref 5–40)
WBC # BLD AUTO: 11 X10E3/UL (ref 3.4–10.8)

## 2023-07-19 ENCOUNTER — TELEPHONE (OUTPATIENT)
Dept: FAMILY MEDICINE CLINIC | Facility: CLINIC | Age: 78
End: 2023-07-19
Payer: MEDICARE

## 2023-07-27 ENCOUNTER — TRANSCRIBE ORDERS (OUTPATIENT)
Dept: CT IMAGING | Facility: CLINIC | Age: 78
End: 2023-07-27
Payer: MEDICARE

## 2023-07-27 DIAGNOSIS — C34.90 NON-SMALL CELL LUNG CANCER, UNSPECIFIED LATERALITY: Primary | ICD-10-CM

## 2023-07-31 RX ORDER — CALCIUM CITRATE/VITAMIN D3 200MG-6.25
TABLET ORAL
Qty: 100 EACH | Refills: 1 | Status: SHIPPED | OUTPATIENT
Start: 2023-07-31

## 2023-08-09 DIAGNOSIS — E11.42 POLYNEUROPATHY DUE TO TYPE 2 DIABETES MELLITUS: ICD-10-CM

## 2023-08-09 NOTE — TELEPHONE ENCOUNTER
Caller: Son Vivi CHAUNCEY    Relationship: Self    Best call back number: [phone]     Requested Prescriptions:   Requested Prescriptions     Pending Prescriptions Disp Refills    gabapentin (NEURONTIN) 300 MG capsule 90 capsule 2     Sig: Take 1 capsule by mouth 3 (Three) Times a Day.        Pharmacy where request should be sent: [facility] - [facility] [address] [facility] - [phone] [facility] [phone] FX  [facility] [address] [place] [address] [address] [facility] - [phone] [facility] [phone] [facility] [address] [facility] - [phone] [facility] [phone] FX     Last office visit with prescribing clinician: 6/13/2023   Last telemedicine visit with prescribing clinician: Visit date not found   Next office visit with prescribing clinician: 12/13/2023     Additional details provided by patient: PATIENT STATES SHE ASKED FOR THIS REFILL EARLY LAST WEEK. [facility] HAS NOT RECEIVED THIS YET. STATES SHE ALWAYS GETS 6 MONTHS REFILLS AND SHOULD NOT HAVE TO REQUEST EVERY MONTH AND HAS HAD TO ASK FOR REFILLS THE LAST 2 MONTHS DUE TO CONTROLLED SUBSTANCE AND PHARMACY STATES THEY WON'T SEND THE REFILLS. PATIENT IS CONFUSED AS TO WHY THE PHARMACY WILL NOT SEND THE MEDICATION WITHOUT CONTACTING THE PCP OFFICE.    Does the patient have less than a 3 day supply:  [x] Yes  [] No COMPLETELY OUT OF MEDICATION. NO WAY TO  TODAY. NEEDS A PARTIAL PRESCRIPTION SENT TO [facility] TO HOLD HER OVER UNTIL [facility] CAN GET THE PRESCRIPTION AND MAIL REFILLS.    Would you like a call back once the refill request has been completed: [] Yes [x] No    If the office needs to give you a call back, can they leave a voicemail: [] Yes [x] No CAN ONLY GET TEXT MESSAGES. NO VOICEMAIL SET UP    Shayna Otto Rep   08/09/23 13:58 EDT

## 2023-08-10 RX ORDER — DIPHENHYDRAMINE HCL 25 MG
TABLET ORAL
Qty: 1 KIT | Refills: 0 | Status: SHIPPED | OUTPATIENT
Start: 2023-08-10

## 2023-08-10 RX ORDER — SYRINGE AND NEEDLE,INSULIN,1ML 31GX15/64"
SYRINGE, EMPTY DISPOSABLE MISCELLANEOUS
Qty: 200 EACH | Refills: 5 | Status: SHIPPED | OUTPATIENT
Start: 2023-08-10 | End: 2023-08-14 | Stop reason: SDUPTHER

## 2023-08-11 RX ORDER — GABAPENTIN 300 MG/1
300 CAPSULE ORAL 3 TIMES DAILY
Qty: 90 CAPSULE | Refills: 2 | Status: SHIPPED | OUTPATIENT
Start: 2023-08-11

## 2023-08-14 ENCOUNTER — TELEPHONE (OUTPATIENT)
Dept: FAMILY MEDICINE CLINIC | Facility: CLINIC | Age: 78
End: 2023-08-14
Payer: MEDICARE

## 2023-08-14 RX ORDER — SYRINGE AND NEEDLE,INSULIN,1ML 31GX15/64"
1 SYRINGE, EMPTY DISPOSABLE MISCELLANEOUS 2 TIMES DAILY
Qty: 200 EACH | Refills: 5 | Status: SHIPPED | OUTPATIENT
Start: 2023-08-14

## 2023-08-14 NOTE — TELEPHONE ENCOUNTER
Caller: [facility] - [facility] [address] - [phone] [facility] [phone] FX    Relationship: Pharmacy    Best call back number:  [phone]    Requested Prescriptions:   Requested Prescriptions      No prescriptions requested or ordered in this encounter    PEN NEEDLES 3/16     Pharmacy where request should be sent:  [facility]     Last office visit with prescribing clinician: 6/13/2023   Last telemedicine visit with prescribing clinician: Visit date not found   Next office visit with prescribing clinician: 12/13/2023     Additional details provided by patient:       Would you like a call back once the refill request has been completed: [] Yes [x] No    If the office needs to give you a call back, can they leave a voicemail: [] Yes [x] No

## 2023-09-29 DIAGNOSIS — I25.10 CORONARY ARTERY DISEASE INVOLVING NATIVE CORONARY ARTERY OF NATIVE HEART WITHOUT ANGINA PECTORIS: ICD-10-CM

## 2023-09-29 RX ORDER — CLOPIDOGREL BISULFATE 75 MG/1
TABLET ORAL
Qty: 90 TABLET | Refills: 3 | Status: SHIPPED | OUTPATIENT
Start: 2023-09-29

## 2023-12-26 RX ORDER — LEVOTHYROXINE SODIUM 0.03 MG/1
TABLET ORAL
Qty: 90 TABLET | Refills: 3 | Status: SHIPPED | OUTPATIENT
Start: 2023-12-26

## 2024-02-27 ENCOUNTER — OUTSIDE FACILITY SERVICE (OUTPATIENT)
Dept: CARDIOLOGY | Facility: CLINIC | Age: 79
End: 2024-02-27
Payer: MEDICARE

## 2024-02-27 PROCEDURE — 93306 TTE W/DOPPLER COMPLETE: CPT | Performed by: INTERNAL MEDICINE

## (undated) DEVICE — SUT SILK 3/0 TIES 18IN A184H

## (undated) DEVICE — SOL LR 1000ML

## (undated) DEVICE — STERILE LATEX POWDER FREE SURGICAL GLOVES WITH HYDROGEL COATING: Brand: PROTEXIS

## (undated) DEVICE — SUCTION CANISTER, 2500CC, RIGID: Brand: DEROYAL

## (undated) DEVICE — SUT SILK 0/0 CT2 18IN C027D

## (undated) DEVICE — GLIDEX™ COATED HYDROPHILIC GUIDEWIRE: Brand: MAGIC TORQUE™

## (undated) DEVICE — 3M(TM) TEGADERM(TM) IV TRANSPARENT FILM DRESSING WITH BORDER 1650: Brand: 3M™ TEGADERM™

## (undated) DEVICE — PK VASC 10

## (undated) DEVICE — 3M™ IOBAN™ 2 ANTIMICROBIAL INCISE DRAPE 6651EZ: Brand: IOBAN™ 2

## (undated) DEVICE — SI AVANTI+ 7F STD W/GW  NO OBT: Brand: AVANTI

## (undated) DEVICE — SKIN AFFIX SURG ADHESIVE 72/CS 0.55ML: Brand: MEDLINE

## (undated) DEVICE — ANTIBACTERIAL UNDYED BRAIDED (POLYGLACTIN 910), SYNTHETIC ABSORBABLE SUTURE: Brand: COATED VICRYL

## (undated) DEVICE — DRSNG SURG AQUACEL AG 9X10CM

## (undated) DEVICE — SUT PROLN 7/0 BV1 D/A 24IN 8702H

## (undated) DEVICE — PTA BALLOON DILATATION CATHETER: Brand: MUSTANG™

## (undated) DEVICE — COVER,LIGHT HANDLE,FLX,1/PK: Brand: MEDLINE INDUSTRIES, INC.

## (undated) DEVICE — SEALANT HEMO TACHOSIL FIBRIN PTCH 9.5X4.8CM

## (undated) DEVICE — CVR HNDL LT SURG ACCSSRY BLU STRL

## (undated) DEVICE — CANN NASL CO2 DIVIDED A/

## (undated) DEVICE — AVANTI + 4F STD W/GW: Brand: AVANTI

## (undated) DEVICE — PK ANGIO OR 10

## (undated) DEVICE — SUT SILK 2/0 TIES 18IN A185H

## (undated) DEVICE — DECANT BG O JET

## (undated) DEVICE — TBG INJ CONTRL EXCITE RA 1200PSI 48IN

## (undated) DEVICE — SUT PROLN 6/0 C1 D/A 30IN 8706H

## (undated) DEVICE — SI AVANTI+ 6F STD W/GW  NO OBT: Brand: AVANTI

## (undated) DEVICE — SUT PROLN SURGILENE 5.0 24IN BLU 9702H

## (undated) DEVICE — ELECTRD BLD 1P STD SS 3/32X2.44IN

## (undated) DEVICE — NDL HYPO ECLPS SFTY 22G 1 1/2IN

## (undated) DEVICE — CATH GUIDE SOFTVU FLUSH HT PIG .035 4F 65CM

## (undated) DEVICE — INFLATION DEVICE: Brand: ENCORE™ 26